# Patient Record
Sex: FEMALE | Race: WHITE | NOT HISPANIC OR LATINO | Employment: OTHER | URBAN - METROPOLITAN AREA
[De-identification: names, ages, dates, MRNs, and addresses within clinical notes are randomized per-mention and may not be internally consistent; named-entity substitution may affect disease eponyms.]

---

## 2017-01-23 ENCOUNTER — TELEPHONE (OUTPATIENT)
Dept: CARDIOLOGY | Facility: CLINIC | Age: 77
End: 2017-01-23

## 2017-01-23 NOTE — TELEPHONE ENCOUNTER
Pt's Benicar has become to expensive and would like an alternative.  Pt has discovered that the generic may be cheaper and will be calling the insurance company to see how much it would cost before requesting the alternative.

## 2017-06-19 RX ORDER — OLMESARTAN MEDOXOMIL 20 MG/1
TABLET ORAL
Qty: 45 TABLET | Refills: 0 | Status: SHIPPED | OUTPATIENT
Start: 2017-06-19 | End: 2017-08-02

## 2017-06-19 RX ORDER — AMILORIDE HYDROCHLORIDE AND HYDROCHLOROTHIAZIDE 5; 50 MG/1; MG/1
TABLET ORAL
Qty: 90 TABLET | Refills: 0 | Status: SHIPPED | OUTPATIENT
Start: 2017-06-19 | End: 2017-08-10 | Stop reason: SDUPTHER

## 2017-08-02 ENCOUNTER — OFFICE VISIT (OUTPATIENT)
Dept: CARDIOLOGY | Facility: CLINIC | Age: 77
End: 2017-08-02

## 2017-08-02 VITALS
HEART RATE: 58 BPM | DIASTOLIC BLOOD PRESSURE: 82 MMHG | WEIGHT: 183 LBS | HEIGHT: 64 IN | BODY MASS INDEX: 31.24 KG/M2 | SYSTOLIC BLOOD PRESSURE: 136 MMHG

## 2017-08-02 DIAGNOSIS — I10 ESSENTIAL HYPERTENSION: ICD-10-CM

## 2017-08-02 DIAGNOSIS — R94.31 ABNORMAL EKG: ICD-10-CM

## 2017-08-02 DIAGNOSIS — I25.10 CORONARY ARTERY DISEASE INVOLVING NATIVE CORONARY ARTERY OF NATIVE HEART WITHOUT ANGINA PECTORIS: Primary | ICD-10-CM

## 2017-08-02 PROCEDURE — 93000 ELECTROCARDIOGRAM COMPLETE: CPT | Performed by: INTERNAL MEDICINE

## 2017-08-02 PROCEDURE — 99213 OFFICE O/P EST LOW 20 MIN: CPT | Performed by: INTERNAL MEDICINE

## 2017-08-02 RX ORDER — LOSARTAN POTASSIUM 50 MG/1
50 TABLET ORAL DAILY
Qty: 90 TABLET | Refills: 1 | Status: SHIPPED | OUTPATIENT
Start: 2017-08-02 | End: 2017-10-11 | Stop reason: SDUPTHER

## 2017-08-02 NOTE — PROGRESS NOTES
Date of Office Visit: 2017  Encounter Provider: Vincenzo Hudson MD  Place of Service: [facility] CARDIOLOGY  Patient Name: Diana Bianchi  :1940    Chief Complaint   Patient presents with   • Coronary Artery Disease     ANNUAL FOLLOW UP    :     HPI: Diana Bianchi is a 76 y.o. female who presents today to followup.      She suffered a myocardial infarction in  while living in [place] and was treated with TPA. She moved to [place] in  and had a cardiac catheterization in . She did not have any type of intervention at that time, and has been treated with medical therapy.  She also has a longstanding hypertension. She doesn't check her blood pressure at home.     She is doing very well.  Her blood pressure is well controlled.  She denies chest pain, dyspnea, orthopnea, edema, palpitations, or syncope.  Her insurance is not covering olmesartan any more.      Past Medical History:   Diagnosis Date   • ASCVD (arteriosclerotic cardiovascular disease)     MI , treated in [place] with tPA.  Cath in  in [place], report unavailable, but no stenting was done.   • Claustrophobia    • Concussion    • GERD (gastroesophageal reflux disease)    • Hypertension    • MICHAELLE (obstructive sleep apnea)        Past Surgical History:   Procedure Laterality Date   • FOOT SURGERY     • HYSTERECTOMY     • KNEE SURGERY     • SPINE SURGERY         Social History     Social History   • Marital status: Single     Spouse name: N/A   • Number of children: N/A   • Years of education: N/A     Occupational History   • Not on file.     Social History Main Topics   • Smoking status: Never Smoker   • Smokeless tobacco: Never Used   • Alcohol use No      Comment: caffeine use/ 3 CUPS DAILY    • Drug use: No   • Sexual activity: Not on file     Other Topics Concern   • Not on file     Social History Narrative       Family History   Problem Relation Age of Onset   • Heart disease Mother    •  "Heart disease Father    • Bone cancer Sister    • Heart disease Brother    • Stroke Paternal Grandfather    • Hypertension Other        Review of Systems   Unable to perform ROS  Cardiovascular: Positive for dyspnea on exertion.   All other systems reviewed and are negative.      Allergies   Allergen Reactions   • Peanut Oil Shortness Of Breath   • Codeine    • Morphine And Related    • Statins          Current Outpatient Prescriptions:   •  aMILoride-hydrochlorothiazide (MODURETIC) 5-50 MG per tablet, Take 1 tablet by mouth  daily, Disp: 90 tablet, Rfl: 0  •  aspirin 81 MG tablet, Take  by mouth daily., Disp: , Rfl:   •  Cholecalciferol (VITAMIN D PO), Take  by mouth daily., Disp: , Rfl:   •  Coenzyme Q10 (CO Q 10 PO), Take  by mouth daily., Disp: , Rfl:   •  fluticasone (FLONASE) 50 MCG/ACT nasal spray, 2 sprays into each nostril daily. Administer 2 sprays in each nostril for each dose., Disp: , Rfl:   •  folic acid (FOLVITE) 400 MCG tablet, Take  by mouth daily., Disp: , Rfl:   •  lansoprazole (PREVACID) 30 MG capsule, Take  by mouth daily., Disp: , Rfl:   •  Omega 3 1000 MG capsule, Take  by mouth daily., Disp: , Rfl:   •  losartan (COZAAR) 50 MG tablet, Take 1 tablet by mouth Daily., Disp: 90 tablet, Rfl: 1     Objective:     Vitals:    08/02/17 1038   BP: 136/82   Pulse: 58   Weight: 183 lb (83 kg)   Height: 64\" (162.6 cm)     Body mass index is 31.41 kg/(m^2).    Physical Exam   Constitutional: She is oriented to person, place, and time. She appears well-developed and well-nourished.   HENT:   Head: Normocephalic.   Nose: Nose normal.   Mouth/Throat: Oropharynx is clear and moist.   Eyes: Conjunctivae and EOM are normal. Pupils are equal, round, and reactive to light.   Neck: Normal range of motion. No JVD present.   Cardiovascular: Normal rate, regular rhythm, normal heart sounds and intact distal pulses.    No murmur heard.  Pulmonary/Chest: Effort normal and breath sounds normal.   Abdominal: Soft. She " exhibits no mass. There is no tenderness.   Musculoskeletal: Normal range of motion. She exhibits no edema.   Lymphadenopathy:     She has no cervical adenopathy.   Neurological: She is alert and oriented to person, place, and time. No cranial nerve deficit.   Skin: Skin is warm and dry. No rash noted.   Psychiatric: She has a normal mood and affect. Her behavior is normal. Judgment and thought content normal.   Vitals reviewed.        ECG 12 Lead  Date/Time: 8/2/2017 3:28 PM  Performed by: VINCENZO HUDSON  Authorized by: VINCENZO HUDSON   Comparison: compared with previous ECG   Similar to previous ECG  Rhythm: sinus rhythm  Conduction: conduction normal  ST Flattening: V2 and V3  T depression: I and aVL  QRS axis: normal  Other findings: LAE  Clinical impression: abnormal ECG              Assessment:       Diagnosis Plan   1. Coronary artery disease involving native coronary artery of native heart without angina pectoris  Stress Test With Myocardial Perfusion One Day   2. Abnormal EKG  Stress Test With Myocardial Perfusion One Day   3. Essential hypertension            Plan:       1/2.  She has known CAD and no angina, but her EKG has lateral TWI that are new.  I am checking a nuclear stress.  She is on aspirin but is intolerant of statins.    3.  Her BP is within goal.  I have changed her from olmesartan to losartan due to cost.  She states she has mild hyponatremia and wonders if the diuretic needs to be stopped.  As long as her serum sodium is > 130, I feel it's reasonable to stay on it.  She developed terrible hypertension in the past when it was stopped.    Sincerely,       Vincenzo Hudson MD

## 2017-08-09 ENCOUNTER — HOSPITAL ENCOUNTER (OUTPATIENT)
Dept: CARDIOLOGY | Facility: HOSPITAL | Age: 77
Discharge: HOME | End: 2017-08-09
Attending: INTERNAL MEDICINE | Admitting: INTERNAL MEDICINE

## 2017-08-09 DIAGNOSIS — R94.31 ABNORMAL EKG: ICD-10-CM

## 2017-08-09 DIAGNOSIS — I25.10 CORONARY ARTERY DISEASE INVOLVING NATIVE CORONARY ARTERY OF NATIVE HEART WITHOUT ANGINA PECTORIS: ICD-10-CM

## 2017-08-09 LAB
BH CV NUCLEAR PRIOR STUDY: 3
BH CV STRESS BP STAGE 1: NORMAL
BH CV STRESS BP STAGE 2: NORMAL
BH CV STRESS DURATION MIN STAGE 1: 3
BH CV STRESS DURATION MIN STAGE 2: 3
BH CV STRESS DURATION SEC STAGE 1: 0
BH CV STRESS DURATION SEC STAGE 2: 30
BH CV STRESS GRADE STAGE 1: 10
BH CV STRESS GRADE STAGE 2: 12
BH CV STRESS HR STAGE 1: 103
BH CV STRESS HR STAGE 2: 126
BH CV STRESS METS STAGE 1: 5
BH CV STRESS METS STAGE 2: 7.5
BH CV STRESS PROTOCOL 1: NORMAL
BH CV STRESS RECOVERY BP: NORMAL MMHG
BH CV STRESS RECOVERY HR: 67 BPM
BH CV STRESS SPEED STAGE 1: 1.7
BH CV STRESS SPEED STAGE 2: 2.5
BH CV STRESS STAGE 1: 1
BH CV STRESS STAGE 2: 2
LV EF NUC BP: 52 %
MAXIMAL PREDICTED HEART RATE: 146 BPM
PERCENT MAX PREDICTED HR: 86.3 %
STRESS BASELINE BP: NORMAL MMHG
STRESS BASELINE HR: 60 BPM
STRESS PERCENT HR: 102 %
STRESS POST ESTIMATED WORKLOAD: 7.5 METS
STRESS POST EXERCISE DUR MIN: 6 MIN
STRESS POST EXERCISE DUR SEC: 30 SEC
STRESS POST PEAK BP: NORMAL MMHG
STRESS POST PEAK HR: 126 BPM
STRESS TARGET HR: 124 BPM

## 2017-08-09 PROCEDURE — 0 TECHNETIUM TETROFOSMIN KIT: Performed by: INTERNAL MEDICINE

## 2017-08-09 PROCEDURE — 78452 HT MUSCLE IMAGE SPECT MULT: CPT | Performed by: INTERNAL MEDICINE

## 2017-08-09 PROCEDURE — 93016 CV STRESS TEST SUPVJ ONLY: CPT | Performed by: INTERNAL MEDICINE

## 2017-08-09 PROCEDURE — 93017 CV STRESS TEST TRACING ONLY: CPT

## 2017-08-09 PROCEDURE — 78452 HT MUSCLE IMAGE SPECT MULT: CPT

## 2017-08-09 PROCEDURE — 93018 CV STRESS TEST I&R ONLY: CPT | Performed by: INTERNAL MEDICINE

## 2017-08-09 PROCEDURE — A9502 TC99M TETROFOSMIN: HCPCS | Performed by: INTERNAL MEDICINE

## 2017-08-09 RX ADMIN — TETROFOSMIN 1 DOSE: 1.38 INJECTION, POWDER, LYOPHILIZED, FOR SOLUTION INTRAVENOUS at 10:00

## 2017-08-09 RX ADMIN — TETROFOSMIN 1 DOSE: 1.38 INJECTION, POWDER, LYOPHILIZED, FOR SOLUTION INTRAVENOUS at 09:00

## 2017-08-10 ENCOUNTER — TELEPHONE (OUTPATIENT)
Dept: CARDIOLOGY | Facility: CLINIC | Age: 77
End: 2017-08-10

## 2017-08-10 RX ORDER — AMILORIDE HYDROCHLORIDE AND HYDROCHLOROTHIAZIDE 5; 50 MG/1; MG/1
1 TABLET ORAL DAILY
Qty: 90 TABLET | Refills: 3 | Status: SHIPPED | OUTPATIENT
Start: 2017-08-10 | End: 2018-03-14 | Stop reason: SDUPTHER

## 2017-08-10 NOTE — TELEPHONE ENCOUNTER
Pt called and requested a refill on her Amiloride/ hctz., submitted rx.  Pt also states that she received you v/m, but still has some questions about the test.  I attempted to contact pt back, but had to leave a v/m.  I informed her that she should receive a call back on Fri regarding results/ questions.

## 2017-08-29 ENCOUNTER — TELEPHONE (OUTPATIENT)
Dept: CARDIOLOGY | Facility: CLINIC | Age: 77
End: 2017-08-29

## 2017-08-29 NOTE — TELEPHONE ENCOUNTER
Pt left a v/m reporting that since starting the Losartan, she experiences dizziness and upset stomach.  Denies soa or any other symptoms.  I called pt back, but had to leave a v/m; instructed pt to call the office back to discuss.

## 2017-10-11 RX ORDER — LOSARTAN POTASSIUM 50 MG/1
50 TABLET ORAL DAILY
Qty: 90 TABLET | Refills: 2 | Status: SHIPPED | OUTPATIENT
Start: 2017-10-11 | End: 2018-07-05 | Stop reason: SDUPTHER

## 2017-10-30 ENCOUNTER — OFFICE VISIT (OUTPATIENT)
Dept: RETAIL CLINIC | Facility: CLINIC | Age: 77
End: 2017-10-30

## 2017-10-30 DIAGNOSIS — Z23 NEEDS FLU SHOT: Primary | ICD-10-CM

## 2018-03-15 RX ORDER — AMILORIDE HYDROCHLORIDE AND HYDROCHLOROTHIAZIDE 5; 50 MG/1; MG/1
1 TABLET ORAL DAILY
Qty: 90 TABLET | Refills: 1 | Status: SHIPPED | OUTPATIENT
Start: 2018-03-15 | End: 2018-07-05 | Stop reason: SDUPTHER

## 2018-04-12 ENCOUNTER — TELEPHONE (OUTPATIENT)
Dept: CARDIOLOGY | Facility: CLINIC | Age: 78
End: 2018-04-12

## 2018-04-12 NOTE — TELEPHONE ENCOUNTER
[phone]    Pt states she purchased Vitamin K supplement to take.  She is wondering if it is ok to take while on her asa.  Please advise.      [phone]    Thanks, C GABBY

## 2018-07-03 NOTE — PROGRESS NOTES
Date of Office Visit: 2018  Encounter Provider: Vincenzo Hudson MD  Place of Service: [facility] CARDIOLOGY  Patient Name: Diana Bianchi  :1940    Chief Complaint   Patient presents with   • Coronary Artery Disease   :     HPI: Diana Bianchi is a 77 y.o. female who presents today to followup.      She suffered a myocardial infarction in  while living in [place] and was treated with TPA. She moved to [place] in  and had a cardiac catheterization in . She did not have any type of intervention at that time, and has been treated with medical therapy.  She also has a longstanding hypertension.     In 2017, I noted some lateral T wave inversions; I ordered a Myoview stress which was normal.     She is doing very well.  She denies chest pain, dyspnea, orthopnea, edema, palpitations, or syncope.      Past Medical History:   Diagnosis Date   • ASCVD (arteriosclerotic cardiovascular disease)     MI , treated in [place] with tPA.  Cath in  in [place], report unavailable, but no stenting was done.   • Claustrophobia    • Concussion    • GERD (gastroesophageal reflux disease)    • Hypertension    • MICHAELLE (obstructive sleep apnea)        Past Surgical History:   Procedure Laterality Date   • FOOT SURGERY     • HYSTERECTOMY     • KNEE SURGERY     • SPINE SURGERY         Social History     Social History   • Marital status: Single     Spouse name: N/A   • Number of children: N/A   • Years of education: N/A     Occupational History   • Not on file.     Social History Main Topics   • Smoking status: Never Smoker   • Smokeless tobacco: Never Used      Comment: caffeine use/ 3 CUPS DAILY    • Alcohol use No   • Drug use: No   • Sexual activity: Not on file     Other Topics Concern   • Not on file     Social History Narrative   • No narrative on file       Family History   Problem Relation Age of Onset   • Heart disease Mother    • Heart disease Father    • Bone cancer  "Sister    • Heart disease Brother    • Stroke Paternal Grandfather    • Hypertension Other        Review of Systems   Unable to perform ROS  Constitution: Positive for malaise/fatigue.   Cardiovascular: Negative for chest pain and palpitations.   Respiratory: Negative for shortness of breath.    Musculoskeletal: Positive for myalgias.   Neurological: Negative for light-headedness.   All other systems reviewed and are negative.      Allergies   Allergen Reactions   • Peanut Oil Shortness Of Breath   • Codeine    • Morphine And Related    • Statins          Current Outpatient Prescriptions:   •  aMILoride-hydrochlorothiazide (MODURETIC) 5-50 MG per tablet, Take 1 tablet by mouth Daily., Disp: 90 tablet, Rfl: 3  •  aspirin 81 MG tablet, Take  by mouth daily., Disp: , Rfl:   •  Cholecalciferol (VITAMIN D PO), Take  by mouth daily., Disp: , Rfl:   •  Coenzyme Q10 (CO Q 10 PO), Take  by mouth daily., Disp: , Rfl:   •  fluticasone (FLONASE) 50 MCG/ACT nasal spray, 2 sprays into each nostril daily. Administer 2 sprays in each nostril for each dose., Disp: , Rfl:   •  folic acid (FOLVITE) 400 MCG tablet, Take  by mouth daily., Disp: , Rfl:   •  HAVRIX 1440 EL U/ML vaccine, ADM 1ML IM UTD, Disp: , Rfl: 0  •  losartan (COZAAR) 50 MG tablet, Take 1 tablet by mouth Daily., Disp: 90 tablet, Rfl: 3  •  Omega 3 1000 MG capsule, Take  by mouth daily., Disp: , Rfl:   •  raNITIdine (ZANTAC) 300 MG tablet, Take 300 mg by mouth Every Night., Disp: , Rfl:      Objective:     Vitals:    07/05/18 1339 07/05/18 1406   BP: 138/82 132/80   BP Location: Right arm    Pulse: 53    Weight: 83.2 kg (183 lb 6.4 oz)    Height: 162.6 cm (64\")      Body mass index is 31.48 kg/m².    Physical Exam   Constitutional: She is oriented to person, place, and time. She appears well-developed and well-nourished.   HENT:   Head: Normocephalic.   Nose: Nose normal.   Mouth/Throat: Oropharynx is clear and moist.   Eyes: Conjunctivae and EOM are normal. Pupils are " equal, round, and reactive to light.   Neck: Normal range of motion. No JVD present.   Cardiovascular: Normal rate, regular rhythm, normal heart sounds and intact distal pulses.    No murmur heard.  Pulmonary/Chest: Effort normal and breath sounds normal.   Abdominal: Soft. She exhibits no mass. There is no tenderness.   Musculoskeletal: Normal range of motion. She exhibits no edema.   Lymphadenopathy:     She has no cervical adenopathy.   Neurological: She is alert and oriented to person, place, and time. No cranial nerve deficit.   Skin: Skin is warm and dry. No rash noted.   Psychiatric: She has a normal mood and affect. Her behavior is normal. Judgment and thought content normal.   Vitals reviewed.        ECG 12 Lead  Date/Time: 7/5/2018 2:01 PM  Performed by: VINCENZO HUDSON  Authorized by: VINCENZO HUDSON   Comparison: compared with previous ECG   Comparison to previous ECG: C/w prior, TW abnormality has improved  Rhythm: sinus rhythm  Conduction: conduction normal  ST Segments: ST segments normal  T Waves: T waves normal  QRS axis: normal  Other findings: PRWP  Clinical impression: non-specific ECG              Assessment:       Diagnosis Plan   1. Coronary artery disease involving native coronary artery of native heart without angina pectoris     2. Essential hypertension            Plan:     1.  Coronary Artery Disease  Assessment  • The patient has no angina  • She is intolerant of statins.  A Myoview was normal in 2017.    Subjective - Objective  • There is a history of past MI 1991  • Current antiplatelet therapy includes aspirin 81 mg      2.  Her BP is generally within goal.      Sincerely,       Vincenzo Hudson MD

## 2018-07-05 ENCOUNTER — OFFICE VISIT (OUTPATIENT)
Dept: CARDIOLOGY | Facility: CLINIC | Age: 78
End: 2018-07-05

## 2018-07-05 ENCOUNTER — TRANSCRIBE ORDERS (OUTPATIENT)
Dept: CARDIOLOGY | Facility: CLINIC | Age: 78
End: 2018-07-05

## 2018-07-05 VITALS
HEIGHT: 64 IN | SYSTOLIC BLOOD PRESSURE: 132 MMHG | WEIGHT: 183.4 LBS | HEART RATE: 53 BPM | DIASTOLIC BLOOD PRESSURE: 80 MMHG | BODY MASS INDEX: 31.31 KG/M2

## 2018-07-05 DIAGNOSIS — Z00.00 ROUTINE CHECK-UP: Primary | ICD-10-CM

## 2018-07-05 DIAGNOSIS — I25.10 CORONARY ARTERY DISEASE INVOLVING NATIVE CORONARY ARTERY OF NATIVE HEART WITHOUT ANGINA PECTORIS: Primary | ICD-10-CM

## 2018-07-05 DIAGNOSIS — I10 ESSENTIAL HYPERTENSION: ICD-10-CM

## 2018-07-05 PROCEDURE — 93000 ELECTROCARDIOGRAM COMPLETE: CPT | Performed by: INTERNAL MEDICINE

## 2018-07-05 PROCEDURE — 99213 OFFICE O/P EST LOW 20 MIN: CPT | Performed by: INTERNAL MEDICINE

## 2018-07-05 RX ORDER — AMILORIDE HYDROCHLORIDE AND HYDROCHLOROTHIAZIDE 5; 50 MG/1; MG/1
1 TABLET ORAL DAILY
Qty: 90 TABLET | Refills: 3 | Status: SHIPPED | OUTPATIENT
Start: 2018-07-05 | End: 2019-05-23 | Stop reason: SDUPTHER

## 2018-07-05 RX ORDER — HEPATITIS A VACCINE 1440 [IU]/ML
INJECTION, SUSPENSION INTRAMUSCULAR
Refills: 0 | COMMUNITY
Start: 2018-05-15 | End: 2019-08-20

## 2018-07-05 RX ORDER — LOSARTAN POTASSIUM 50 MG/1
50 TABLET ORAL DAILY
Qty: 90 TABLET | Refills: 3 | Status: SHIPPED | OUTPATIENT
Start: 2018-07-05 | End: 2019-07-31 | Stop reason: SDUPTHER

## 2018-07-23 RX ORDER — LOSARTAN POTASSIUM 50 MG/1
50 TABLET ORAL DAILY
Qty: 90 TABLET | Refills: 3 | Status: SHIPPED | OUTPATIENT
Start: 2018-07-23 | End: 2019-07-31 | Stop reason: SDUPTHER

## 2018-07-25 ENCOUNTER — TELEPHONE (OUTPATIENT)
Dept: CARDIOLOGY | Facility: CLINIC | Age: 78
End: 2018-07-25

## 2018-08-06 ENCOUNTER — TELEPHONE (OUTPATIENT)
Dept: CARDIOLOGY | Facility: CLINIC | Age: 78
End: 2018-08-06

## 2018-08-06 NOTE — TELEPHONE ENCOUNTER
I have low suspicion.  I would ask her to restart the medication and call with any more episodes of sweating.  Thank you

## 2018-08-06 NOTE — TELEPHONE ENCOUNTER
Pt called stating that she missed two days worth of her Losartan due to just rushing around and forgot.   She did resume taking today.   She noticed today while at the grocery that she broke out in a profuse sweat that started running down her neck. No other symptoms.   She would like to know if this could be due to missing 2 doses of her Losartan.

## 2018-08-25 ENCOUNTER — OFFICE VISIT (OUTPATIENT)
Dept: RETAIL CLINIC | Facility: CLINIC | Age: 78
End: 2018-08-25

## 2018-08-25 VITALS
HEIGHT: 64 IN | DIASTOLIC BLOOD PRESSURE: 86 MMHG | OXYGEN SATURATION: 97 % | SYSTOLIC BLOOD PRESSURE: 136 MMHG | BODY MASS INDEX: 31.58 KG/M2 | WEIGHT: 185 LBS | RESPIRATION RATE: 18 BRPM | HEART RATE: 58 BPM | TEMPERATURE: 97.4 F

## 2018-08-25 DIAGNOSIS — J01.10 ACUTE NON-RECURRENT FRONTAL SINUSITIS: Primary | ICD-10-CM

## 2018-08-25 PROCEDURE — 99213 OFFICE O/P EST LOW 20 MIN: CPT | Performed by: NURSE PRACTITIONER

## 2018-08-25 RX ORDER — AMOXICILLIN 875 MG/1
875 TABLET, COATED ORAL 2 TIMES DAILY
Qty: 20 TABLET | Refills: 0 | Status: SHIPPED | OUTPATIENT
Start: 2018-08-25 | End: 2018-09-04

## 2018-08-25 NOTE — PROGRESS NOTES
"Subjective   Diana Bianchi is a 77 y.o. female.     /86 (BP Location: Left arm, Patient Position: Sitting, Cuff Size: Adult)   Pulse 58   Temp 97.4 °F (36.3 °C) (Oral)   Resp 18   Ht 162.6 cm (64\")   Wt 83.9 kg (185 lb)   SpO2 97%   BMI 31.76 kg/m²     Sore Throat    This is a new problem. The current episode started in the past 7 days. The problem has been gradually worsening. The pain is worse on the right side. There has been no fever. The pain is at a severity of 4/10. The pain is mild. Associated symptoms include ear pain and headaches. Pertinent negatives include no abdominal pain, congestion, coughing, ear discharge, neck pain, shortness of breath, swollen glands or vomiting. She has had no exposure to strep or mono. Treatments tried: allergy meds  The treatment provided no relief.        The following portions of the patient's history were reviewed and updated as appropriate: current medications, past family history, past medical history, past social history, past surgical history and problem list.    Review of Systems   Constitutional: Positive for fatigue. Negative for chills and fever.   HENT: Positive for ear pain and sore throat. Negative for congestion and ear discharge.    Eyes: Negative.    Respiratory: Negative.  Negative for cough and shortness of breath.    Cardiovascular: Negative.    Gastrointestinal: Negative.  Negative for abdominal pain and vomiting.   Endocrine: Negative.    Genitourinary: Negative.    Musculoskeletal: Negative.  Negative for neck pain.   Skin: Negative.    Allergic/Immunologic: Positive for environmental allergies.   Neurological: Positive for headache.   Hematological: Negative.    Psychiatric/Behavioral: Negative.        Objective   Physical Exam   Constitutional: She is oriented to person, place, and time. She appears well-developed and well-nourished.   HENT:   Head: Normocephalic and atraumatic.   Right Ear: Hearing, tympanic membrane, external ear and ear " canal normal.   Left Ear: Hearing, tympanic membrane, external ear and ear canal normal.   Nose: Sinus tenderness and congestion present. Right sinus exhibits frontal sinus tenderness. Left sinus exhibits frontal sinus tenderness.   Mouth/Throat: Uvula is midline, oropharynx is clear and moist and mucous membranes are normal.   Eyes: Pupils are equal, round, and reactive to light. Conjunctivae and EOM are normal.   Wears glasses    Neck: Normal range of motion.   Cardiovascular: Normal rate, regular rhythm and normal heart sounds.    Pulmonary/Chest: Effort normal and breath sounds normal.   Abdominal: Soft. Bowel sounds are normal.   Musculoskeletal: Normal range of motion.   Neurological: She is alert and oriented to person, place, and time.   Skin: Skin is warm and dry.   Psychiatric: She has a normal mood and affect.         Assessment/Plan   Diana was seen today for sore throat.    Diagnoses and all orders for this visit:    Acute non-recurrent frontal sinusitis  -     amoxicillin (AMOXIL) 875 MG tablet; Take 1 tablet by mouth 2 (Two) Times a Day for 10 days.        Sinus rinse

## 2018-08-25 NOTE — PATIENT INSTRUCTIONS
Sinusitis, Adult  Sinusitis is soreness and inflammation of your sinuses. Sinuses are hollow spaces in the bones around your face. They are located:  · Around your eyes.  · In the middle of your forehead.  · Behind your nose.  · In your cheekbones.    Your sinuses and nasal passages are lined with a stringy fluid (mucus). Mucus normally drains out of your sinuses. When your nasal tissues get inflamed or swollen, the mucus can get trapped or blocked so air cannot flow through your sinuses. This lets bacteria, viruses, and funguses grow, and that leads to infection.  Follow these instructions at home:  Medicines  · Take, use, or apply over-the-counter and prescription medicines only as told by your doctor. These may include nasal sprays.  · If you were prescribed an antibiotic medicine, take it as told by your doctor. Do not stop taking the antibiotic even if you start to feel better.  Hydrate and Humidify  · Drink enough water to keep your pee (urine) clear or pale yellow.  · Use a cool mist humidifier to keep the humidity level in your home above 50%.  · Breathe in steam for 10-15 minutes, 3-4 times a day or as told by your doctor. You can do this in the bathroom while a hot shower is running.  · Try not to spend time in cool or dry air.  Rest  · Rest as much as possible.  · Sleep with your head raised (elevated).  · Make sure to get enough sleep each night.  General instructions  · Put a warm, moist washcloth on your face 3-4 times a day or as told by your doctor. This will help with discomfort.  · Wash your hands often with soap and water. If there is no soap and water, use hand .  · Do not smoke. Avoid being around people who are smoking (secondhand smoke).  · Keep all follow-up visits as told by your doctor. This is important.  Contact a doctor if:  · You have a fever.  · Your symptoms get worse.  · Your symptoms do not get better within 10 days.  Get help right away if:  · You have a very bad  headache.  · You cannot stop throwing up (vomiting).  · You have pain or swelling around your face or eyes.  · You have trouble seeing.  · You feel confused.  · Your neck is stiff.  · You have trouble breathing.  This information is not intended to replace advice given to you by your health care provider. Make sure you discuss any questions you have with your health care provider.  Document Released: 06/05/2009 Document Revised: 08/13/2017 Document Reviewed: 10/12/2016  Elsevier Interactive Patient Education © 2018 [employer]

## 2018-10-14 ENCOUNTER — HOSPITAL ENCOUNTER (OUTPATIENT)
Dept: CARDIOLOGY | Facility: HOSPITAL | Age: 78
Discharge: HOME | End: 2018-10-14
Attending: INTERNAL MEDICINE

## 2018-10-14 DIAGNOSIS — Z00.00 ROUTINE CHECK-UP: ICD-10-CM

## 2018-10-17 LAB
BH CV XLRA MEAS - PAD LEFT ABI PT: 0.94
BH CV XLRA MEAS - PAD LEFT ARM: 145 MMHG
BH CV XLRA MEAS - PAD LEFT LEG PT: 145 MMHG
BH CV XLRA MEAS - PAD RIGHT ABI PT: 1
BH CV XLRA MEAS - PAD RIGHT ARM: 154 MMHG
BH CV XLRA MEAS - PAD RIGHT LEG PT: 157 MMHG
BH CV XLRA MEAS - PROX AO DIAM: 1.7 CM
BH CV XLRA MEAS LEFT ICA/CCA RATIO: 1.6
BH CV XLRA MEAS LEFT MID CCA PSV: NORMAL CM/SEC
BH CV XLRA MEAS LEFT MID ICA PSV: NORMAL CM/SEC
BH CV XLRA MEAS LEFT PROX ECA PSV: 63 CM/SEC
BH CV XLRA MEAS RIGHT ICA/CCA RATIO: 1.7
BH CV XLRA MEAS RIGHT MID CCA PSV: NORMAL CM/SEC
BH CV XLRA MEAS RIGHT MID ICA PSV: NORMAL CM/SEC
BH CV XLRA MEAS RIGHT PROX ECA PSV: 50 CM/SEC

## 2018-10-18 DIAGNOSIS — I73.9 CLAUDICATION (HCC): Primary | ICD-10-CM

## 2018-10-29 ENCOUNTER — HOSPITAL ENCOUNTER (OUTPATIENT)
Dept: CARDIOLOGY | Facility: HOSPITAL | Age: 78
Discharge: HOME | End: 2018-10-29
Attending: INTERNAL MEDICINE | Admitting: INTERNAL MEDICINE

## 2018-10-29 ENCOUNTER — TELEPHONE (OUTPATIENT)
Dept: CARDIOLOGY | Facility: CLINIC | Age: 78
End: 2018-10-29

## 2018-10-29 DIAGNOSIS — I73.9 CLAUDICATION (HCC): ICD-10-CM

## 2018-10-29 LAB
BH CV LOWER ARTERIAL LEFT ABI RATIO: 1.13
BH CV LOWER ARTERIAL LEFT DORSALIS PEDIS SYS MAX: 195 MMHG
BH CV LOWER ARTERIAL LEFT GREAT TOE SYS MAX: 107 MMHG
BH CV LOWER ARTERIAL LEFT POST TIBIAL SYS MAX: 179 MMHG
BH CV LOWER ARTERIAL LEFT TBI RATIO: 0.62
BH CV LOWER ARTERIAL RIGHT DORSALIS PEDIS SYS MAX: 188 MMHG
BH CV LOWER ARTERIAL RIGHT GREAT TOE SYS MAX: 133 MMHG
BH CV LOWER ARTERIAL RIGHT TBI RATIO: 0.77
UPPER ARTERIAL LEFT ARM BRACHIAL SYS MAX: 171 MMHG
UPPER ARTERIAL RIGHT ARM BRACHIAL SYS MAX: 173 MMHG

## 2018-10-29 PROCEDURE — 93922 UPR/L XTREMITY ART 2 LEVELS: CPT

## 2018-10-29 NOTE — TELEPHONE ENCOUNTER
Pt of Dr. Hudson:  Please review FLORENCE results.  I can contact pt if needed.     Doppler Ankle Brachial Index Single Level CAR   Order: [id number]   Status:  Final result   Visible to patient:  No (Not Released) Dx:  Claudication (CMS/[facility])   Details     Reading Physician Reading Date Result Priority   Dayana Reardon Jr., MD 10/29/2018 Routine   Result Text   ·   Right Conclusion: The right FLORENCE is normal. Normal digital pressures.  · Left Conclusion: The left FLORENCE is normal. Normal digital pressures.            All Measurements                                                                   [facility] NON-INV VASC  [address] [facility] [address] [phone]      Study Findings     Right Sided Findings:  • Right Posterior Tibial: The pulse is detected w/ Doppler. The right posterior tibial artery is calcified and not compressible.  • Right Dorsalis Pedis: The pulse is 2+.   Right lower assessment findings include thickened toenails.     Left Sided Findings:  • Left Posterior Tibial: The pulse is detected w/ Doppler.   • Left Dorsalis Pedis: The pulse is 2+.   Left lower assessment findings include thickened toenails.   Study Impression     Right Conclusion: The right FLORENCE is normal. Normal digital pressures.     Left Conclusion: The left FLORENCE is normal. Normal digital pressures.

## 2019-02-18 ENCOUNTER — TELEPHONE (OUTPATIENT)
Dept: CARDIOLOGY | Facility: CLINIC | Age: 79
End: 2019-02-18

## 2019-05-24 RX ORDER — AMILORIDE HYDROCHLORIDE AND HYDROCHLOROTHIAZIDE 5; 50 MG/1; MG/1
1 TABLET ORAL DAILY
Qty: 90 TABLET | Refills: 0 | Status: SHIPPED | OUTPATIENT
Start: 2019-05-24 | End: 2019-07-31 | Stop reason: SDUPTHER

## 2019-05-24 RX ORDER — LOSARTAN POTASSIUM 50 MG/1
TABLET ORAL
Qty: 90 TABLET | Refills: 0 | Status: SHIPPED | OUTPATIENT
Start: 2019-05-24 | End: 2019-07-31 | Stop reason: SDUPTHER

## 2019-07-31 ENCOUNTER — OFFICE VISIT (OUTPATIENT)
Dept: CARDIOLOGY | Facility: CLINIC | Age: 79
End: 2019-07-31

## 2019-07-31 VITALS
DIASTOLIC BLOOD PRESSURE: 80 MMHG | WEIGHT: 178 LBS | SYSTOLIC BLOOD PRESSURE: 146 MMHG | BODY MASS INDEX: 30.39 KG/M2 | HEART RATE: 49 BPM | HEIGHT: 64 IN

## 2019-07-31 DIAGNOSIS — I25.10 CORONARY ARTERY DISEASE INVOLVING NATIVE CORONARY ARTERY OF NATIVE HEART WITHOUT ANGINA PECTORIS: Primary | ICD-10-CM

## 2019-07-31 DIAGNOSIS — R00.1 SINUS BRADYCARDIA: ICD-10-CM

## 2019-07-31 DIAGNOSIS — I10 ESSENTIAL HYPERTENSION: ICD-10-CM

## 2019-07-31 PROCEDURE — 93000 ELECTROCARDIOGRAM COMPLETE: CPT | Performed by: INTERNAL MEDICINE

## 2019-07-31 PROCEDURE — 99214 OFFICE O/P EST MOD 30 MIN: CPT | Performed by: INTERNAL MEDICINE

## 2019-07-31 RX ORDER — AMILORIDE HYDROCHLORIDE AND HYDROCHLOROTHIAZIDE 5; 50 MG/1; MG/1
1 TABLET ORAL DAILY
Qty: 90 TABLET | Refills: 3 | Status: SHIPPED | OUTPATIENT
Start: 2019-07-31 | End: 2020-07-21

## 2019-07-31 RX ORDER — LOSARTAN POTASSIUM 50 MG/1
50 TABLET ORAL DAILY
Qty: 90 TABLET | Refills: 3 | Status: SHIPPED | OUTPATIENT
Start: 2019-07-31 | End: 2019-08-31 | Stop reason: SDUPTHER

## 2019-07-31 NOTE — PROGRESS NOTES
Date of Office Visit: 2019  Encounter Provider: Vincenzo Hudson MD  Place of Service: [facility]  Patient Name: Diana Bianchi  :1940    Chief Complaint   Patient presents with   • Coronary Artery Disease   :     HPI: Diana Bianchi is a 78 y.o. female who presents today to followup.      She suffered a myocardial infarction in  while living in [place] and was treated with TPA. She moved to [place] in  and had a cardiac catheterization in . She did not have any type of intervention at that time, and has been treated with medical therapy.  She also has a longstanding hypertension.     In 2017, I noted some lateral T wave inversions; I ordered a Myoview stress which was normal.     She has generally done very well She denies chest pain, dyspnea, orthopnea, edema, palpitations, or syncope.  She has been very tired the last three days though.    Past Medical History:   Diagnosis Date   • ASCVD (arteriosclerotic cardiovascular disease)     MI , treated in [place] with tPA.  Cath in  in [place], report unavailable, but no stenting was done.   • Claustrophobia    • Concussion    • GERD (gastroesophageal reflux disease)    • Hypertension    • MICHAELLE (obstructive sleep apnea)        Past Surgical History:   Procedure Laterality Date   • FOOT SURGERY     • HYSTERECTOMY     • KNEE SURGERY     • SPINE SURGERY         Social History     Socioeconomic History   • Marital status: Single     Spouse name: Not on file   • Number of children: Not on file   • Years of education: Not on file   • Highest education level: Not on file   Tobacco Use   • Smoking status: Never Smoker   • Smokeless tobacco: Never Used   • Tobacco comment: caffeine use/ 3 CUPS DAILY    Substance and Sexual Activity   • Alcohol use: No   • Drug use: No       Family History   Problem Relation Age of Onset   • Heart disease Mother    • Heart disease Father    • Bone cancer Sister    • Heart  "disease Brother    • Stroke Paternal Grandfather    • Hypertension Other        Review of Systems   Unable to perform ROS  Constitution: Positive for malaise/fatigue.   Cardiovascular: Negative for chest pain and palpitations.   Respiratory: Negative for shortness of breath.    Musculoskeletal: Positive for myalgias.   Neurological: Negative for light-headedness.   All other systems reviewed and are negative.      Allergies   Allergen Reactions   • Hydrocodone-Acetaminophen Shortness Of Breath   • Oxycodone-Acetaminophen Shortness Of Breath   • Peanut Oil Shortness Of Breath   • Codeine    • Morphine And Related    • Statins          Current Outpatient Medications:   •  aMILoride-hydrochlorothiazide (MODURETIC) 5-50 MG per tablet, TAKE 1 TABLET BY MOUTH  DAILY, Disp: 90 tablet, Rfl: 0  •  aspirin 81 MG tablet, Take  by mouth daily., Disp: , Rfl:   •  CEFDINIR PO, Take  by mouth 2 (Two) Times a Day., Disp: , Rfl:   •  Cholecalciferol (VITAMIN D PO), Take  by mouth daily., Disp: , Rfl:   •  Coenzyme Q10 (CO Q 10 PO), Take  by mouth daily., Disp: , Rfl:   •  fluticasone (FLONASE) 50 MCG/ACT nasal spray, 2 sprays into each nostril daily. Administer 2 sprays in each nostril for each dose., Disp: , Rfl:   •  folic acid (FOLVITE) 400 MCG tablet, Take  by mouth daily., Disp: , Rfl:   •  HAVRIX 1440 EL U/ML vaccine, ADM 1ML IM UTD, Disp: , Rfl: 0  •  losartan (COZAAR) 50 MG tablet, Take 1 tablet by mouth Daily., Disp: 90 tablet, Rfl: 3  •  Omega 3 1000 MG capsule, Take  by mouth daily., Disp: , Rfl:   •  raNITIdine (ZANTAC) 300 MG tablet, Take 300 mg by mouth Every Night., Disp: , Rfl:      Objective:     Vitals:    07/31/19 1211   BP: 146/80   BP Location: Left arm   Pulse: (!) 49   Weight: 80.7 kg (178 lb)   Height: 162.6 cm (64\")     Body mass index is 30.55 kg/m².    Physical Exam   Constitutional: She is oriented to person, place, and time. She appears well-developed and well-nourished.   HENT:   Head: Normocephalic. "   Nose: Nose normal.   Mouth/Throat: Oropharynx is clear and moist.   Eyes: Conjunctivae and EOM are normal. Pupils are equal, round, and reactive to light.   Neck: Normal range of motion. No JVD present.   Cardiovascular: Regular rhythm, normal heart sounds and intact distal pulses. Bradycardia present.   No murmur heard.  Pulmonary/Chest: Effort normal and breath sounds normal.   Abdominal: Soft. There is no tenderness.   Musculoskeletal: Normal range of motion. She exhibits edema (doughy legs but no swelling).   Neurological: She is alert and oriented to person, place, and time. No cranial nerve deficit.   Skin: Skin is warm and dry. No rash noted.   Psychiatric: She has a normal mood and affect. Her behavior is normal. Judgment and thought content normal.   Vitals reviewed.        ECG 12 Lead  Date/Time: 7/31/2019 12:35 PM  Performed by: Vincenzo Hudson MD  Authorized by: Vincenzo Hudson MD   Comparison: compared with previous ECG   Similar to previous ECG  Rhythm: sinus bradycardia  Conduction: conduction normal  ST Segments: ST segments normal  T Waves: T waves normal  QRS axis: normal  Other findings: left atrial abnormality    Clinical impression: abnormal EKG              Assessment:       Diagnosis Plan   1. Coronary artery disease involving native coronary artery of native heart without angina pectoris     2. Essential hypertension     3. Sinus bradycardia            Plan:     1.  Coronary Artery Disease  Assessment  • The patient has no angina  • She is intolerant of statins.  A Myoview was normal in 2017.    Subjective - Objective  • There is a history of past MI 1991  • Current antiplatelet therapy includes aspirin 81 mg      2.  Her BP is generally within goal.    3.  She typically runs in the low 50s.  Today she is 49.  She has been more tired.  She is not on anything that will lower her rate.  I will get a Holter and a low level treadmill stress to assess her chronotropic response.    Sincerely,        Vincenzo Hudson MD

## 2019-08-08 ENCOUNTER — HOSPITAL ENCOUNTER (OUTPATIENT)
Dept: CARDIOLOGY | Facility: HOSPITAL | Age: 79
Discharge: HOME | End: 2019-08-08
Admitting: INTERNAL MEDICINE

## 2019-08-08 DIAGNOSIS — R00.1 SINUS BRADYCARDIA: ICD-10-CM

## 2019-08-08 DIAGNOSIS — I25.10 CORONARY ARTERY DISEASE INVOLVING NATIVE CORONARY ARTERY OF NATIVE HEART WITHOUT ANGINA PECTORIS: ICD-10-CM

## 2019-08-08 LAB
BH CV STRESS BP STAGE 1: NORMAL
BH CV STRESS BP STAGE 2: NORMAL
BH CV STRESS BP STAGE 3: NORMAL
BH CV STRESS DURATION MIN STAGE 1: 3
BH CV STRESS DURATION MIN STAGE 2: 3
BH CV STRESS DURATION MIN STAGE 3: 3
BH CV STRESS DURATION SEC STAGE 1: 0
BH CV STRESS DURATION SEC STAGE 2: 0
BH CV STRESS DURATION SEC STAGE 3: 0
BH CV STRESS GRADE STAGE 1: 0
BH CV STRESS GRADE STAGE 2: 5
BH CV STRESS GRADE STAGE 3: 10
BH CV STRESS HR STAGE 1: 89
BH CV STRESS HR STAGE 2: 94
BH CV STRESS HR STAGE 3: 103
BH CV STRESS METS STAGE 1: 2.3
BH CV STRESS METS STAGE 2: 3.5
BH CV STRESS METS STAGE 3: 4.6
BH CV STRESS PROTOCOL 1: NORMAL
BH CV STRESS RECOVERY BP: NORMAL MMHG
BH CV STRESS RECOVERY HR: 60 BPM
BH CV STRESS SPEED STAGE 1: 1.7
BH CV STRESS SPEED STAGE 2: 1.7
BH CV STRESS SPEED STAGE 3: 1.7
BH CV STRESS STAGE 1: 1
BH CV STRESS STAGE 2: 2
BH CV STRESS STAGE 3: 3
MAXIMAL PREDICTED HEART RATE: 144 BPM
PERCENT MAX PREDICTED HR: 71.53 %
STRESS BASELINE BP: NORMAL MMHG
STRESS BASELINE HR: 63 BPM
STRESS PERCENT HR: 84 %
STRESS POST EXERCISE DUR MIN: 9 MIN
STRESS POST PEAK BP: NORMAL MMHG
STRESS POST PEAK HR: 103 BPM
STRESS TARGET HR: 122 BPM

## 2019-08-08 PROCEDURE — 93016 CV STRESS TEST SUPVJ ONLY: CPT | Performed by: INTERNAL MEDICINE

## 2019-08-08 PROCEDURE — 93017 CV STRESS TEST TRACING ONLY: CPT

## 2019-08-08 PROCEDURE — 93018 CV STRESS TEST I&R ONLY: CPT | Performed by: INTERNAL MEDICINE

## 2019-08-20 ENCOUNTER — OFFICE VISIT (OUTPATIENT)
Dept: FAMILY MEDICINE CLINIC | Facility: CLINIC | Age: 79
End: 2019-08-20

## 2019-08-20 VITALS
WEIGHT: 179.8 LBS | TEMPERATURE: 97.7 F | SYSTOLIC BLOOD PRESSURE: 138 MMHG | DIASTOLIC BLOOD PRESSURE: 76 MMHG | BODY MASS INDEX: 30.7 KG/M2 | HEIGHT: 64 IN | OXYGEN SATURATION: 98 % | HEART RATE: 60 BPM

## 2019-08-20 DIAGNOSIS — Z91.89 AT RISK FOR BONE DENSITY LOSS: ICD-10-CM

## 2019-08-20 DIAGNOSIS — Z12.31 VISIT FOR SCREENING MAMMOGRAM: ICD-10-CM

## 2019-08-20 DIAGNOSIS — I25.10 CORONARY ARTERY DISEASE INVOLVING NATIVE CORONARY ARTERY OF NATIVE HEART WITHOUT ANGINA PECTORIS: ICD-10-CM

## 2019-08-20 DIAGNOSIS — I10 ESSENTIAL HYPERTENSION: ICD-10-CM

## 2019-08-20 DIAGNOSIS — Z00.00 MEDICARE ANNUAL WELLNESS VISIT, SUBSEQUENT: Primary | ICD-10-CM

## 2019-08-20 DIAGNOSIS — J30.2 SEASONAL ALLERGIES: ICD-10-CM

## 2019-08-20 DIAGNOSIS — Z78.0 POSTMENOPAUSAL: ICD-10-CM

## 2019-08-20 DIAGNOSIS — K21.9 GASTROESOPHAGEAL REFLUX DISEASE WITHOUT ESOPHAGITIS: ICD-10-CM

## 2019-08-20 PROCEDURE — G0439 PPPS, SUBSEQ VISIT: HCPCS | Performed by: INTERNAL MEDICINE

## 2019-08-20 RX ORDER — CEFDINIR 300 MG/1
300 CAPSULE ORAL 2 TIMES DAILY
Qty: 20 CAPSULE | Refills: 1 | Status: SHIPPED | OUTPATIENT
Start: 2019-08-20 | End: 2020-07-15 | Stop reason: SDUPTHER

## 2019-08-20 RX ORDER — RANITIDINE 300 MG/1
300 TABLET ORAL NIGHTLY
Qty: 90 TABLET | Refills: 3 | Status: SHIPPED | OUTPATIENT
Start: 2019-08-20 | End: 2019-10-21 | Stop reason: RX

## 2019-08-20 RX ORDER — FLUTICASONE PROPIONATE 50 MCG
2 SPRAY, SUSPENSION (ML) NASAL DAILY
Qty: 1 BOTTLE | Refills: 11 | Status: SHIPPED | OUTPATIENT
Start: 2019-08-20 | End: 2020-07-15 | Stop reason: SDUPTHER

## 2019-08-20 NOTE — PROGRESS NOTES
Subsequent Medicare Wellness Visit   The ABC's of the Annual Wellness Visit    Chief Complaint   Patient presents with   • Annual Exam     wellness check   • Labs Only       HPI:  Diana Bianchi YOB: 1940, is a 78 y.o. female who presents for a Subsequent Medicare Wellness Visit.    Recent Hospitalizations:  No hospitalization(s) within the last year..    Current Medical Providers:  Patient Care Team:  Manoj Nunes MD as PCP - General (Internal Medicine)   Dr Hudson - cardiology  Dr Aviles - orthopedic  Dr Vázquez- podiatry  Dr Reyes- ophthalmology  Dr Stanford - [facility] ophthalmology  Dr Mayo - neurology    Health Habits and Functional and Cognitive Screening and Depression Screening:  Functional & Cognitive Status 2019   Do you have difficulty preparing food and eating? No   Do you have difficulty bathing yourself, getting dressed or grooming yourself? No   Do you have difficulty using the toilet? No   Do you have difficulty moving around from place to place? No   Do you have trouble with steps or getting out of a bed or a chair? No   Current Diet Well Balanced Diet   Dental Exam Up to date   Eye Exam Up to date   Exercise (times per week) 2 times per week   Current Exercise Activities Include Swimming   Do you need help using the phone?  No   Are you deaf or do you have serious difficulty hearing?  No   Do you need help with transportation? No   Do you need help shopping? No   Do you need help preparing meals?  No   Do you need help with housework?  No   Do you need help with laundry? No   Do you need help taking your medications? No   Do you need help managing money? No   Do you ever drive or ride in a car without wearing a seat belt? No   Have you felt unusual stress, anger or loneliness in the last month? No   Who do you live with? Alone   If you need help, do you have trouble finding someone available to you? No   Have you been bothered in the last four weeks by sexual problems? No   Do you have  difficulty concentrating, remembering or making decisions? No       Compared to one year ago, the patient feels her physical health is the same and her mental health is the same.    Depression Screen:  PHQ-2/PHQ-9 Depression Screening 8/20/2019   Little interest or pleasure in doing things 0   Feeling down, depressed, or hopeless 0   Trouble falling or staying asleep, or sleeping too much 0   Feeling tired or having little energy 0   Poor appetite or overeating 0   Feeling bad about yourself - or that you are a failure or have let yourself or your family down 0   Trouble concentrating on things, such as reading the newspaper or watching television 0   Moving or speaking so slowly that other people could have noticed. Or the opposite - being so fidgety or restless that you have been moving around a lot more than usual 0   Thoughts that you would be better off dead, or of hurting yourself in some way 0   Total Score 0         Past Medical/Family/Social History:  The following portions of the patient's history were reviewed and updated as appropriate: allergies, current medications, past family history, past medical history, past social history, past surgical history and problem list.    Allergies   Allergen Reactions   • Hydrocodone-Acetaminophen Shortness Of Breath   • Oxycodone-Acetaminophen Shortness Of Breath   • Peanut Oil Shortness Of Breath   • Codeine    • Morphine And Related    • Statins          Current Outpatient Medications:   •  aMILoride-hydrochlorothiazide (MODURETIC) 5-50 MG per tablet, Take 1 tablet by mouth Daily., Disp: 90 tablet, Rfl: 3  •  aspirin 81 MG tablet, Take  by mouth daily., Disp: , Rfl:   •  Cholecalciferol (VITAMIN D PO), Take  by mouth daily., Disp: , Rfl:   •  Coenzyme Q10 (CO Q 10 PO), Take  by mouth daily., Disp: , Rfl:   •  fluticasone (FLONASE) 50 MCG/ACT nasal spray, 2 sprays into the nostril(s) as directed by provider Daily. Administer 2 sprays in each nostril for each dose.,  Disp: 1 bottle, Rfl: 11  •  folic acid (FOLVITE) 400 MCG tablet, Take  by mouth daily., Disp: , Rfl:   •  losartan (COZAAR) 50 MG tablet, Take 1 tablet by mouth Daily., Disp: 90 tablet, Rfl: 3  •  Omega 3 1000 MG capsule, Take  by mouth daily., Disp: , Rfl:   •  raNITIdine (ZANTAC) 300 MG tablet, Take 1 tablet by mouth Every Night., Disp: 90 tablet, Rfl: 3  •  cefdinir (OMNICEF) 300 MG capsule, Take 1 capsule by mouth 2 (Two) Times a Day., Disp: 20 capsule, Rfl: 1    Aspirin use counseling: Taking ASA appropriately as indicated    Current medication list contains no high risk medications.  No harmful drug interactions have been identified.     Family History   Problem Relation Age of Onset   • Heart disease Mother    • Heart disease Father    • Bone cancer Sister    • Heart disease Brother    • Stroke Paternal Grandfather    • Hypertension Other        Social History     Tobacco Use   • Smoking status: Never Smoker   • Smokeless tobacco: Never Used   • Tobacco comment: caffeine use/ 3 CUPS DAILY    Substance Use Topics   • Alcohol use: No       Past Surgical History:   Procedure Laterality Date   • FOOT SURGERY     • HYSTERECTOMY     • KNEE SURGERY     • SPINE SURGERY         Patient Active Problem List   Diagnosis   • Hypertension   • Coronary artery disease involving native coronary artery of native heart without angina pectoris   • Medicare annual wellness visit, subsequent   • Seasonal allergies   • Visit for screening mammogram   • GERD (gastroesophageal reflux disease)   • At risk for bone density loss   • Postmenopausal       Review of Systems   Constitutional: Negative for activity change, fatigue, fever and unexpected weight change.   HENT: Negative for tinnitus, trouble swallowing and voice change.    Eyes: Negative for visual disturbance.   Respiratory: Negative for cough, choking, chest tightness, shortness of breath and wheezing.    Cardiovascular: Negative for chest pain, palpitations and leg swelling.  "  Gastrointestinal: Negative for abdominal pain, blood in stool, constipation, diarrhea, nausea and vomiting.   Genitourinary: Negative for dysuria, enuresis, frequency, hematuria and urgency.   Musculoskeletal: Negative for arthralgias, back pain, gait problem and myalgias.   Skin: Negative for rash and wound.   Neurological: Negative for tremors, syncope, weakness and light-headedness.   Hematological: Negative for adenopathy. Does not bruise/bleed easily.   Psychiatric/Behavioral: Negative for behavioral problems, decreased concentration and sleep disturbance. The patient is not nervous/anxious.        Objective     Vitals:    08/20/19 1038 08/20/19 1137   BP: 160/86 138/76   BP Location:  Right arm   Patient Position:  Sitting   Cuff Size: Large Adult Large Adult   Pulse: 60    Temp: 97.7 °F (36.5 °C)    TempSrc: Oral    SpO2: 98%    Weight: 81.6 kg (179 lb 12.8 oz)    Height: 162.6 cm (64\")        Patient's Body mass index is 30.86 kg/m². BMI is above normal parameters. Recommendations include: exercise counseling and nutrition counseling.      No exam data present    The patient has no evidence of cognitve impairment.     Physical Exam   Constitutional: She is oriented to person, place, and time. She appears well-developed and well-nourished. No distress.   HENT:   Head: Normocephalic and atraumatic.   Right Ear: External ear normal.   Left Ear: External ear normal.   Nose: Nose normal.   Mouth/Throat: Oropharynx is clear and moist.   Eyes: Conjunctivae and EOM are normal. Pupils are equal, round, and reactive to light.   Neck: Normal range of motion. Neck supple. No tracheal deviation present. No thyromegaly present.   Cardiovascular: Normal rate, regular rhythm and intact distal pulses. Exam reveals no gallop and no friction rub.   Murmur heard.   Systolic murmur is present with a grade of 1/6.  Pulmonary/Chest: Effort normal and breath sounds normal. No respiratory distress.   Abdominal: Soft. Bowel sounds " are normal. She exhibits no mass. There is no tenderness. There is no guarding.   Musculoskeletal: Normal range of motion. She exhibits no edema.   Lymphadenopathy:     She has no cervical adenopathy.   Neurological: She is alert and oriented to person, place, and time. She displays normal reflexes. She exhibits normal muscle tone.   Skin: Skin is warm and dry. Capillary refill takes less than 2 seconds. No rash noted. She is not diaphoretic.   Psychiatric: She has a normal mood and affect. Her behavior is normal. Judgment and thought content normal.   Nursing note and vitals reviewed.      Recent Lab Results:          Assessment/Plan   Age-appropriate Screening Schedule:  Refer to the list below for future screening recommendations based on patient's age, sex and/or medical conditions.      Health Maintenance   Topic Date Due   • TDAP/TD VACCINES (1 - Tdap) 10/09/1961   • PNEUMOCOCCAL VACCINES (65+ LOW/MEDIUM RISK) (1 of 2 - PCV13) 10/09/2007   • ZOSTER VACCINE (2 of 2) 02/26/2010   • MAMMOGRAM  07/31/2017   • INFLUENZA VACCINE  08/01/2019   • COLONOSCOPY  11/20/2024       Medicare Risks and Personalized Health Plan:  Advance Directive Discussion  Breast Cancer/Mammogram Screening  Fall Risk  Immunizations Discussed/Encouraged (specific immunizations; Hepatitis A Vaccine/Series, Influenza and Shingrix )  Obesity/Overweight       CMS-Preventive Services Quick Reference  Medicare Preventive Services Addressed:  Annual Wellness Visit (AWV)  Cardiovascular Disease Screening Tests (may do this order every 5 years in beneficiaries without signs or symptoms of cardiovascular disease)  Diabetes Screening-Lab Order for either glucose quantitative blood (except reagent strip), glucose;post glucose dose(includes glucose), or glucose tolerance test-3 specimens(includes glucose)  Screening Mammography     Advance Care Planning:  Patient has an advance directive - a copy has not been provided. Have asked the patient to send this  to us to add to record    Diagnoses and all orders for this visit:    1. Medicare annual wellness visit, subsequent (Primary)  -     Comprehensive Metabolic Panel  -     Lipid Panel    2. Essential hypertension  -     Comprehensive Metabolic Panel  -     Lipid Panel    3. Visit for screening mammogram  -     Mammo Screening Bilateral With CAD; Future    4. Seasonal allergies  -     fluticasone (FLONASE) 50 MCG/ACT nasal spray; 2 sprays into the nostril(s) as directed by provider Daily. Administer 2 sprays in each nostril for each dose.  Dispense: 1 bottle; Refill: 11    5. Coronary artery disease involving native coronary artery of native heart without angina pectoris    6. Gastroesophageal reflux disease without esophagitis  -     raNITIdine (ZANTAC) 300 MG tablet; Take 1 tablet by mouth Every Night.  Dispense: 90 tablet; Refill: 3    7. At risk for bone density loss  -     DEXA Bone Density Axial; Future    8. Postmenopausal  -     DEXA Bone Density Axial; Future    Other orders  -     cefdinir (OMNICEF) 300 MG capsule; Take 1 capsule by mouth 2 (Two) Times a Day.  Dispense: 20 capsule; Refill: 1        An After Visit Summary and PPPS with all of these plans were given to the patient.      Follow Up:  No Follow-up on file.        Discussed immunizations and recommend flu shot in the fall annually and to have the shingrix series and second hepatitis A shot at the pharmacy.  Will check the labs as ordered for vascular screening and diabetes screening.  Will need mammogram in November.  Encouraged diet and exercise to help with the weight.  Discussed fall risk and avoid throw rugs and to have grab bars in the bathroom.

## 2019-08-29 LAB
ALBUMIN SERPL-MCNC: 4.6 G/DL (ref 3.5–5.2)
ALBUMIN/GLOB SERPL: 1.8 G/DL
ALP SERPL-CCNC: 80 U/L (ref 39–117)
ALT SERPL-CCNC: 14 U/L (ref 1–33)
AST SERPL-CCNC: 21 U/L (ref 1–32)
BILIRUB SERPL-MCNC: 0.5 MG/DL (ref 0.2–1.2)
BUN SERPL-MCNC: 13 MG/DL (ref 8–23)
BUN/CREAT SERPL: 14.9 (ref 7–25)
CALCIUM SERPL-MCNC: 10.1 MG/DL (ref 8.6–10.5)
CHLORIDE SERPL-SCNC: 87 MMOL/L (ref 98–107)
CHOLEST SERPL-MCNC: 212 MG/DL (ref 0–200)
CO2 SERPL-SCNC: 30.9 MMOL/L (ref 22–29)
CREAT SERPL-MCNC: 0.87 MG/DL (ref 0.57–1)
GLOBULIN SER CALC-MCNC: 2.5 GM/DL
GLUCOSE SERPL-MCNC: 95 MG/DL (ref 65–99)
HDLC SERPL-MCNC: 86 MG/DL (ref 40–60)
LDLC SERPL CALC-MCNC: 113 MG/DL (ref 0–100)
POTASSIUM SERPL-SCNC: 4.3 MMOL/L (ref 3.5–5.2)
PROT SERPL-MCNC: 7.1 G/DL (ref 6–8.5)
SODIUM SERPL-SCNC: 128 MMOL/L (ref 136–145)
TRIGL SERPL-MCNC: 66 MG/DL (ref 0–150)
VLDLC SERPL CALC-MCNC: 13.2 MG/DL

## 2019-09-02 RX ORDER — LOSARTAN POTASSIUM 50 MG/1
50 TABLET ORAL DAILY
Qty: 90 TABLET | Refills: 3 | Status: SHIPPED | OUTPATIENT
Start: 2019-09-02 | End: 2019-10-04 | Stop reason: SDUPTHER

## 2019-10-04 RX ORDER — LOSARTAN POTASSIUM 50 MG/1
50 TABLET ORAL DAILY
Qty: 90 TABLET | Refills: 3 | Status: SHIPPED | OUTPATIENT
Start: 2019-10-04 | End: 2020-07-16 | Stop reason: SDUPTHER

## 2019-10-04 NOTE — TELEPHONE ENCOUNTER
Pt called regarding the recall on Losartan with her current mail order pharmacy.  Pt contact her local pharmacy and they carry the lots that have not been recalled.  She requested a rx be sent to her local pharmacy.

## 2019-10-20 ENCOUNTER — IMMUNIZATION (OUTPATIENT)
Dept: RETAIL CLINIC | Facility: CLINIC | Age: 79
End: 2019-10-20

## 2019-10-20 DIAGNOSIS — Z23 NEEDS FLU SHOT: Primary | ICD-10-CM

## 2019-10-20 DIAGNOSIS — K21.9 GASTROESOPHAGEAL REFLUX DISEASE WITHOUT ESOPHAGITIS: ICD-10-CM

## 2019-10-20 PROCEDURE — G0008 ADMIN INFLUENZA VIRUS VAC: HCPCS | Performed by: NURSE PRACTITIONER

## 2019-10-20 PROCEDURE — 90653 IIV ADJUVANT VACCINE IM: CPT | Performed by: NURSE PRACTITIONER

## 2019-10-21 DIAGNOSIS — K21.9 GASTROESOPHAGEAL REFLUX DISEASE WITHOUT ESOPHAGITIS: Primary | ICD-10-CM

## 2019-10-21 RX ORDER — FAMOTIDINE 20 MG/1
20 TABLET, FILM COATED ORAL 2 TIMES DAILY
Qty: 60 TABLET | Refills: 11 | Status: SHIPPED | OUTPATIENT
Start: 2019-10-21 | End: 2020-07-15 | Stop reason: SDUPTHER

## 2019-10-23 RX ORDER — RANITIDINE 300 MG/1
300 TABLET ORAL NIGHTLY
Qty: 90 TABLET | Refills: 3 | OUTPATIENT
Start: 2019-10-23

## 2019-11-05 ENCOUNTER — HOSPITAL ENCOUNTER (OUTPATIENT)
Dept: MAMMOGRAPHY | Facility: HOSPITAL | Age: 79
Discharge: HOME | End: 2019-11-05
Admitting: INTERNAL MEDICINE

## 2019-11-05 ENCOUNTER — HOSPITAL ENCOUNTER (OUTPATIENT)
Dept: BONE DENSITY | Facility: HOSPITAL | Age: 79
Discharge: HOME | End: 2019-11-05

## 2019-11-05 DIAGNOSIS — Z78.0 POSTMENOPAUSAL: ICD-10-CM

## 2019-11-05 DIAGNOSIS — Z12.31 VISIT FOR SCREENING MAMMOGRAM: ICD-10-CM

## 2019-11-05 DIAGNOSIS — Z91.89 AT RISK FOR BONE DENSITY LOSS: ICD-10-CM

## 2019-11-05 PROCEDURE — 77067 SCR MAMMO BI INCL CAD: CPT

## 2019-11-05 PROCEDURE — 77080 DXA BONE DENSITY AXIAL: CPT

## 2019-11-11 ENCOUNTER — TELEPHONE (OUTPATIENT)
Dept: FAMILY MEDICINE CLINIC | Facility: CLINIC | Age: 79
End: 2019-11-11

## 2019-11-14 ENCOUNTER — TELEPHONE (OUTPATIENT)
Dept: CARDIOLOGY | Facility: CLINIC | Age: 79
End: 2019-11-14

## 2019-11-14 NOTE — TELEPHONE ENCOUNTER
Pt called wanting to know if  is okay with her starting on c-pap and if so would that help her with the intermittent shallow breathing she has been having.    Pt was advised that with a dx of sleep apnea we are in favor of using the cpap. She voiced understanding and stated she was in agreement

## 2020-07-15 ENCOUNTER — OFFICE VISIT (OUTPATIENT)
Dept: FAMILY MEDICINE CLINIC | Facility: CLINIC | Age: 80
End: 2020-07-15

## 2020-07-15 VITALS
HEART RATE: 51 BPM | DIASTOLIC BLOOD PRESSURE: 76 MMHG | TEMPERATURE: 97.8 F | OXYGEN SATURATION: 95 % | WEIGHT: 184 LBS | HEIGHT: 64 IN | BODY MASS INDEX: 31.41 KG/M2 | SYSTOLIC BLOOD PRESSURE: 122 MMHG

## 2020-07-15 DIAGNOSIS — K21.9 GASTROESOPHAGEAL REFLUX DISEASE WITHOUT ESOPHAGITIS: ICD-10-CM

## 2020-07-15 DIAGNOSIS — Z12.31 VISIT FOR SCREENING MAMMOGRAM: Primary | ICD-10-CM

## 2020-07-15 DIAGNOSIS — J30.2 SEASONAL ALLERGIES: ICD-10-CM

## 2020-07-15 DIAGNOSIS — I25.10 CORONARY ARTERY DISEASE INVOLVING NATIVE CORONARY ARTERY OF NATIVE HEART WITHOUT ANGINA PECTORIS: ICD-10-CM

## 2020-07-15 DIAGNOSIS — Z00.00 MEDICARE ANNUAL WELLNESS VISIT, SUBSEQUENT: Primary | ICD-10-CM

## 2020-07-15 DIAGNOSIS — I10 ESSENTIAL HYPERTENSION: ICD-10-CM

## 2020-07-15 PROCEDURE — 99214 OFFICE O/P EST MOD 30 MIN: CPT | Performed by: INTERNAL MEDICINE

## 2020-07-15 RX ORDER — CEFDINIR 300 MG/1
300 CAPSULE ORAL 2 TIMES DAILY
Qty: 20 CAPSULE | Refills: 1 | Status: SHIPPED | OUTPATIENT
Start: 2020-07-15 | End: 2021-02-18

## 2020-07-15 RX ORDER — FAMOTIDINE 20 MG/1
20 TABLET, FILM COATED ORAL 2 TIMES DAILY
Qty: 60 TABLET | Refills: 11 | Status: SHIPPED | OUTPATIENT
Start: 2020-07-15 | End: 2020-07-15 | Stop reason: SDUPTHER

## 2020-07-15 RX ORDER — FLUTICASONE PROPIONATE 50 MCG
2 SPRAY, SUSPENSION (ML) NASAL DAILY
Qty: 1 BOTTLE | Refills: 11 | Status: SHIPPED | OUTPATIENT
Start: 2020-07-15 | End: 2021-07-20

## 2020-07-15 RX ORDER — FAMOTIDINE 20 MG/1
20 TABLET, FILM COATED ORAL 2 TIMES DAILY
Qty: 60 TABLET | Refills: 11 | Status: SHIPPED | OUTPATIENT
Start: 2020-07-15 | End: 2020-07-29 | Stop reason: SDUPTHER

## 2020-07-15 RX ORDER — CEFDINIR 300 MG/1
300 CAPSULE ORAL 2 TIMES DAILY
Qty: 20 CAPSULE | Refills: 1 | Status: SHIPPED | OUTPATIENT
Start: 2020-07-15 | End: 2020-07-15 | Stop reason: SDUPTHER

## 2020-07-15 RX ORDER — FLUTICASONE PROPIONATE 50 MCG
2 SPRAY, SUSPENSION (ML) NASAL DAILY
Qty: 1 BOTTLE | Refills: 11 | Status: SHIPPED | OUTPATIENT
Start: 2020-07-15 | End: 2020-07-15 | Stop reason: SDUPTHER

## 2020-07-15 NOTE — PROGRESS NOTES
Subsequent Medicare Wellness Visit   The ABC's of the Annual Wellness Visit    Chief Complaint   Patient presents with   • Annual Exam       HPI:  Diana Bianchi, -1940, is a 79 y.o. female who presents for a Subsequent Medicare Wellness Visit.  Answers for HPI/ROS submitted by the patient on 2020   What is the primary reason for your visit?: Physical  Patient is here asking for a Medicare wellness visit.  She understands that her last Medicare wellness was done 2019 but she still wants to go ahead and do this today.  I have discussed with her that the insurance may not cover this and she is understanding.    Follow-up for hypertension.  Currently has been feeling well and asymptomatic without any headaches,vision changes, cough, chest pain, shortness of breath, swelling, focal neurologic deficit, memory loss or syncope.  Has been taking the medications regularly and adherent with the regimen, Denies medication side effects and no significant interval events.     Follow-up for history of coronary disease.  Is currently stable without any difficulties.  Needs to have her cholesterol checked again.  Has scheduled appointment with cardiology in the next month.    Recent Hospitalizations:  No hospitalization(s) within the last year..    Current Medical Providers:  Patient Care Team:  Manoj Nunes MD as PCP - General (Internal Medicine)    Health Habits and Functional and Cognitive Screening and Depression Screening:  Functional & Cognitive Status 7/15/2020   Do you have difficulty preparing food and eating? No   Do you have difficulty bathing yourself, getting dressed or grooming yourself? No   Do you have difficulty using the toilet? No   Do you have difficulty moving around from place to place? No   Do you have trouble with steps or getting out of a bed or a chair? No   Current Diet Well Balanced Diet   Dental Exam Up to date   Eye Exam Up to date   Exercise (times per week) 2 times per week    Current Exercise Activities Include Walking   Do you need help using the phone?  No   Are you deaf or do you have serious difficulty hearing?  No   Do you need help with transportation? No   Do you need help shopping? No   Do you need help preparing meals?  No   Do you need help with housework?  No   Do you need help with laundry? No   Do you need help taking your medications? No   Do you need help managing money? No   Do you ever drive or ride in a car without wearing a seat belt? No   Have you felt unusual stress, anger or loneliness in the last month? No   Who do you live with? Alone   If you need help, do you have trouble finding someone available to you? No   Have you been bothered in the last four weeks by sexual problems? No   Do you have difficulty concentrating, remembering or making decisions? No       Compared to one year ago, the patient feels her physical health is the same and her mental health is the same.    Depression Screen:  PHQ-2/PHQ-9 Depression Screening 7/15/2020   Little interest or pleasure in doing things 0   Feeling down, depressed, or hopeless 0   Trouble falling or staying asleep, or sleeping too much 0   Feeling tired or having little energy 0   Poor appetite or overeating 0   Feeling bad about yourself - or that you are a failure or have let yourself or your family down 0   Trouble concentrating on things, such as reading the newspaper or watching television 0   Moving or speaking so slowly that other people could have noticed. Or the opposite - being so fidgety or restless that you have been moving around a lot more than usual 0   Thoughts that you would be better off dead, or of hurting yourself in some way 0   Total Score 0     Falls Risk Assessment:  ANYA Fall Risk Clinician Key Questions   Have you fallen in the past year?: No  Do you feel unsteady with walking?: No  Are you worried about falling?: No    Past Medical/Family/Social History:  The following portions of the  patient's history were reviewed and updated as appropriate: allergies, current medications, past family history, past medical history, past social history, past surgical history and problem list.    Allergies   Allergen Reactions   • Codeine Anaphylaxis   • Hydrocodone-Acetaminophen Shortness Of Breath   • Morphine And Related Anaphylaxis   • Oxycodone-Acetaminophen Shortness Of Breath   • Peanut Oil Shortness Of Breath   • Statins Other (See Comments)             Current Outpatient Medications:   •  aMILoride-hydrochlorothiazide (MODURETIC) 5-50 MG per tablet, Take 1 tablet by mouth Daily., Disp: 90 tablet, Rfl: 3  •  aspirin 81 MG tablet, Take  by mouth daily., Disp: , Rfl:   •  cefdinir (OMNICEF) 300 MG capsule, Take 1 capsule by mouth 2 (Two) Times a Day., Disp: 20 capsule, Rfl: 1  •  Cholecalciferol (VITAMIN D PO), Take  by mouth daily., Disp: , Rfl:   •  Coenzyme Q10 (CO Q 10 PO), Take  by mouth daily., Disp: , Rfl:   •  famotidine (Pepcid) 20 MG tablet, Take 1 tablet by mouth 2 (Two) Times a Day., Disp: 60 tablet, Rfl: 11  •  fluticasone (FLONASE) 50 MCG/ACT nasal spray, 2 sprays into the nostril(s) as directed by provider Daily. Administer 2 sprays in each nostril for each dose., Disp: 1 bottle, Rfl: 11  •  folic acid (FOLVITE) 400 MCG tablet, Take  by mouth daily., Disp: , Rfl:   •  losartan (COZAAR) 50 MG tablet, Take 1 tablet by mouth Daily., Disp: 90 tablet, Rfl: 3  •  Omega 3 1000 MG capsule, Take  by mouth daily., Disp: , Rfl:     Aspirin use counseling: Taking ASA appropriately as indicated    Current medication list contains no high risk medications.  No harmful drug interactions have been identified.     Family History   Problem Relation Age of Onset   • Heart disease Mother    • Heart disease Father    • Bone cancer Sister    • Breast cancer Sister    • Heart disease Brother    • Stroke Paternal Grandfather    • Hypertension Other        Social History     Tobacco Use   • Smoking status: Never  Smoker   • Smokeless tobacco: Never Used   • Tobacco comment: caffeine use/ 3 CUPS DAILY    Substance Use Topics   • Alcohol use: No       Past Surgical History:   Procedure Laterality Date   • FOOT SURGERY     • HYSTERECTOMY     • KNEE SURGERY     • OOPHORECTOMY     • SPINE SURGERY         Patient Active Problem List   Diagnosis   • Hypertension   • Coronary artery disease involving native coronary artery of native heart without angina pectoris   • Medicare annual wellness visit, subsequent   • Seasonal allergies   • Visit for screening mammogram   • GERD (gastroesophageal reflux disease)   • At risk for bone density loss   • Postmenopausal       Review of Systems   Constitutional: Negative for activity change, appetite change, fatigue, fever, unexpected weight gain and unexpected weight loss.   HENT: Negative for nosebleeds, rhinorrhea, trouble swallowing and voice change.    Eyes: Negative for visual disturbance.   Respiratory: Negative for cough, chest tightness, shortness of breath and wheezing.    Cardiovascular: Negative for chest pain, palpitations and leg swelling.   Gastrointestinal: Negative for abdominal pain, blood in stool, constipation, diarrhea, nausea, vomiting, GERD and indigestion.   Genitourinary: Negative for dysuria, frequency and hematuria.   Musculoskeletal: Negative for arthralgias, back pain and myalgias.   Skin: Negative for rash and bruise.   Neurological: Negative for dizziness, tremors, weakness, light-headedness, numbness, headache and memory problem.   Hematological: Negative for adenopathy. Does not bruise/bleed easily.   Psychiatric/Behavioral: Negative for sleep disturbance and depressed mood. The patient is not nervous/anxious.        Objective     Vitals:    07/15/20 0811   BP: 122/76   BP Location: Left arm   Patient Position: Sitting   Cuff Size: Large Adult   Pulse: 51   Temp: 97.8 °F (36.6 °C)   TempSrc: Temporal   SpO2: 95%   Weight: 83.5 kg (184 lb)   Height: 162.6 cm  "(64.02\")       Patient's Body mass index is 31.57 kg/m². BMI is above normal parameters. Recommendations include: exercise counseling and nutrition counseling.      No exam data present    The patient has no evidence of cognitve impairment.     Physical Exam   Constitutional: She is oriented to person, place, and time. She appears well-developed and well-nourished.   HENT:   Head: Normocephalic and atraumatic.   Eyes: Pupils are equal, round, and reactive to light. Conjunctivae and EOM are normal.   Neck: Normal range of motion. Neck supple. No tracheal deviation present. No thyromegaly present.   Cardiovascular: Normal rate, regular rhythm and normal heart sounds. Exam reveals no gallop and no friction rub.   No murmur heard.  Pulmonary/Chest: Effort normal and breath sounds normal. No respiratory distress. She exhibits no tenderness.   Abdominal: Soft. Bowel sounds are normal. She exhibits no distension. There is no tenderness.   Musculoskeletal: Normal range of motion. She exhibits no edema or tenderness.   Lymphadenopathy:     She has no cervical adenopathy.   Neurological: She is alert and oriented to person, place, and time.   Skin: Skin is warm and dry.   Psychiatric: She has a normal mood and affect. Her behavior is normal. Judgment and thought content normal.   Nursing note and vitals reviewed.      Recent Lab Results:  Lab Results   Component Value Date    GLU 95 08/28/2019     Lab Results   Component Value Date    TRIG 66 08/28/2019    HDL 86 (H) 08/28/2019    VLDL 13.2 08/28/2019       Assessment/Plan   Age-appropriate Screening Schedule:  Refer to the list below for future screening recommendations based on patient's age, sex and/or medical conditions.      Health Maintenance   Topic Date Due   • ZOSTER VACCINE (2 of 3) 08/31/2021 (Originally 2/26/2010)   • INFLUENZA VACCINE  08/01/2020   • MAMMOGRAM  11/05/2021   • COLONOSCOPY  11/20/2024   • TDAP/TD VACCINES (2 - Td) 01/02/2028       Medicare Risks " and Personalized Health Plan:  Advance Directive Discussion  Fall Risk  Glaucoma Risk  Obesity/Overweight       CMS-Preventive Services Quick Reference  Medicare Preventive Services Addressed:  Annual Wellness Visit (AWV)  Glaucoma screening (for individuals with diabetes mellitus, family history of glaucoma, -Americans (> or =) age 50, -Americans (> or =) age 65)    Advance Care Planning:  ACP discussion was held with the patient during this visit. Patient has an advance directive (not in EMR), copy requested.    Diagnoses and all orders for this visit:    1. Medicare annual wellness visit, subsequent (Primary)    2. Essential hypertension  -     Comprehensive Metabolic Panel  -     Lipid Panel    3. Coronary artery disease involving native coronary artery of native heart without angina pectoris  -     Comprehensive Metabolic Panel  -     Lipid Panel    4. Seasonal allergies  -     fluticasone (FLONASE) 50 MCG/ACT nasal spray; 2 sprays into the nostril(s) as directed by provider Daily. Administer 2 sprays in each nostril for each dose.  Dispense: 1 bottle; Refill: 11    5. Gastroesophageal reflux disease without esophagitis  -     famotidine (Pepcid) 20 MG tablet; Take 1 tablet by mouth 2 (Two) Times a Day.  Dispense: 60 tablet; Refill: 11    Other orders  -     cefdinir (OMNICEF) 300 MG capsule; Take 1 capsule by mouth 2 (Two) Times a Day.  Dispense: 20 capsule; Refill: 1      Reviewed history and annual wellness visit with patient during office time.  Medications reviewed as appropriate.  Discussed advanced directives and living will.  Patient has living will: Living will: yes and patient will bring copy to office.  Discussed fall risk and precautions encourage removing throw rugs and using grab bars within the home and bathroom.  Will check the labs as ordered above to evaluate the blood sugars, kidney, liver, cholesterol for screening.  Discussed flu shot recommended to get the high-dose  influenza vaccine annually in the fall.  Prevnar-13 and pneumovax-23 up to date and appropriate.  Shingrix vaccination series recommended but patient refuses.  Encourage follow-up with the eye doctor on annual basis for glaucoma evaluation.  Discussed weight and encouraged exercise as tolerated while following a healthy diet. Recommend to get annual mammograms.   Hypertension controlled and no changes are needed at this time.  Will check the labs as ordered.  Follow up with cardiology Dr Hudson.    An After Visit Summary and PPPS with all of these plans were given to the patient.      Follow Up:  No follow-ups on file.

## 2020-07-16 LAB
ALBUMIN SERPL-MCNC: 4.7 G/DL (ref 3.5–5.2)
ALBUMIN/GLOB SERPL: 2.1 G/DL
ALP SERPL-CCNC: 80 U/L (ref 39–117)
ALT SERPL-CCNC: 14 U/L (ref 1–33)
AST SERPL-CCNC: 20 U/L (ref 1–32)
BILIRUB SERPL-MCNC: 0.4 MG/DL (ref 0–1.2)
BUN SERPL-MCNC: 20 MG/DL (ref 8–23)
BUN/CREAT SERPL: 21.5 (ref 7–25)
CALCIUM SERPL-MCNC: 10.5 MG/DL (ref 8.6–10.5)
CHLORIDE SERPL-SCNC: 94 MMOL/L (ref 98–107)
CHOLEST SERPL-MCNC: 216 MG/DL (ref 0–200)
CO2 SERPL-SCNC: 28.8 MMOL/L (ref 22–29)
CREAT SERPL-MCNC: 0.93 MG/DL (ref 0.57–1)
GLOBULIN SER CALC-MCNC: 2.2 GM/DL
GLUCOSE SERPL-MCNC: 92 MG/DL (ref 65–99)
HDLC SERPL-MCNC: 97 MG/DL (ref 40–60)
LDLC SERPL CALC-MCNC: 108 MG/DL (ref 0–100)
POTASSIUM SERPL-SCNC: 4.9 MMOL/L (ref 3.5–5.2)
PROT SERPL-MCNC: 6.9 G/DL (ref 6–8.5)
SODIUM SERPL-SCNC: 133 MMOL/L (ref 136–145)
TRIGL SERPL-MCNC: 53 MG/DL (ref 0–150)
VLDLC SERPL CALC-MCNC: 10.6 MG/DL

## 2020-07-16 RX ORDER — LOSARTAN POTASSIUM 50 MG/1
50 TABLET ORAL DAILY
Qty: 90 TABLET | Refills: 0 | Status: SHIPPED | OUTPATIENT
Start: 2020-07-16 | End: 2020-09-21 | Stop reason: SDUPTHER

## 2020-07-21 ENCOUNTER — TELEPHONE (OUTPATIENT)
Dept: FAMILY MEDICINE CLINIC | Facility: CLINIC | Age: 80
End: 2020-07-21

## 2020-07-21 RX ORDER — AMILORIDE HYDROCHLORIDE AND HYDROCHLOROTHIAZIDE 5; 50 MG/1; MG/1
1 TABLET ORAL DAILY
Qty: 90 TABLET | Refills: 1 | Status: SHIPPED | OUTPATIENT
Start: 2020-07-21 | End: 2020-08-10 | Stop reason: SDUPTHER

## 2020-07-29 DIAGNOSIS — K21.9 GASTROESOPHAGEAL REFLUX DISEASE WITHOUT ESOPHAGITIS: ICD-10-CM

## 2020-07-29 RX ORDER — FAMOTIDINE 20 MG/1
20 TABLET, FILM COATED ORAL 2 TIMES DAILY
Qty: 60 TABLET | Refills: 2 | Status: SHIPPED | OUTPATIENT
Start: 2020-07-29 | End: 2021-06-08 | Stop reason: SDUPTHER

## 2020-08-10 RX ORDER — AMILORIDE HYDROCHLORIDE AND HYDROCHLOROTHIAZIDE 5; 50 MG/1; MG/1
1 TABLET ORAL DAILY
Qty: 90 TABLET | Refills: 1 | Status: SHIPPED | OUTPATIENT
Start: 2020-08-10 | End: 2020-11-12 | Stop reason: SDUPTHER

## 2020-09-14 NOTE — PROGRESS NOTES
RM:________     PCP: Manoj Nunes MD    : 1940  AGE: 79 y.o.  EST PATIENT   REASON FOR VISIT/  CC:  30 MIN APPT  :)       WT: ____________ BP: __________L __________R HR______    CHEST PAIN: _____________    SOA: _____________PALPS: _______________     LIGHTHEADED: ___________FATIGUE: ________________ EDEMA __________    ALLERGIES:Codeine, Hydrocodone-acetaminophen, Morphine and related, Oxycodone-acetaminophen, Peanut oil, and Statins SMOKING HISTORY:  Social History     Tobacco Use   • Smoking status: Never Smoker   • Smokeless tobacco: Never Used   • Tobacco comment: caffeine use/ 3 CUPS DAILY    Substance Use Topics   • Alcohol use: No   • Drug use: No     CAFFEINE USE_________________  ALCOHOL ______________________    Below is the patient's most recent value for Albumin, ALT, AST, BUN, Calcium, Chloride, Cholesterol, CO2, Creatinine, GFR, Glucose, HDL, Hematocrit, Hemoglobin, Hemoglobin A1C, LDL, Magnesium, Phosphorus, Platelets, Potassium, PSA, Sodium, Triglycerides, TSH and WBC.   Lab Results   Component Value Date    ALBUMIN 4.70 07/15/2020    ALT 14 07/15/2020    AST 20 07/15/2020    BUN 20 07/15/2020    CALCIUM 10.5 07/15/2020    CL 94 (L) 07/15/2020    CO2 28.8 07/15/2020    CREATININE 0.93 07/15/2020    GLU 92 07/15/2020    HDL 97 (H) 07/15/2020     (H) 07/15/2020    K 4.9 07/15/2020     (L) 07/15/2020    TRIG 53 07/15/2020          NEW DIAGNOSIS/ SURGERY/ HOSP OR ED VISITS: ______________________    __________________________________________________________________      RECENT LABS OR DIAGNOSTIC TESTING:  _____________________________    __________________________________________________________________      ASSESSMENT/ PLAN: _______________________________________________    __________________________________________________________________

## 2020-09-15 NOTE — PROGRESS NOTES
Date of Office Visit: 2020  Encounter Provider: Vincenzo Hudson MD  Place of Service: [facility] CARDIOLOGY  Patient Name: Diana Bianchi  :1940    Chief Complaint   Patient presents with   • Coronary Artery Disease   :     HPI: Diana Bianchi is a 79 y.o. female who presents today to followup.      She suffered a myocardial infarction in  while living in [place] and was treated with TPA. She moved to [place] in  and had a cardiac catheterization in . She did not have any type of intervention at that time, and has been treated with medical therapy.  She also has a longstanding hypertension.     In 2017, I noted some lateral T wave inversions; I ordered a Myoview stress which was normal.     In 2019, I noted that she had sinus bradycardia.  A Holter showed good heart rate variability with an average rate of 61 bpm. It did show a 20% burden of monomorphic PVCs.  She walked on a modified Omer treadmill for nine minutes with good HR response.    She has generally done very well She denies chest pain, dyspnea, orthopnea, edema, palpitations, or syncope. However, because of COVID, she has not been able to swim or exercise like usual, and she has gained a little bit of weight.     Past Medical History:   Diagnosis Date   • ASCVD (arteriosclerotic cardiovascular disease)     MI , treated in [place] with tPA.  Cath in  in [place], report unavailable, but no stenting was done.   • Claustrophobia    • Concussion    • Endometrial cancer (CMS/HCC)    • GERD (gastroesophageal reflux disease)    • Hypertension    • MICHAELLE (obstructive sleep apnea)    • PVCs (premature ventricular contractions) 2020       Past Surgical History:   Procedure Laterality Date   • FOOT SURGERY     • HYSTERECTOMY     • KNEE SURGERY     • OOPHORECTOMY     • SPINE SURGERY         Social History     Socioeconomic History   • Marital status: Single     Spouse name: Not on file   • Number of  children: Not on file   • Years of education: Not on file   • Highest education level: Not on file   Tobacco Use   • Smoking status: Never Smoker   • Smokeless tobacco: Never Used   • Tobacco comment: caffeine use/ 3 CUPS DAILY    Substance and Sexual Activity   • Alcohol use: No   • Drug use: No       Family History   Problem Relation Age of Onset   • Heart disease Mother    • Heart disease Father    • Bone cancer Sister    • Breast cancer Sister    • Heart disease Brother    • Stroke Paternal Grandfather    • Hypertension Other        Review of Systems   Unable to perform ROS  Constitution: Positive for malaise/fatigue.   Cardiovascular: Negative for chest pain and palpitations.   Respiratory: Negative for shortness of breath.    Musculoskeletal: Positive for myalgias.   Neurological: Negative for light-headedness.   All other systems reviewed and are negative.      Allergies   Allergen Reactions   • Codeine Anaphylaxis   • Hydrocodone-Acetaminophen Shortness Of Breath   • Morphine And Related Anaphylaxis   • Oxycodone-Acetaminophen Shortness Of Breath   • Peanut Oil Shortness Of Breath   • Statins Other (See Comments)               Current Outpatient Medications:   •  aMILoride-hydrochlorothiazide (MODURETIC) 5-50 MG per tablet, Take 1 tablet by mouth Daily., Disp: 90 tablet, Rfl: 1  •  aspirin 81 MG tablet, Take  by mouth daily., Disp: , Rfl:   •  cefdinir (OMNICEF) 300 MG capsule, Take 1 capsule by mouth 2 (Two) Times a Day., Disp: 20 capsule, Rfl: 1  •  Cholecalciferol (VITAMIN D PO), Take  by mouth daily., Disp: , Rfl:   •  Coenzyme Q10 (CO Q 10 PO), Take  by mouth daily., Disp: , Rfl:   •  famotidine (Pepcid) 20 MG tablet, Take 1 tablet by mouth 2 (Two) Times a Day. (Patient taking differently: Take 20 mg by mouth Daily.), Disp: 60 tablet, Rfl: 2  •  fluticasone (FLONASE) 50 MCG/ACT nasal spray, 2 sprays into the nostril(s) as directed by provider Daily. Administer 2 sprays in each nostril for each dose.  "(Patient taking differently: 2 sprays into the nostril(s) as directed by provider every night at bedtime. Administer 2 sprays in each nostril for each dose. ), Disp: 1 bottle, Rfl: 11  •  folic acid (FOLVITE) 400 MCG tablet, Take  by mouth daily., Disp: , Rfl:   •  losartan (COZAAR) 50 MG tablet, Take 1 tablet by mouth Daily., Disp: 90 tablet, Rfl: 0  •  Omega 3 1000 MG capsule, Take  by mouth daily., Disp: , Rfl:      Objective:     Vitals:    09/17/20 1311   BP: 148/86   Pulse: 51   SpO2: 97%   Weight: 82.9 kg (182 lb 12.8 oz)   Height: 162.6 cm (64\")     Body mass index is 31.38 kg/m².    Physical Exam   Constitutional: She is oriented to person, place, and time. She appears well-developed and well-nourished.   HENT:   Head: Normocephalic.   Nose: Nose normal.   Mouth/Throat: Oropharynx is clear and moist.   Eyes: Pupils are equal, round, and reactive to light. Conjunctivae and EOM are normal.   Neck: Normal range of motion. No JVD present.   Cardiovascular: Regular rhythm, normal heart sounds and intact distal pulses. Bradycardia present.   No murmur heard.  Pulmonary/Chest: Effort normal and breath sounds normal.   Abdominal: Soft. There is no abdominal tenderness.   Musculoskeletal: Normal range of motion.         General: Edema (doughy legs but no swelling) present.   Neurological: She is alert and oriented to person, place, and time. No cranial nerve deficit.   Skin: Skin is warm and dry. No rash noted.   Psychiatric: She has a normal mood and affect. Her behavior is normal. Judgment and thought content normal.   Vitals reviewed.        ECG 12 Lead    Date/Time: 9/17/2020 1:26 PM  Performed by: Vincenzo Hudson MD  Authorized by: Vincenzo Hudson MD   Comparison: compared with previous ECG   Similar to previous ECG  Rhythm: sinus bradycardia  Conduction: conduction normal  ST Segments: ST segments normal  T Waves: T waves normal  QRS axis: normal  Other: no other findings    Clinical impression: normal " ECG            Assessment:       Diagnosis Plan   1. Coronary artery disease involving native coronary artery of native heart without angina pectoris     2. Essential hypertension     3. Bradycardia, sinus     4. PVCs (premature ventricular contractions)            Plan:     1.  Coronary Artery Disease  Assessment  • The patient has no angina    Subjective - Objective  • There is a history of past MI 1991  • Current antiplatelet therapy includes aspirin 81 mg    She is intolerant of statins.  A Myoview was normal in 2017.    2.  Her BP is generally within goal at home.  It is mildly elevated today.  I'm not making changes, though, because she starting to exercise more and I suspect it will improve.    3.  She typically runs in the low 50s.  She has asymptomatic, monomorphic PVCs.  Because of glenda rate, I can't add a beta blocker.     Sincerely,       Vincenzo Hudson MD

## 2020-09-17 ENCOUNTER — OFFICE VISIT (OUTPATIENT)
Dept: CARDIOLOGY | Facility: CLINIC | Age: 80
End: 2020-09-17

## 2020-09-17 VITALS
HEIGHT: 64 IN | DIASTOLIC BLOOD PRESSURE: 86 MMHG | WEIGHT: 182.8 LBS | HEART RATE: 51 BPM | BODY MASS INDEX: 31.21 KG/M2 | SYSTOLIC BLOOD PRESSURE: 148 MMHG | OXYGEN SATURATION: 97 %

## 2020-09-17 DIAGNOSIS — I10 ESSENTIAL HYPERTENSION: ICD-10-CM

## 2020-09-17 DIAGNOSIS — R00.1 BRADYCARDIA, SINUS: ICD-10-CM

## 2020-09-17 DIAGNOSIS — I49.3 PVCS (PREMATURE VENTRICULAR CONTRACTIONS): ICD-10-CM

## 2020-09-17 DIAGNOSIS — I25.10 CORONARY ARTERY DISEASE INVOLVING NATIVE CORONARY ARTERY OF NATIVE HEART WITHOUT ANGINA PECTORIS: Primary | ICD-10-CM

## 2020-09-17 PROCEDURE — 99213 OFFICE O/P EST LOW 20 MIN: CPT | Performed by: INTERNAL MEDICINE

## 2020-09-17 PROCEDURE — 93000 ELECTROCARDIOGRAM COMPLETE: CPT | Performed by: INTERNAL MEDICINE

## 2020-09-21 RX ORDER — LOSARTAN POTASSIUM 50 MG/1
50 TABLET ORAL DAILY
Qty: 90 TABLET | Refills: 0 | Status: SHIPPED | OUTPATIENT
Start: 2020-09-21 | End: 2020-11-12 | Stop reason: SDUPTHER

## 2020-11-12 RX ORDER — AMILORIDE HYDROCHLORIDE AND HYDROCHLOROTHIAZIDE 5; 50 MG/1; MG/1
1 TABLET ORAL DAILY
Qty: 90 TABLET | Refills: 3 | Status: SHIPPED | OUTPATIENT
Start: 2020-11-12 | End: 2021-11-29

## 2020-11-12 RX ORDER — LOSARTAN POTASSIUM 50 MG/1
50 TABLET ORAL DAILY
Qty: 90 TABLET | Refills: 3 | Status: SHIPPED | OUTPATIENT
Start: 2020-11-12 | End: 2021-10-13

## 2020-12-10 ENCOUNTER — TELEPHONE (OUTPATIENT)
Dept: CARDIOLOGY | Facility: CLINIC | Age: 80
End: 2020-12-10

## 2020-12-10 DIAGNOSIS — R00.1 BRADYCARDIA: Primary | ICD-10-CM

## 2020-12-10 NOTE — TELEPHONE ENCOUNTER
Pt recently had cataract surgery with [facility], pt reports that the anesthesiologist suggested that pt contact her cardiologist to discuss getting a monitor placed due to cardiac pauses preop and postop.  She states that she didn't have any symptoms during the post op, but as the nurse or tech was sitting with her in recovery she was showing pt the pauses on the monitor.      Note: if we need to get progress notes this may be a little tricky, [facility] is switching over to [facility].

## 2020-12-11 NOTE — TELEPHONE ENCOUNTER
Don't worry about records, getting anesthesia and PACU records is almost impossible.     I want her to get a 24 hour Holter.  Please let her know and forward to scheduling.    efraín woodruff

## 2020-12-16 NOTE — TELEPHONE ENCOUNTER
Pt called back and left vm.  I called her back on the number that she left but got vm.  [phone].      It looks like is scheduled for her holter on 01/04/2021.

## 2020-12-30 ENCOUNTER — TELEPHONE (OUTPATIENT)
Dept: CARDIOLOGY | Facility: CLINIC | Age: 80
End: 2020-12-30

## 2020-12-30 NOTE — TELEPHONE ENCOUNTER
----- Message from Diana Bianchi sent at 12/28/2020 11:05 PM EST -----  Regarding: Non-Urgent Medical Question  Contact: [phone]  This is regard to my appointment on January 4th to  a heart monitor.  I will not put on my phone as this crazy chart says.  I am 80 and do not understand all. Give me a phone number and I will call and let someone know I am here BUT not installing something etc.  I am a person and expect to be treated as such.  So someone call me and give me instruction as to how to get in contact with place when I arrive.  I understand but I am 80 and not so swain and still expect to be treated with respect.  Thank you Diana Bianchi birthday 1940   Home phone  and cell phone [phone]

## 2020-12-30 NOTE — TELEPHONE ENCOUNTER
Spoke with pt and explained the process for coming in for the holter.  Pt verbalized understanding.

## 2021-01-06 ENCOUNTER — APPOINTMENT (OUTPATIENT)
Dept: MAMMOGRAPHY | Facility: HOSPITAL | Age: 81
End: 2021-01-06

## 2021-01-07 ENCOUNTER — APPOINTMENT (OUTPATIENT)
Dept: MAMMOGRAPHY | Facility: HOSPITAL | Age: 81
End: 2021-01-07

## 2021-02-02 ENCOUNTER — TELEPHONE (OUTPATIENT)
Dept: CARDIOLOGY | Facility: CLINIC | Age: 81
End: 2021-02-02

## 2021-02-02 NOTE — TELEPHONE ENCOUNTER
Elevated sodium is caused by dehydration, so the diuretic may be contributing.  She should try to remain hydrated and I will defer any changes to the nephrologist.    Please let her know.    efraín woodruff

## 2021-02-02 NOTE — TELEPHONE ENCOUNTER
Pt calling to inform us that she was asymptomatic but tested positive for Covid on 1/6/21, states she had her first dose of the vaccine done at [facility] recently. Also reports elevated sodium labs and is being referred to a kidney Dr. She is wondering if her moduretic is causing this. Please advise.      DA

## 2021-02-18 ENCOUNTER — OFFICE VISIT (OUTPATIENT)
Dept: FAMILY MEDICINE CLINIC | Facility: CLINIC | Age: 81
End: 2021-02-18

## 2021-02-18 DIAGNOSIS — J01.10 ACUTE NON-RECURRENT FRONTAL SINUSITIS: Primary | ICD-10-CM

## 2021-02-18 PROBLEM — U07.1 COVID-19 DETERMINED BY CLINICAL DIAGNOSTIC CRITERIA: Status: ACTIVE | Noted: 2020-01-06

## 2021-02-18 PROBLEM — M19.90 ARTHRITIS: Status: ACTIVE | Noted: 2020-01-10

## 2021-02-18 PROCEDURE — 99442 PR PHYS/QHP TELEPHONE EVALUATION 11-20 MIN: CPT | Performed by: INTERNAL MEDICINE

## 2021-02-18 RX ORDER — AMOXICILLIN 500 MG/1
500 CAPSULE ORAL 3 TIMES DAILY
Qty: 30 CAPSULE | Refills: 0 | Status: SHIPPED | OUTPATIENT
Start: 2021-02-18 | End: 2021-05-11

## 2021-02-18 NOTE — PROGRESS NOTES
Subjective   Diana Bianchi is a 80 y.o. female.     Chief Complaint   Patient presents with   • Sinusitis       History of Present Illness   You have chosen to receive care through a telephone visit. Do you consent to use a telephone visit for your medical care today? Yes    Complains of sinus congestion with headache and fullness in the right ear.  Has dry eyes and pressure behind the eyes and on right side of face.  Denies fever, chills, N, V, D, cough, short of breath, loss of smell or taste.  Patient was seen in [facility] but no provider present and was not given anything.    The following portions of the patient's history were reviewed and updated as appropriate: allergies, current medications, past family history, past medical history, past social history, past surgical history and problem list.    Depression Screen:  PHQ-2/PHQ-9 Depression Screening 7/15/2020   Little interest or pleasure in doing things 0   Feeling down, depressed, or hopeless 0   Trouble falling or staying asleep, or sleeping too much 0   Feeling tired or having little energy 0   Poor appetite or overeating 0   Feeling bad about yourself - or that you are a failure or have let yourself or your family down 0   Trouble concentrating on things, such as reading the newspaper or watching television 0   Moving or speaking so slowly that other people could have noticed. Or the opposite - being so fidgety or restless that you have been moving around a lot more than usual 0   Thoughts that you would be better off dead, or of hurting yourself in some way 0   Total Score 0       Past Medical History:   Diagnosis Date   • ASCVD (arteriosclerotic cardiovascular disease)     MI 1991, treated in [place] with tPA.  Cath in 2005 in [place], report unavailable, but no stenting was done.   • Claustrophobia    • Concussion    • Endometrial cancer (CMS/HCC)    • GERD (gastroesophageal reflux disease)    • Hypertension    • MICHAELLE (obstructive sleep apnea)    •  PVCs (premature ventricular contractions) 9/17/2020       Past Surgical History:   Procedure Laterality Date   • FOOT SURGERY     • HYSTERECTOMY     • KNEE SURGERY     • OOPHORECTOMY     • SPINE SURGERY         Family History   Problem Relation Age of Onset   • Heart disease Mother    • Heart disease Father    • Bone cancer Sister    • Breast cancer Sister    • Heart disease Brother    • Stroke Paternal Grandfather    • Hypertension Other        Social History     Socioeconomic History   • Marital status: Single     Spouse name: Not on file   • Number of children: Not on file   • Years of education: Not on file   • Highest education level: Not on file   Tobacco Use   • Smoking status: Never Smoker   • Smokeless tobacco: Never Used   • Tobacco comment: caffeine use/ 3 CUPS DAILY    Substance and Sexual Activity   • Alcohol use: No   • Drug use: No       Current Outpatient Medications   Medication Sig Dispense Refill   • aMILoride-hydrochlorothiazide (MODURETIC) 5-50 MG per tablet Take 1 tablet by mouth Daily. 90 tablet 3   • aspirin 81 MG tablet Take  by mouth daily.     • Cholecalciferol (VITAMIN D PO) Take  by mouth daily.     • Coenzyme Q10 (CO Q 10 PO) Take  by mouth daily.     • famotidine (Pepcid) 20 MG tablet Take 1 tablet by mouth 2 (Two) Times a Day. (Patient taking differently: Take 20 mg by mouth Daily.) 60 tablet 2   • fluticasone (FLONASE) 50 MCG/ACT nasal spray 2 sprays into the nostril(s) as directed by provider Daily. Administer 2 sprays in each nostril for each dose. (Patient taking differently: 2 sprays into the nostril(s) as directed by provider every night at bedtime. Administer 2 sprays in each nostril for each dose. ) 1 bottle 11   • folic acid (FOLVITE) 400 MCG tablet Take  by mouth daily.     • losartan (COZAAR) 50 MG tablet Take 1 tablet by mouth Daily. 90 tablet 3   • Omega 3 1000 MG capsule Take  by mouth daily.     • amoxicillin (AMOXIL) 500 MG capsule Take 1 capsule by mouth 3 (Three)  Times a Day. 30 capsule 0     No current facility-administered medications for this visit.        Review of Systems   Constitutional: Negative for activity change, appetite change, fatigue, fever, unexpected weight gain and unexpected weight loss.   HENT: Positive for congestion, ear pain and sinus pressure. Negative for nosebleeds, rhinorrhea, trouble swallowing and voice change.    Eyes: Negative for visual disturbance.   Respiratory: Negative for cough, chest tightness, shortness of breath and wheezing.    Cardiovascular: Negative for chest pain, palpitations and leg swelling.   Gastrointestinal: Negative for abdominal pain, blood in stool, constipation, diarrhea, nausea, vomiting, GERD and indigestion.   Genitourinary: Negative for dysuria, frequency and hematuria.   Musculoskeletal: Negative for arthralgias, back pain and myalgias.   Skin: Negative for rash and bruise.   Neurological: Negative for dizziness, tremors, weakness, light-headedness, numbness, headache and memory problem.   Hematological: Negative for adenopathy. Does not bruise/bleed easily.   Psychiatric/Behavioral: Negative for sleep disturbance and depressed mood. The patient is not nervous/anxious.        Objective   There were no vitals taken for this visit.    Physical Exam   Constitutional: No distress.   Pulmonary/Chest: Effort normal.  No respiratory distress.  Neurological: She is alert.   Psychiatric: She has a normal mood and affect. Her behavior is normal. Thought content is normal. She does not express abnormal judgement.       Recent Results (from the past 2016 hour(s))   COMPREHENSIVE METABOLIC PANEL    Collection Time: 01/27/21  3:37 PM    Specimen: Fresh Frozen Plasma   Result Value Ref Range    Sodium 130 (L) 137 - 145 mmol/L    Potassium 4.8 3.5 - 5.1 mmol/L    Chloride 93 (L) 98 - 107 mmol/L    Total CO2 27 22 - 30 mmol/L    Glucose 100 (H) 74 - 99 mg/dL    BUN 32 (H) 7 - 20 mg/dL    Creatinine 0.8 0.7 - 1.5 mg/dL     BUN/Creatinine Ratio 41.0 (H) 10.0 - 20.0 RATIO    Est GFR by Clearance >60 >60 /1.73 m2    Total Protein 7.4 6.3 - 8.2 g/dL    Albumin 4.3 3.5 - 5.0 g/dL    Globulin 3.1 1.5 - 4.5 g/dL    A/G Ratio 1.4 1.1 - 2.5 RATIO    Calcium 10.1 8.4 - 10.2 mg/dL    Total Bilirubin 0.5 0.2 - 1.3 mg/dL    AST (SGOT) 29 15 - 46 U/L    ALT (SGPT) 15 13 - 69 U/L    Alkaline Phosphatase 80 38 - 126 U/L     Assessment/Plan   Diagnoses and all orders for this visit:    1. Acute non-recurrent frontal sinusitis (Primary)  -     amoxicillin (AMOXIL) 500 MG capsule; Take 1 capsule by mouth 3 (Three) Times a Day.  Dispense: 30 capsule; Refill: 0    Discussed with patient and will treat for likely sinus infection with the amoxicillin.  If persistent issues then follow up.  Watch for signs of COVID infection.      Telephone visit completed.    I spent 16 minutes caring for Diana on this date of service. This time includes time spent by me in the following activities:obtaining and/or reviewing a separately obtained history, ordering medications, tests, or procedures and documenting information in the medical record

## 2021-03-17 ENCOUNTER — TELEPHONE (OUTPATIENT)
Dept: CARDIOLOGY | Facility: CLINIC | Age: 81
End: 2021-03-17

## 2021-03-17 NOTE — TELEPHONE ENCOUNTER
Received a message from answering service that patient called with c/o SOA after receiving fentanyl during eye surgery yesterday.  I attempted to call her back and left a VM requesting a return call.     Sujatha Sharp, APRN

## 2021-03-25 ENCOUNTER — TRANSCRIBE ORDERS (OUTPATIENT)
Dept: ADMINISTRATIVE | Facility: HOSPITAL | Age: 81
End: 2021-03-25

## 2021-03-25 DIAGNOSIS — M79.671 RIGHT FOOT PAIN: Primary | ICD-10-CM

## 2021-04-01 ENCOUNTER — HOSPITAL ENCOUNTER (OUTPATIENT)
Dept: GENERAL RADIOLOGY | Facility: HOSPITAL | Age: 81
Discharge: HOME | End: 2021-04-01
Admitting: ORTHOPAEDIC SURGERY

## 2021-04-01 DIAGNOSIS — M79.671 RIGHT FOOT PAIN: ICD-10-CM

## 2021-04-01 PROCEDURE — 25010000002 IOPAMIDOL 61 % SOLUTION: Performed by: ORTHOPAEDIC SURGERY

## 2021-04-01 PROCEDURE — 25010000003 LIDOCAINE 1 % SOLUTION: Performed by: ORTHOPAEDIC SURGERY

## 2021-04-01 PROCEDURE — 25010000002 METHYLPREDNISOLONE PER 125 MG: Performed by: ORTHOPAEDIC SURGERY

## 2021-04-01 PROCEDURE — 77002 NEEDLE LOCALIZATION BY XRAY: CPT

## 2021-04-01 RX ORDER — METHYLPREDNISOLONE SODIUM SUCCINATE 125 MG/2ML
80 INJECTION, POWDER, LYOPHILIZED, FOR SOLUTION INTRAMUSCULAR; INTRAVENOUS
Status: COMPLETED | OUTPATIENT
Start: 2021-04-01 | End: 2021-04-01

## 2021-04-01 RX ORDER — BUPIVACAINE HYDROCHLORIDE 2.5 MG/ML
10 INJECTION, SOLUTION EPIDURAL; INFILTRATION; INTRACAUDAL ONCE
Status: COMPLETED | OUTPATIENT
Start: 2021-04-01 | End: 2021-04-01

## 2021-04-01 RX ORDER — LIDOCAINE HYDROCHLORIDE 10 MG/ML
10 INJECTION, SOLUTION INFILTRATION; PERINEURAL ONCE
Status: COMPLETED | OUTPATIENT
Start: 2021-04-01 | End: 2021-04-01

## 2021-04-01 RX ADMIN — BUPIVACAINE HYDROCHLORIDE 5 ML: 2.5 INJECTION, SOLUTION EPIDURAL; INFILTRATION; INTRACAUDAL; PERINEURAL at 12:00

## 2021-04-01 RX ADMIN — LIDOCAINE HYDROCHLORIDE 1 ML: 10 INJECTION, SOLUTION INFILTRATION; PERINEURAL at 12:00

## 2021-04-01 RX ADMIN — IOPAMIDOL 1 ML: 612 INJECTION, SOLUTION INTRAVENOUS at 12:00

## 2021-04-01 RX ADMIN — METHYLPREDNISOLONE SODIUM SUCCINATE 80 MG: 125 INJECTION, POWDER, LYOPHILIZED, FOR SOLUTION INTRAMUSCULAR; INTRAVENOUS at 12:00

## 2021-04-20 ENCOUNTER — HOSPITAL ENCOUNTER (OUTPATIENT)
Dept: MAMMOGRAPHY | Facility: HOSPITAL | Age: 81
Discharge: HOME | End: 2021-04-20
Admitting: INTERNAL MEDICINE

## 2021-04-20 DIAGNOSIS — Z12.31 VISIT FOR SCREENING MAMMOGRAM: ICD-10-CM

## 2021-04-20 PROCEDURE — 77067 SCR MAMMO BI INCL CAD: CPT

## 2021-04-20 PROCEDURE — 77063 BREAST TOMOSYNTHESIS BI: CPT

## 2021-05-04 ENCOUNTER — TELEPHONE (OUTPATIENT)
Dept: CARDIOLOGY | Facility: CLINIC | Age: 81
End: 2021-05-04

## 2021-05-04 ENCOUNTER — TRANSCRIBE ORDERS (OUTPATIENT)
Dept: PREADMISSION TESTING | Facility: HOSPITAL | Age: 81
End: 2021-05-04

## 2021-05-05 ENCOUNTER — TELEPHONE (OUTPATIENT)
Dept: CARDIOLOGY | Facility: CLINIC | Age: 81
End: 2021-05-05

## 2021-05-05 NOTE — TELEPHONE ENCOUNTER
----- Message from Karrie Ann MA sent at 5/5/2021  1:46 PM EDT -----  Did you get the cardiac risk assessment for this Pt sent to the main office or do you need me to place another call to them. I have seen in her chart the email she sent this morning and your response leads me to believe you have. I just want to confirm

## 2021-05-05 NOTE — TELEPHONE ENCOUNTER
Documented in telephone encounter from 5/4/21, initially started by Sangeeta GAY I have just sent the response back on that one. I am currently waiting for a call back from eileen Rick from [facility] for further information

## 2021-05-05 NOTE — TELEPHONE ENCOUNTER
I'm still waiting to hear if it's under general anesthesia or a block. Did his surgery scheduler get back to you?    RAFAEL

## 2021-05-11 ENCOUNTER — PRE-ADMISSION TESTING (OUTPATIENT)
Dept: PREADMISSION TESTING | Facility: HOSPITAL | Age: 81
End: 2021-05-11

## 2021-05-11 VITALS
BODY MASS INDEX: 31.47 KG/M2 | OXYGEN SATURATION: 99 % | HEIGHT: 62 IN | WEIGHT: 171 LBS | DIASTOLIC BLOOD PRESSURE: 81 MMHG | TEMPERATURE: 97.3 F | RESPIRATION RATE: 20 BRPM | SYSTOLIC BLOOD PRESSURE: 151 MMHG | HEART RATE: 63 BPM

## 2021-05-11 LAB
ALBUMIN SERPL-MCNC: 4.1 G/DL (ref 3.5–5.2)
ALBUMIN/GLOB SERPL: 1.5 G/DL
ALP SERPL-CCNC: 74 U/L (ref 39–117)
ALT SERPL W P-5'-P-CCNC: 19 U/L (ref 1–33)
ANION GAP SERPL CALCULATED.3IONS-SCNC: 8.2 MMOL/L (ref 5–15)
AST SERPL-CCNC: 25 U/L (ref 1–32)
BASOPHILS # BLD AUTO: 0.05 10*3/MM3 (ref 0–0.2)
BASOPHILS NFR BLD AUTO: 0.9 % (ref 0–1.5)
BILIRUB SERPL-MCNC: 0.4 MG/DL (ref 0–1.2)
BUN SERPL-MCNC: 18 MG/DL (ref 8–23)
BUN/CREAT SERPL: 25 (ref 7–25)
CALCIUM SPEC-SCNC: 9.4 MG/DL (ref 8.6–10.5)
CHLORIDE SERPL-SCNC: 93 MMOL/L (ref 98–107)
CO2 SERPL-SCNC: 29.8 MMOL/L (ref 22–29)
CREAT SERPL-MCNC: 0.72 MG/DL (ref 0.57–1)
DEPRECATED RDW RBC AUTO: 41.6 FL (ref 37–54)
EOSINOPHIL # BLD AUTO: 0.21 10*3/MM3 (ref 0–0.4)
EOSINOPHIL NFR BLD AUTO: 3.7 % (ref 0.3–6.2)
ERYTHROCYTE [DISTWIDTH] IN BLOOD BY AUTOMATED COUNT: 12.8 % (ref 12.3–15.4)
GFR SERPL CREATININE-BSD FRML MDRD: 78 ML/MIN/1.73
GLOBULIN UR ELPH-MCNC: 2.7 GM/DL
GLUCOSE SERPL-MCNC: 82 MG/DL (ref 65–99)
HCT VFR BLD AUTO: 36 % (ref 34–46.6)
HGB BLD-MCNC: 12.2 G/DL (ref 12–15.9)
IMM GRANULOCYTES # BLD AUTO: 0.01 10*3/MM3 (ref 0–0.05)
IMM GRANULOCYTES NFR BLD AUTO: 0.2 % (ref 0–0.5)
LYMPHOCYTES # BLD AUTO: 1.51 10*3/MM3 (ref 0.7–3.1)
LYMPHOCYTES NFR BLD AUTO: 26.9 % (ref 19.6–45.3)
MCH RBC QN AUTO: 30 PG (ref 26.6–33)
MCHC RBC AUTO-ENTMCNC: 33.9 G/DL (ref 31.5–35.7)
MCV RBC AUTO: 88.7 FL (ref 79–97)
MONOCYTES # BLD AUTO: 0.55 10*3/MM3 (ref 0.1–0.9)
MONOCYTES NFR BLD AUTO: 9.8 % (ref 5–12)
NEUTROPHILS NFR BLD AUTO: 3.29 10*3/MM3 (ref 1.7–7)
NEUTROPHILS NFR BLD AUTO: 58.5 % (ref 42.7–76)
NRBC BLD AUTO-RTO: 0 /100 WBC (ref 0–0.2)
PLATELET # BLD AUTO: 294 10*3/MM3 (ref 140–450)
PMV BLD AUTO: 9.3 FL (ref 6–12)
POTASSIUM SERPL-SCNC: 3.6 MMOL/L (ref 3.5–5.2)
PROT SERPL-MCNC: 6.8 G/DL (ref 6–8.5)
QT INTERVAL: 441 MS
RBC # BLD AUTO: 4.06 10*6/MM3 (ref 3.77–5.28)
SODIUM SERPL-SCNC: 131 MMOL/L (ref 136–145)
WBC # BLD AUTO: 5.62 10*3/MM3 (ref 3.4–10.8)

## 2021-05-11 PROCEDURE — 36415 COLL VENOUS BLD VENIPUNCTURE: CPT

## 2021-05-11 PROCEDURE — 85025 COMPLETE CBC W/AUTO DIFF WBC: CPT

## 2021-05-11 PROCEDURE — 93005 ELECTROCARDIOGRAM TRACING: CPT

## 2021-05-11 PROCEDURE — 80053 COMPREHEN METABOLIC PANEL: CPT

## 2021-05-11 PROCEDURE — 93010 ELECTROCARDIOGRAM REPORT: CPT | Performed by: INTERNAL MEDICINE

## 2021-05-11 RX ORDER — MULTIPLE VITAMINS W/ MINERALS TAB 9MG-400MCG
1 TAB ORAL DAILY
COMMUNITY

## 2021-05-11 RX ORDER — FEXOFENADINE HCL 180 MG/1
180 TABLET ORAL DAILY
COMMUNITY

## 2021-05-11 NOTE — DISCHARGE INSTRUCTIONS
Take the following medications the morning of surgery:  FAMOTIDINE    ARRIVE TO OUTPATIENT SURGERY CENTER 5/17/21 5:30AM    If you are on prescription narcotic pain medication to control your pain you may also take that medication the morning of surgery.    General Instructions:  • Do not eat solid food after midnight the night before surgery.  • You may drink clear liquids day of surgery but must stop at least one hour before your hospital arrival time.  • It is beneficial for you to have a clear drink that contains carbohydrates the day of surgery.  We suggest a 12 to 20 ounce bottle of Gatorade or Powerade for non-diabetic patients or a 12 to 20 ounce bottle of G2 or Powerade Zero for diabetic patients. (Pediatric patients, are not advised to drink a 12 to 20 ounce carbohydrate drink)    Clear liquids are liquids you can see through.  Nothing red in color.     Plain water                               Sports drinks  Sodas                                   Gelatin (Jell-O)  Fruit juices without pulp such as white grape juice and apple juice  Popsicles that contain no fruit or yogurt  Tea or coffee (no cream or milk added)  Gatorade / Powerade  G2 / Powerade Zero    • Infants may have breast milk up to four hours before surgery.  • Infants drinking formula may drink formula up to six hours before surgery.   • Patients who avoid smoking, chewing tobacco and alcohol for 4 weeks prior to surgery have a reduced risk of post-operative complications.  Quit smoking as many days before surgery as you can.  • Do not smoke, use chewing tobacco or drink alcohol the day of surgery.   • If applicable bring your C-PAP/ BI-PAP machine.  • Bring any papers given to you in the doctor’s office.  • Wear clean comfortable clothes.  • Do not wear contact lenses, false eyelashes or make-up.  Bring a case for your glasses.   • Bring crutches or walker if applicable.  • Remove all piercings.  Leave jewelry and any other valuables at  home.  • Hair extensions with metal clips must be removed prior to surgery.  • The Pre-Admission Testing nurse will instruct you to bring medications if unable to obtain an accurate list in Pre-Admission Testing.        Preventing a Surgical Site Infection:  • For 2 to 3 days before surgery, avoid shaving with a razor because the razor can irritate skin and make it easier to develop an infection.    • Any areas of open skin can increase the risk of a post-operative wound infection by allowing bacteria to enter and travel throughout the body.  Notify your surgeon if you have any skin wounds / rashes even if it is not near the expected surgical site.  The area will need assessed to determine if surgery should be delayed until it is healed.  • The night prior to surgery shower using a fresh bar of anti-bacterial soap (such as Dial) and clean washcloth.  Sleep in a clean bed with clean clothing.  Do not allow pets to sleep with you.  • Shower on the morning of surgery using a fresh bar of anti-bacterial soap (such as Dial) and clean washcloth.  Dry with a clean towel and dress in clean clothing.  • Ask your surgeon if you will be receiving antibiotics prior to surgery.  • Make sure you, your family, and all healthcare providers clean their hands with soap and water or an alcohol based hand  before caring for you or your wound.    Day of surgery:  Your arrival time is approximately two hours before your scheduled surgery time.  Upon arrival, a Pre-op nurse and Anesthesiologist will review your health history, obtain vital signs, and answer questions you may have.  The only belongings needed at this time will be a list of your home medications and if applicable your C-PAP/BI-PAP machine.  A Pre-op nurse will start an IV and you may receive medication in preparation for surgery, including something to help you relax.     Please be aware that surgery does come with discomfort.  We want to make every effort to  control your discomfort so please discuss any uncontrolled symptoms with your nurse.   Your doctor will most likely have prescribed pain medications.      If you are going home after surgery you will receive individualized written care instructions before being discharged.  A responsible adult must drive you to and from the hospital on the day of your surgery and stay with you for 24 hours.  Discharge prescriptions can be filled by the hospital pharmacy during regular pharmacy hours.  If you are having surgery late in the day/evening your prescription may be e-prescribed to your pharmacy.  Please verify your pharmacy hours or chose a 24 hour pharmacy to avoid not having access to your prescription because your pharmacy has closed for the day.    If you are staying overnight following surgery, you will be transported to your hospital room following the recovery period.  [facility] has all private rooms.    If you have any questions please call Pre-Admission Testing at [phone].  Deductibles and co-payments are collected on the day of service. Please be prepared to pay the required co-pay, deductible or deposit on the day of service as defined by your plan.    Patient Education for Self-Quarantine Process    Following your COVID testing, we strongly recommend that you do not leave your home after you have been tested for COVID except to get medical care. This includes not going to work, school or to public areas.  If this is not possible for you to do please limit your activities to only required outings.  Be sure to wear a mask when you are with other people, practice social distancing and wash your hands frequently.      The following items provide additional details to keep you safe.  • Wash your hands with soap and water frequently for at least 20 seconds.   • Avoid touching your eyes, nose and mouth with unwashed hands.  • Do not share anything - utensils, towels, food from the same bowl.    • Have your own utensils, drinking glass, dishes, towels and bedding.   • Do not have visitors.   • Do use FaceTime to stay in touch with family and friends.  • You should stay in a specific room away from others if possible.   • Stay at least 6 feet away from others in the home if you cannot have a dedicated room to yourself.   • Do not snuggle with your pet. While the CDC says there is no evidence that pets can spread COVID-19 or be infected from humans, it is probably best to avoid “petting, snuggling, being kissed or licked and sharing food (during self-quarantine)”, according to the CDC.   • Sanitize household surfaces daily. Include all high touch areas (door handles, light switches, phones, countertops, etc.)  • Do not share a bathroom with others, if possible.   • Wear a mask around others in your home if you are unable to stay in a separate room or 6 feet apart. If  you are unable to wear a mask, have your family member wear a mask if they must be within 6 feet of you.   Call your surgeon immediately if you experience any of the following symptoms:  • Sore Throat  • Shortness of Breath or difficulty breathing  • Cough  • Chills  • Body soreness or muscle pain  • Headache  • Fever  • New loss of taste or smell  • Do not arrive for your surgery ill.  Your procedure will need to be rescheduled to another time.  You will need to call your physician before the day of surgery to avoid any unnecessary exposure to hospital staff as well as other patients.

## 2021-05-13 ENCOUNTER — OFFICE VISIT (OUTPATIENT)
Dept: CARDIOLOGY | Facility: CLINIC | Age: 81
End: 2021-05-13

## 2021-05-13 ENCOUNTER — HOSPITAL ENCOUNTER (OUTPATIENT)
Dept: GENERAL RADIOLOGY | Facility: HOSPITAL | Age: 81
Discharge: HOME | End: 2021-05-13
Admitting: NURSE PRACTITIONER

## 2021-05-13 VITALS
WEIGHT: 172.4 LBS | HEIGHT: 62 IN | DIASTOLIC BLOOD PRESSURE: 90 MMHG | HEART RATE: 50 BPM | SYSTOLIC BLOOD PRESSURE: 170 MMHG | BODY MASS INDEX: 31.73 KG/M2

## 2021-05-13 DIAGNOSIS — I10 ESSENTIAL HYPERTENSION: ICD-10-CM

## 2021-05-13 DIAGNOSIS — Z01.810 ENCOUNTER FOR PRE-OPERATIVE CARDIOVASCULAR CLEARANCE: Primary | ICD-10-CM

## 2021-05-13 DIAGNOSIS — Z01.810 ENCOUNTER FOR PRE-OPERATIVE CARDIOVASCULAR CLEARANCE: ICD-10-CM

## 2021-05-13 DIAGNOSIS — R06.02 SHORTNESS OF BREATH: ICD-10-CM

## 2021-05-13 DIAGNOSIS — R01.1 HEART MURMUR: ICD-10-CM

## 2021-05-13 DIAGNOSIS — U07.1 COVID-19 DETERMINED BY CLINICAL DIAGNOSTIC CRITERIA: ICD-10-CM

## 2021-05-13 DIAGNOSIS — I25.10 CORONARY ARTERY DISEASE INVOLVING NATIVE CORONARY ARTERY OF NATIVE HEART WITHOUT ANGINA PECTORIS: ICD-10-CM

## 2021-05-13 DIAGNOSIS — I49.3 PVCS (PREMATURE VENTRICULAR CONTRACTIONS): ICD-10-CM

## 2021-05-13 DIAGNOSIS — R00.1 BRADYCARDIA, SINUS: ICD-10-CM

## 2021-05-13 PROCEDURE — 99214 OFFICE O/P EST MOD 30 MIN: CPT | Performed by: NURSE PRACTITIONER

## 2021-05-13 PROCEDURE — 71046 X-RAY EXAM CHEST 2 VIEWS: CPT

## 2021-05-13 PROCEDURE — 93000 ELECTROCARDIOGRAM COMPLETE: CPT | Performed by: NURSE PRACTITIONER

## 2021-05-13 NOTE — PROGRESS NOTES
Date of Office Visit: 2021  Encounter Provider: YOHANNES Green  Place of Service: [facility] CARDIOLOGY  Patient Name: Diana Bianchi  :1940  Primary Cardiologist: Dr. Vincenzo Hudson     Chief Complaint   Patient presents with   • Shortness of Breath   • Pre-op Exam   :     HPI: Diana Bianchi is a pleasant 80 y.o. female who presents today for perioperative cardiac risk assessment. She is a new patient to me and I reviewed her medical records.    In , she suffered a myocardial infarction while leaving in [place] and this was treated with TPA. In  and in  she had a cardiac catheterization which did not require any type of intervention. She has had a longstanding history of hypertension.    When she moved to [place] she establish care with Dr. Vincenzo Hudson. In , Dr. Hudson noted abnormal EKG with lateral T wave inversions and Myoview stress test was normal. In , she was noted to have sinus bradycardia and Holter monitor showed good heart rate variability with an average rate of 61 bpm and a 20% burden of monomorphic PVCs. Treadmill stress test was normal and she walked for 9 minutes on Omer protocol with a good heart rate response.    In 2020, she was seen by Dr. Vincenzo Hudson in the office and was doing well at that time.    Over the past couple of months she has sent numerous Voddlerhart messages to Dr. Vincenzo Hudson. Most recently in May she sent a MyChart message stating that she has been more short of breath since she had the COVID-19 virus in 2021. She was curious why she had not had an echocardiogram. She has noticed that her heart rates are in the 40s at home and she is having more indigestion and depression. She also needed to have perioperative cardiac risk assessment for a foot surgery by Dr. Lyle Rm which is scheduled on 2021 and she will be undergoing general anesthesia. Dr. Hudson recommended that she be evaluated in the  office.    I reviewed her recent blood work from May 2021 which showed normal CBC and CMP normal except for sodium of 131, chloride 93, and CO2 29.8.    Today is her follow-up visit.  She reports that she had COVID-19 in January 2021 and continues to experience shortness of breath.  She denies chest pain, PND, orthopnea, cough, or wheezing.  She reports lower extremity edema at times and dizziness/nausea from her sinuses and ears.  She denies any palpitations.  She recently had cataract surgery and was told that her heart rate dropped in the 40s and this concerned her with her upcoming surgery.  Her blood pressure is elevated today and she thinks is just from walking in.    Past Medical History:   Diagnosis Date   • ASCVD (arteriosclerotic cardiovascular disease)     MI 1991, treated in [place] with tPA.  Cath in 2005 in [place], report unavailable, but no stenting was done.   • Blood clot in vein     RIGHT FOOT   • Claustrophobia    • Concussion    • Depression    • Endometrial cancer (CMS/HCC)    • GERD (gastroesophageal reflux disease)    • Hypertension    • Limited mobility     RIGHT FOOT/RIGHT ANKLE   • MI (myocardial infarction) (CMS/HCC)    • MICHAELLE (obstructive sleep apnea)    • PONV (postoperative nausea and vomiting)    • PVCs (premature ventricular contractions) 9/17/2020   • Right foot pain    • Seasonal allergies        Past Surgical History:   Procedure Laterality Date   • CATARACT EXTRACTION WITH INTRAOCULAR LENS IMPLANT Bilateral    • COLONOSCOPY     • ENDOSCOPY     • FOOT SURGERY Left     METAL AND PINS IN LEFT FOOT   • HYSTERECTOMY     • KNEE ARTHROPLASTY Bilateral    • KNEE SURGERY     • LUMBAR FUSION      3-4,4-5   • OOPHORECTOMY Bilateral    • TONSILLECTOMY         Social History     Socioeconomic History   • Marital status: Single     Spouse name: Not on file   • Number of children: Not on file   • Years of education: Not on file   • Highest education level: Not on file   Tobacco Use   •  Smoking status: Never Smoker   • Smokeless tobacco: Never Used   • Tobacco comment: caffeine use/ 3 CUPS DAILY    Vaping Use   • Vaping Use: Never used   Substance and Sexual Activity   • Alcohol use: No   • Drug use: No   • Sexual activity: Defer       Family History   Problem Relation Age of Onset   • Heart disease Mother    • Heart disease Father    • Bone cancer Sister    • Breast cancer Sister    • Heart disease Brother    • Stroke Paternal Grandfather    • Hypertension Other    • Malig Hyperthermia Neg Hx        The following portion of the patient's history were reviewed and updated as appropriate: past medical history, past surgical history, past social history, past family history, allergies, current medications, and problem list.    Review of Systems   Constitutional: Negative.   Cardiovascular: Positive for dyspnea on exertion.   Respiratory: Positive for shortness of breath.    Hematologic/Lymphatic: Negative.    Gastrointestinal: Positive for nausea.   Neurological: Positive for dizziness.       Allergies   Allergen Reactions   • Codeine Anaphylaxis   • Fentanyl Shortness Of Breath   • Hydrocodone-Acetaminophen Shortness Of Breath   • Morphine And Related Anaphylaxis   • Oxycodone-Acetaminophen Shortness Of Breath   • Peanut Oil Shortness Of Breath   • Zithromax [Azithromycin] Anaphylaxis   • Statins Other (See Comments)         • Prednisone Unknown - Low Severity         Current Outpatient Medications:   •  aMILoride-hydrochlorothiazide (MODURETIC) 5-50 MG per tablet, Take 1 tablet by mouth Daily., Disp: 90 tablet, Rfl: 3  •  famotidine (Pepcid) 20 MG tablet, Take 1 tablet by mouth 2 (Two) Times a Day. (Patient taking differently: Take 20 mg by mouth Daily.), Disp: 60 tablet, Rfl: 2  •  fexofenadine (ALLEGRA) 180 MG tablet, Take 180 mg by mouth Daily., Disp: , Rfl:   •  fluticasone (FLONASE) 50 MCG/ACT nasal spray, 2 sprays into the nostril(s) as directed by provider Daily. Administer 2 sprays in each  "nostril for each dose. (Patient taking differently: 2 sprays into the nostril(s) as directed by provider 2 (two) times a day. A), Disp: 1 bottle, Rfl: 11  •  folic acid (FOLVITE) 400 MCG tablet, Take 400 mcg by mouth Every Night., Disp: , Rfl:   •  losartan (COZAAR) 50 MG tablet, Take 1 tablet by mouth Daily., Disp: 90 tablet, Rfl: 3  •  aspirin 81 MG tablet, Take 81 mg by mouth Daily. HOLDING FOR SURGERY, Disp: , Rfl:   •  Calcium-Magnesium-Vitamin D (CALCIUM MAGNESIUM PO), Take 1 tablet by mouth Every Night., Disp: , Rfl:   •  Coenzyme Q10 (CO Q 10 PO), Take  by mouth Daily. HOLD FOR SURGERY, Disp: , Rfl:   •  multivitamin with minerals (MULTIVITAMIN ADULT PO), Take 1 tablet by mouth Daily. HOLD FOR SURGERY, Disp: , Rfl:   •  Omega 3 1000 MG capsule, Take 1,000 mg by mouth Every Night. HOLD FOR SURGERY, Disp: , Rfl:         Objective:     Vitals:    05/13/21 1133   BP: 170/90   BP Location: Left arm   Pulse: 50   Weight: 78.2 kg (172 lb 6.4 oz)   Height: 157.5 cm (62\")     Body mass index is 31.53 kg/m².    PHYSICAL EXAM:    Vitals Reviewed.   General Appearance: No acute distress, well developed and well nourished.    Eyes: Conjunctiva and lids: No erythema, swelling, or discharge. Sclera non-icteric.   HENT: Atraumatic, normocephalic. External eyes, ears, and nose normal. No hearing loss noted. Mucous membranes normal. Lips not cyanotic. Neck supple with no tenderness.  Respiratory: No signs of respiratory distress. Respiration rhythm and depth normal.   Clear to auscultation. No rales, crackles, rhonchi, or wheezing auscultated.   Cardiovascular:  Jugular Venous Pressure: Normal  Heart Rate and Rhythm: Normal rhythm.  Bradycardic.  Ectopy noted.  Heart Sounds: Normal S1 and S2. No S3 or S4 noted.  Murmurs: RUSB grade 2/6 murmur present. No rubs, thrills, or gallops.   Arterial Pulses: Carotid pulses normal. No carotid bruit noted. Posterior tibialis and dorsalis pedis pulses normal.   Lower Extremities: " Bilateral trace lower extremity edema noted.  Gastrointestinal:  Abdomen soft, non-distended, non-tender. Normal bowel sounds.    Musculoskeletal: Normal movement of extremities  Skin and Nails: General appearance normal. No pallor, cyanosis, diaphoresis. Skin temperature normal. No clubbing of fingernails.   Psychiatric: Patient alert and oriented to person, place, and time. Speech and behavior appropriate. Normal mood and affect.       ECG 12 Lead    Date/Time: 5/13/2021 11:37 AM  Performed by: Esther Valdivia APRN  Authorized by: Esther Valdivia APRN   Comparison: compared with previous ECG from 5/11/2021  Similar to previous ECG  Rhythm: sinus bradycardia  Ectopy: unifocal PVCs  Rate: bradycardic  BPM: 50  Conduction: conduction normal  ST Segments: ST segments normal  T Waves: T waves normal  QRS axis: normal  Other: no other findings    Clinical impression: abnormal EKG              Assessment:       Diagnosis Plan   1. Encounter for pre-operative cardiovascular clearance  Adult Transthoracic Echo Complete W/ Cont if Necessary Per Protocol    XR Chest PA & Lateral   2. Shortness of breath  Adult Transthoracic Echo Complete W/ Cont if Necessary Per Protocol    XR Chest PA & Lateral   3. Heart murmur  Adult Transthoracic Echo Complete W/ Cont if Necessary Per Protocol   4. PVCs (premature ventricular contractions)     5. Bradycardia, sinus     6. Coronary artery disease involving native coronary artery of native heart without angina pectoris     7. Essential hypertension     8. COVID-19 determined by clinical diagnostic criteria            Plan:       1/2.  Perioperative Cardiac Risk Assessment: She is scheduled for right foot surgery with general anesthesia on 5/17 by Dr. Lyle Rm.  She reports new onset shortness of breath since January 2021.  I recommended a PA and lateral chest x-ray and echocardiogram to be completed.  She says if the testing cannot be arranged by Monday, she will postpone her surgery  because she is very concerned about the shortness of breath.    3.  Heart Murmur: I have ordered the echocardiogram to be completed.    4.  PVCs: Noted on EKG and she is asymptomatic.  18% PVC burden on Holter monitor in January 2021.  She is not on beta-blocker or calcium channel blocker therapy due to low resting heart rate.    5.  Sinus Bradycardia: Asymptomatic.    6.  Coronary Artery Disease: In 1991, she suffered a myocardial infarction treated with TPA.  She denies anginal symptoms.    7.  Hypertension: Blood pressure was quite elevated today and says this is unusual for her.  She will continue to monitor.    8.  COVID-19: She had the virus in January 2021.    9.  Further recommendations to follow after review the chest x-ray and echocardiogram.    ADDENDUM 5/26/2021:    Echocardiogram Interpretation Summary     · Calculated left ventricular EF = 63.4% Estimated left ventricular EF was in agreement with the calculated left ventricular EF. Left ventricular systolic function is normal. Normal left ventricular cavity size noted. Left ventricular wall thickness is consistent with mild concentric hypertrophy. All left ventricular wall segments contract normally. Left ventricular diastolic function is consistent with (grade I) impaired relaxation.  · The left atrial cavity is moderately dilated.  · The aortic valve is abnormal in structure. There is moderate calcification of the aortic valve. The aortic valve appears trileaflet. Trace to mild aortic valve regurgitation is present. No hemodynamically significant aortic valve stenosis is present.         · Patient informed & verbalizes understanding. She continues to have dyspnea on exertion.   · Patient is considered at acceptable risk for surgery from a cardiovascular standpoint. According to the Marcus's Revised Cardiac Risk Index, patient is considered low risk (0.9%) of adverse cardiovascular events occurring with moderate risk surgery.       As always, it has been  a pleasure to participate in your patient's care. Thank you.       Sincerely,       YOHANNES Garcia  [facility] Cardiology      · COVID-19 Precautions - Patient was compliant in wearing a mask. When I saw the patient, I used appropriate personal protective equipment (PPE) including mask and eye shield (standard procedure).  Additionally, I used gown and gloves if indicated.  Hand hygiene was completed before and after seeing the patient.  · Dictated utilizing Dragon Dictation

## 2021-05-14 ENCOUNTER — TRANSCRIBE ORDERS (OUTPATIENT)
Dept: ADMINISTRATIVE | Facility: HOSPITAL | Age: 81
End: 2021-05-14

## 2021-05-14 ENCOUNTER — APPOINTMENT (OUTPATIENT)
Dept: LAB | Facility: HOSPITAL | Age: 81
End: 2021-05-14

## 2021-05-14 ENCOUNTER — TELEPHONE (OUTPATIENT)
Dept: CARDIOLOGY | Facility: CLINIC | Age: 81
End: 2021-05-14

## 2021-05-14 PROBLEM — I77.819 AORTECTASIA: Status: ACTIVE | Noted: 2021-05-13

## 2021-05-14 NOTE — TELEPHONE ENCOUNTER
Notified patient of results. She verbalized understanding.    Brigette Luciano, RN  Triage [facility]

## 2021-05-14 NOTE — TELEPHONE ENCOUNTER
----- Message from YOHANNES Diallo sent at 5/14/2021 10:21 AM EDT -----  Please call patient.  Lungs are clear.  She has mild scoliosis in her back.  The aorta has some mild calcium in the blood flow is a little bit tortuous.  We will just monitor.

## 2021-05-25 ENCOUNTER — HOSPITAL ENCOUNTER (OUTPATIENT)
Dept: CARDIOLOGY | Facility: HOSPITAL | Age: 81
Discharge: HOME | End: 2021-05-25
Admitting: NURSE PRACTITIONER

## 2021-05-25 VITALS
WEIGHT: 172.4 LBS | HEART RATE: 49 BPM | BODY MASS INDEX: 31.73 KG/M2 | DIASTOLIC BLOOD PRESSURE: 82 MMHG | SYSTOLIC BLOOD PRESSURE: 150 MMHG | HEIGHT: 62 IN

## 2021-05-25 DIAGNOSIS — R01.1 HEART MURMUR: ICD-10-CM

## 2021-05-25 DIAGNOSIS — R06.02 SHORTNESS OF BREATH: ICD-10-CM

## 2021-05-25 DIAGNOSIS — Z01.810 ENCOUNTER FOR PRE-OPERATIVE CARDIOVASCULAR CLEARANCE: ICD-10-CM

## 2021-05-25 PROCEDURE — 93306 TTE W/DOPPLER COMPLETE: CPT | Performed by: INTERNAL MEDICINE

## 2021-05-25 PROCEDURE — 93306 TTE W/DOPPLER COMPLETE: CPT

## 2021-05-26 ENCOUNTER — TELEPHONE (OUTPATIENT)
Dept: CARDIOLOGY | Facility: CLINIC | Age: 81
End: 2021-05-26

## 2021-05-26 PROBLEM — I35.1 NONRHEUMATIC AORTIC VALVE INSUFFICIENCY: Status: ACTIVE | Noted: 2021-05-26

## 2021-05-26 PROBLEM — I70.0 AORTIC CALCIFICATION (HCC): Status: ACTIVE | Noted: 2021-05-26

## 2021-05-26 LAB
AORTIC ARCH: 2.4 CM
BH CV ECHO MEAS - ACS: 1.8 CM
BH CV ECHO MEAS - AI DEC SLOPE: 98.6 CM/SEC^2
BH CV ECHO MEAS - AI MAX PG: 48.2 MMHG
BH CV ECHO MEAS - AI MAX VEL: 347 CM/SEC
BH CV ECHO MEAS - AI P1/2T: 1031 MSEC
BH CV ECHO MEAS - AO MAX PG (FULL): 3.8 MMHG
BH CV ECHO MEAS - AO MAX PG: 9.2 MMHG
BH CV ECHO MEAS - AO MEAN PG (FULL): 3 MMHG
BH CV ECHO MEAS - AO MEAN PG: 5 MMHG
BH CV ECHO MEAS - AO ROOT AREA (BSA CORRECTED): 1.8
BH CV ECHO MEAS - AO ROOT AREA: 8 CM^2
BH CV ECHO MEAS - AO ROOT DIAM: 3.2 CM
BH CV ECHO MEAS - AO V2 MAX: 152 CM/SEC
BH CV ECHO MEAS - AO V2 MEAN: 103 CM/SEC
BH CV ECHO MEAS - AO V2 VTI: 37 CM
BH CV ECHO MEAS - AVA(I,A): 2.5 CM^2
BH CV ECHO MEAS - AVA(I,D): 2.5 CM^2
BH CV ECHO MEAS - AVA(V,A): 2.7 CM^2
BH CV ECHO MEAS - AVA(V,D): 2.7 CM^2
BH CV ECHO MEAS - BSA(HAYCOCK): 1.9 M^2
BH CV ECHO MEAS - BSA: 1.8 M^2
BH CV ECHO MEAS - BZI_BMI: 31.5 KILOGRAMS/M^2
BH CV ECHO MEAS - BZI_METRIC_HEIGHT: 157.5 CM
BH CV ECHO MEAS - BZI_METRIC_WEIGHT: 78 KG
BH CV ECHO MEAS - EDV(MOD-SP2): 84 ML
BH CV ECHO MEAS - EDV(MOD-SP4): 96 ML
BH CV ECHO MEAS - EDV(TEICH): 135.3 ML
BH CV ECHO MEAS - EF(CUBED): 68.7 %
BH CV ECHO MEAS - EF(MOD-BP): 63.4 %
BH CV ECHO MEAS - EF(MOD-SP2): 61.9 %
BH CV ECHO MEAS - EF(MOD-SP4): 64.6 %
BH CV ECHO MEAS - EF(TEICH): 59.8 %
BH CV ECHO MEAS - ESV(MOD-SP2): 32 ML
BH CV ECHO MEAS - ESV(MOD-SP4): 34 ML
BH CV ECHO MEAS - ESV(TEICH): 54.4 ML
BH CV ECHO MEAS - FS: 32.1 %
BH CV ECHO MEAS - IVS/LVPW: 0.8
BH CV ECHO MEAS - IVSD: 1.2 CM
BH CV ECHO MEAS - LAT PEAK E' VEL: 7.5 CM/SEC
BH CV ECHO MEAS - LV DIASTOLIC VOL/BSA (35-75): 53.5 ML/M^2
BH CV ECHO MEAS - LV MASS(C)D: 302.7 GRAMS
BH CV ECHO MEAS - LV MASS(C)DI: 168.8 GRAMS/M^2
BH CV ECHO MEAS - LV MAX PG: 5.5 MMHG
BH CV ECHO MEAS - LV MEAN PG: 2 MMHG
BH CV ECHO MEAS - LV SYSTOLIC VOL/BSA (12-30): 19 ML/M^2
BH CV ECHO MEAS - LV V1 MAX: 117 CM/SEC
BH CV ECHO MEAS - LV V1 MEAN: 72.7 CM/SEC
BH CV ECHO MEAS - LV V1 VTI: 26.2 CM
BH CV ECHO MEAS - LVIDD: 5.3 CM
BH CV ECHO MEAS - LVIDS: 3.6 CM
BH CV ECHO MEAS - LVLD AP2: 7.9 CM
BH CV ECHO MEAS - LVLD AP4: 7.6 CM
BH CV ECHO MEAS - LVLS AP2: 6.6 CM
BH CV ECHO MEAS - LVLS AP4: 6.6 CM
BH CV ECHO MEAS - LVOT AREA (M): 3.5 CM^2
BH CV ECHO MEAS - LVOT AREA: 3.5 CM^2
BH CV ECHO MEAS - LVOT DIAM: 2.1 CM
BH CV ECHO MEAS - LVPWD: 1.5 CM
BH CV ECHO MEAS - MED PEAK E' VEL: 5 CM/SEC
BH CV ECHO MEAS - MV A DUR: 0.14 SEC
BH CV ECHO MEAS - MV A MAX VEL: 99.4 CM/SEC
BH CV ECHO MEAS - MV DEC SLOPE: 454 CM/SEC^2
BH CV ECHO MEAS - MV DEC TIME: 0.28 SEC
BH CV ECHO MEAS - MV E MAX VEL: 54.8 CM/SEC
BH CV ECHO MEAS - MV E/A: 0.55
BH CV ECHO MEAS - MV MAX PG: 4.2 MMHG
BH CV ECHO MEAS - MV MEAN PG: 1 MMHG
BH CV ECHO MEAS - MV P1/2T MAX VEL: 85.5 CM/SEC
BH CV ECHO MEAS - MV P1/2T: 55.2 MSEC
BH CV ECHO MEAS - MV V2 MAX: 102 CM/SEC
BH CV ECHO MEAS - MV V2 MEAN: 52.8 CM/SEC
BH CV ECHO MEAS - MV V2 VTI: 43.1 CM
BH CV ECHO MEAS - MVA P1/2T LCG: 2.6 CM^2
BH CV ECHO MEAS - MVA(P1/2T): 4 CM^2
BH CV ECHO MEAS - MVA(VTI): 2.1 CM^2
BH CV ECHO MEAS - PA ACC TIME: 0.13 SEC
BH CV ECHO MEAS - PA MAX PG (FULL): 0.89 MMHG
BH CV ECHO MEAS - PA MAX PG: 3.3 MMHG
BH CV ECHO MEAS - PA PR(ACCEL): 21.9 MMHG
BH CV ECHO MEAS - PA V2 MAX: 90.9 CM/SEC
BH CV ECHO MEAS - PULM A REVS DUR: 0.19 SEC
BH CV ECHO MEAS - PULM A REVS VEL: 32.1 CM/SEC
BH CV ECHO MEAS - PULM DIAS VEL: 35.1 CM/SEC
BH CV ECHO MEAS - PULM S/D: 1.4
BH CV ECHO MEAS - PULM SYS VEL: 48 CM/SEC
BH CV ECHO MEAS - RAP SYSTOLE: 3 MMHG
BH CV ECHO MEAS - RV MAX PG: 2.4 MMHG
BH CV ECHO MEAS - RV MEAN PG: 1 MMHG
BH CV ECHO MEAS - RV V1 MAX: 77.7 CM/SEC
BH CV ECHO MEAS - RV V1 MEAN: 49 CM/SEC
BH CV ECHO MEAS - RV V1 VTI: 15.7 CM
BH CV ECHO MEAS - RVSP: 23 MMHG
BH CV ECHO MEAS - SI(AO): 166 ML/M^2
BH CV ECHO MEAS - SI(CUBED): 57 ML/M^2
BH CV ECHO MEAS - SI(LVOT): 50.6 ML/M^2
BH CV ECHO MEAS - SI(MOD-SP2): 29 ML/M^2
BH CV ECHO MEAS - SI(MOD-SP4): 34.6 ML/M^2
BH CV ECHO MEAS - SI(TEICH): 45.1 ML/M^2
BH CV ECHO MEAS - SUP REN AO DIAM: 2.1 CM
BH CV ECHO MEAS - SV(AO): 297.6 ML
BH CV ECHO MEAS - SV(CUBED): 102.2 ML
BH CV ECHO MEAS - SV(LVOT): 90.7 ML
BH CV ECHO MEAS - SV(MOD-SP2): 52 ML
BH CV ECHO MEAS - SV(MOD-SP4): 62 ML
BH CV ECHO MEAS - SV(TEICH): 80.9 ML
BH CV ECHO MEAS - TAPSE (>1.6): 2.2 CM
BH CV ECHO MEAS - TR MAX VEL: 224 CM/SEC
BH CV ECHO MEASUREMENTS AVERAGE E/E' RATIO: 8.77
BH CV XLRA - RV BASE: 2.7 CM
BH CV XLRA - RV LENGTH: 6.7 CM
BH CV XLRA - RV MID: 2.1 CM
BH CV XLRA - TDI S': 11.4 CM/SEC
LEFT ATRIUM VOLUME INDEX: 40 ML/M2
MAXIMAL PREDICTED HEART RATE: 142 BPM
SINUS: 3.1 CM
STJ: 2.9 CM
STRESS TARGET HR: 121 BPM

## 2021-05-26 NOTE — TELEPHONE ENCOUNTER
Interpretation Summary    · Calculated left ventricular EF = 63.4% Estimated left ventricular EF was in agreement with the calculated left ventricular EF. Left ventricular systolic function is normal. Normal left ventricular cavity size noted. Left ventricular wall thickness is consistent with mild concentric hypertrophy. All left ventricular wall segments contract normally. Left ventricular diastolic function is consistent with (grade I) impaired relaxation.  · The left atrial cavity is moderately dilated.  · The aortic valve is abnormal in structure. There is moderate calcification of the aortic valve. The aortic valve appears trileaflet. Trace to mild aortic valve regurgitation is present. No hemodynamically significant aortic valve stenosis is present.       Patient informed & verbalizes understanding. She continues to have dyspnea on exertion.     Patient is considered at acceptable risk for surgery from a cardiovascular standpoint. According to the Marcus's Revised Cardiac Risk Index, patient is considered low risk (0.9%) of adverse cardiovascular events occurring with moderate risk surgery.     Lynette - please fax surgery letter to...  Fax [phone] Attention: Lynette Art

## 2021-06-04 ENCOUNTER — TELEPHONE (OUTPATIENT)
Dept: CARDIOLOGY | Facility: CLINIC | Age: 81
End: 2021-06-04

## 2021-06-04 NOTE — TELEPHONE ENCOUNTER
----- Message from Linh Yap MA sent at 6/3/2021  4:58 PM EDT -----  Regarding: FW: Non-Urgent Medical Question  Contact: [phone]    ----- Message -----  From: Diana Bianchi  Sent: 6/2/2021   4:25 PM EDT  To: Angie [facility]  Subject: Non-Urgent Medical Question                      Have you turned my care over to Esther Valdivia? Everything I receive from [facility] re: test etc has her name as referring. That is not satisfactory to me. I was surprised for her to call and give me results of my echcardogram? I had questions which did not ask because a PA and new person to me. Examine for surgery was ok due to the off schedule but test results on my heart not good.  If new practice I need to be aware . At 81 and heart issues want a doctor as I appear to have more problems since Dec such as shortness of breathe ect. Now told leakage of calcium and gonna watch, how? what caused and why. Lots of new questions.  Right now foot surgery schedule June 28th and down for 6 wks.

## 2021-06-04 NOTE — TELEPHONE ENCOUNTER
I called but I got voicemail.  I left a message that I don't like to use My Chart for issues like this.  I did explain that Esther is my personal nurse practitioner and will sometimes have to see my patients in my stead.  If I am rounding in the hospital, or off, and the patient needs to be seen urgently, as it appeared that she did need to be as she has upcoming surgery, then I am completely comfortable having Esther see her.  I explained that her echo findings are within normal limits for age.  I would expect to see some aortic calcification.  The good news is that the valve works well.  I did explain that if she would like to see different cardiologist I will respect that decision but otherwise we are perfectly happy to provide her cardiac care here.

## 2021-06-08 ENCOUNTER — PATIENT MESSAGE (OUTPATIENT)
Dept: FAMILY MEDICINE CLINIC | Facility: CLINIC | Age: 81
End: 2021-06-08

## 2021-06-08 DIAGNOSIS — K21.9 GASTROESOPHAGEAL REFLUX DISEASE WITHOUT ESOPHAGITIS: ICD-10-CM

## 2021-06-08 RX ORDER — FAMOTIDINE 20 MG/1
20 TABLET, FILM COATED ORAL 2 TIMES DAILY
Qty: 180 TABLET | Refills: 2 | Status: SHIPPED | OUTPATIENT
Start: 2021-06-08

## 2021-06-08 NOTE — TELEPHONE ENCOUNTER
From: Diana Bianchi  To: Manoj Nunes MD  Sent: 6/8/2021 9:23 AM EDT  Subject: Prescription Question    Dr. Nunes, I need a refill for famotidine. I do not have an appointment to see you till later this year so would you please give more than 1 month supply for this medication as I take 2x day and you PC just gives 60 pills. Its just for my acid reflux. You use to prescribe for 1 year or 180 pills x 3. as this is not convenient for me or pharmacy to keep calling for script. thank you. So please go back to your 180 pills x 3. also for insurance purposed. thank you Diana Bianchi 1940

## 2021-06-15 ENCOUNTER — TRANSCRIBE ORDERS (OUTPATIENT)
Dept: PREADMISSION TESTING | Facility: HOSPITAL | Age: 81
End: 2021-06-15

## 2021-06-21 ENCOUNTER — PRE-ADMISSION TESTING (OUTPATIENT)
Dept: PREADMISSION TESTING | Facility: HOSPITAL | Age: 81
End: 2021-06-21

## 2021-06-21 VITALS
DIASTOLIC BLOOD PRESSURE: 88 MMHG | OXYGEN SATURATION: 97 % | WEIGHT: 171.9 LBS | RESPIRATION RATE: 16 BRPM | BODY MASS INDEX: 31.63 KG/M2 | HEART RATE: 52 BPM | HEIGHT: 62 IN | SYSTOLIC BLOOD PRESSURE: 156 MMHG | TEMPERATURE: 98.3 F

## 2021-06-21 LAB
ALBUMIN SERPL-MCNC: 4.3 G/DL (ref 3.5–5.2)
ALBUMIN/GLOB SERPL: 1.7 G/DL
ALP SERPL-CCNC: 85 U/L (ref 39–117)
ALT SERPL W P-5'-P-CCNC: 18 U/L (ref 1–33)
ANION GAP SERPL CALCULATED.3IONS-SCNC: 7.7 MMOL/L (ref 5–15)
AST SERPL-CCNC: 23 U/L (ref 1–32)
BASOPHILS # BLD AUTO: 0.07 10*3/MM3 (ref 0–0.2)
BASOPHILS NFR BLD AUTO: 1.2 % (ref 0–1.5)
BILIRUB SERPL-MCNC: 0.4 MG/DL (ref 0–1.2)
BUN SERPL-MCNC: 22 MG/DL (ref 8–23)
BUN/CREAT SERPL: 27.5 (ref 7–25)
CALCIUM SPEC-SCNC: 10.2 MG/DL (ref 8.6–10.5)
CHLORIDE SERPL-SCNC: 93 MMOL/L (ref 98–107)
CO2 SERPL-SCNC: 28.3 MMOL/L (ref 22–29)
CREAT SERPL-MCNC: 0.8 MG/DL (ref 0.57–1)
DEPRECATED RDW RBC AUTO: 45.2 FL (ref 37–54)
EOSINOPHIL # BLD AUTO: 0.19 10*3/MM3 (ref 0–0.4)
EOSINOPHIL NFR BLD AUTO: 3.3 % (ref 0.3–6.2)
ERYTHROCYTE [DISTWIDTH] IN BLOOD BY AUTOMATED COUNT: 13.6 % (ref 12.3–15.4)
GFR SERPL CREATININE-BSD FRML MDRD: 69 ML/MIN/1.73
GLOBULIN UR ELPH-MCNC: 2.6 GM/DL
GLUCOSE SERPL-MCNC: 94 MG/DL (ref 65–99)
HCT VFR BLD AUTO: 35.2 % (ref 34–46.6)
HGB BLD-MCNC: 11.6 G/DL (ref 12–15.9)
IMM GRANULOCYTES # BLD AUTO: 0.02 10*3/MM3 (ref 0–0.05)
IMM GRANULOCYTES NFR BLD AUTO: 0.4 % (ref 0–0.5)
LYMPHOCYTES # BLD AUTO: 1.03 10*3/MM3 (ref 0.7–3.1)
LYMPHOCYTES NFR BLD AUTO: 18.1 % (ref 19.6–45.3)
MCH RBC QN AUTO: 29.8 PG (ref 26.6–33)
MCHC RBC AUTO-ENTMCNC: 33 G/DL (ref 31.5–35.7)
MCV RBC AUTO: 90.5 FL (ref 79–97)
MONOCYTES # BLD AUTO: 0.5 10*3/MM3 (ref 0.1–0.9)
MONOCYTES NFR BLD AUTO: 8.8 % (ref 5–12)
NEUTROPHILS NFR BLD AUTO: 3.87 10*3/MM3 (ref 1.7–7)
NEUTROPHILS NFR BLD AUTO: 68.2 % (ref 42.7–76)
NRBC BLD AUTO-RTO: 0 /100 WBC (ref 0–0.2)
PLATELET # BLD AUTO: 334 10*3/MM3 (ref 140–450)
PMV BLD AUTO: 9.4 FL (ref 6–12)
POTASSIUM SERPL-SCNC: 4.1 MMOL/L (ref 3.5–5.2)
PROT SERPL-MCNC: 6.9 G/DL (ref 6–8.5)
RBC # BLD AUTO: 3.89 10*6/MM3 (ref 3.77–5.28)
SODIUM SERPL-SCNC: 129 MMOL/L (ref 136–145)
WBC # BLD AUTO: 5.68 10*3/MM3 (ref 3.4–10.8)

## 2021-06-21 PROCEDURE — 36415 COLL VENOUS BLD VENIPUNCTURE: CPT

## 2021-06-21 PROCEDURE — 80053 COMPREHEN METABOLIC PANEL: CPT

## 2021-06-21 PROCEDURE — 85025 COMPLETE CBC W/AUTO DIFF WBC: CPT

## 2021-06-21 RX ORDER — ACETAMINOPHEN 500 MG
500 TABLET ORAL EVERY 6 HOURS PRN
COMMUNITY

## 2021-06-21 NOTE — DISCHARGE INSTRUCTIONS
Take the following medications the morning of surgery:  PEPCID    ARRIVAL TIME : HOSPITAL WILL CALL PRIOR TO SURGERY DAY WITH ARRIVAL TIME      General Instructions:  • Do not eat solid food after midnight the night before surgery.  • You may drink clear liquids day of surgery but must stop at least one hour before your hospital arrival time.  • It is beneficial for you to have a clear drink that contains carbohydrates the day of surgery.  We suggest a 12 to 20 ounce bottle of Gatorade or Powerade for non-diabetic patients or a 12 to 20 ounce bottle of G2 or Powerade Zero for diabetic patients. (Pediatric patients, are not advised to drink a 12 to 20 ounce carbohydrate drink)    Clear liquids are liquids you can see through.  Nothing red in color.     Plain water                               Sports drinks  Sodas                                   Gelatin (Jell-O)  Fruit juices without pulp such as white grape juice and apple juice  Popsicles that contain no fruit or yogurt  Tea or coffee (no cream or milk added)  Gatorade / Powerade  G2 / Powerade Zero    • Bring any papers given to you in the doctor’s office.  • Wear clean comfortable clothes.  • Do not wear contact lenses, false eyelashes or make-up.  Bring a case for your glasses.   • Remove all piercings.  Leave jewelry and any other valuables at home.  • Hair extensions with metal clips must be removed prior to surgery.  • The Pre-Admission Testing nurse will instruct you to bring medications if unable to obtain an accurate list in Pre-Admission Testing.        Preventing a Surgical Site Infection:  • For 2 to 3 days before surgery, avoid shaving with a razor because the razor can irritate skin and make it easier to develop an infection.    • Any areas of open skin can increase the risk of a post-operative wound infection by allowing bacteria to enter and travel throughout the body.  Notify your surgeon if you have any skin wounds / rashes even if it is not  near the expected surgical site.  The area will need assessed to determine if surgery should be delayed until it is healed.  • The night prior to surgery shower using a fresh bar of anti-bacterial soap (such as Dial) and clean washcloth.  Sleep in a clean bed with clean clothing.  Do not allow pets to sleep with you.  • Shower on the morning of surgery using a fresh bar of anti-bacterial soap (such as Dial) and clean washcloth.  Dry with a clean towel and dress in clean clothing.  • Ask your surgeon if you will be receiving antibiotics prior to surgery.  • Make sure you, your family, and all healthcare providers clean their hands with soap and water or an alcohol based hand  before caring for you or your wound.    Day of surgery:  Your arrival time is approximately two hours before your scheduled surgery time.  Upon arrival, a Pre-op nurse and Anesthesiologist will review your health history, obtain vital signs, and answer questions you may have.  The only belongings needed at this time will be a list of your home medications and if applicable your C-PAP/BI-PAP machine.  A Pre-op nurse will start an IV and you may receive medication in preparation for surgery, including something to help you relax.     Please be aware that surgery does come with discomfort.  We want to make every effort to control your discomfort so please discuss any uncontrolled symptoms with your nurse.   Your doctor will most likely have prescribed pain medications.      If you are going home after surgery you will receive individualized written care instructions before being discharged.  A responsible adult must drive you to and from the hospital on the day of your surgery and stay with you for 24 hours.  Discharge prescriptions can be filled by the hospital pharmacy during regular pharmacy hours.  If you are having surgery late in the day/evening your prescription may be e-prescribed to your pharmacy.  Please verify your pharmacy  hours or chose a 24 hour pharmacy to avoid not having access to your prescription because your pharmacy has closed for the day.    If you are staying overnight following surgery, you will be transported to your hospital room following the recovery period.  [facility] has all private rooms.    If you have any questions please call Pre-Admission Testing at [phone].  Deductibles and co-payments are collected on the day of service. Please be prepared to pay the required co-pay, deductible or deposit on the day of service as defined by your plan.    Patient Education for Self-Quarantine Process    Following your COVID testing, we strongly recommend that you do not leave your home after you have been tested for COVID except to get medical care. This includes not going to work, school or to public areas.  If this is not possible for you to do please limit your activities to only required outings.  Be sure to wear a mask when you are with other people, practice social distancing and wash your hands frequently.      The following items provide additional details to keep you safe.  • Wash your hands with soap and water frequently for at least 20 seconds.   • Avoid touching your eyes, nose and mouth with unwashed hands.  • Do not share anything - utensils, towels, food from the same bowl.   • Have your own utensils, drinking glass, dishes, towels and bedding.   • Do not have visitors.   • Do use FaceTime to stay in touch with family and friends.  • You should stay in a specific room away from others if possible.   • Stay at least 6 feet away from others in the home if you cannot have a dedicated room to yourself.   • Do not snuggle with your pet. While the CDC says there is no evidence that pets can spread COVID-19 or be infected from humans, it is probably best to avoid “petting, snuggling, being kissed or licked and sharing food (during self-quarantine)”, according to the CDC.   • Sanitize household  surfaces daily. Include all high touch areas (door handles, light switches, phones, countertops, etc.)  • Do not share a bathroom with others, if possible.   • Wear a mask around others in your home if you are unable to stay in a separate room or 6 feet apart. If  you are unable to wear a mask, have your family member wear a mask if they must be within 6 feet of you.   Call your surgeon immediately if you experience any of the following symptoms:  • Sore Throat  • Shortness of Breath or difficulty breathing  • Cough  • Chills  • Body soreness or muscle pain  • Headache  • Fever  • New loss of taste or smell  • Do not arrive for your surgery ill.  Your procedure will need to be rescheduled to another time.  You will need to call your physician before the day of surgery to avoid any unnecessary exposure to hospital staff as well as other patients.

## 2021-06-25 ENCOUNTER — LAB (OUTPATIENT)
Dept: LAB | Facility: HOSPITAL | Age: 81
End: 2021-06-25

## 2021-06-25 LAB — SARS-COV-2 ORF1AB RESP QL NAA+PROBE: NOT DETECTED

## 2021-06-25 PROCEDURE — U0004 COV-19 TEST NON-CDC HGH THRU: HCPCS

## 2021-06-25 PROCEDURE — C9803 HOPD COVID-19 SPEC COLLECT: HCPCS

## 2021-06-28 ENCOUNTER — APPOINTMENT (OUTPATIENT)
Dept: GENERAL RADIOLOGY | Facility: HOSPITAL | Age: 81
End: 2021-06-28

## 2021-06-28 ENCOUNTER — ANESTHESIA (OUTPATIENT)
Dept: PERIOP | Facility: HOSPITAL | Age: 81
End: 2021-06-28

## 2021-06-28 ENCOUNTER — HOSPITAL ENCOUNTER (OUTPATIENT)
Facility: HOSPITAL | Age: 81
Discharge: HOME | End: 2021-06-29
Attending: ORTHOPAEDIC SURGERY | Admitting: ORTHOPAEDIC SURGERY

## 2021-06-28 ENCOUNTER — ANESTHESIA EVENT (OUTPATIENT)
Dept: PERIOP | Facility: HOSPITAL | Age: 81
End: 2021-06-28

## 2021-06-28 DIAGNOSIS — M19.079 ARTHRITIS OF MIDFOOT: Primary | ICD-10-CM

## 2021-06-28 PROCEDURE — G0378 HOSPITAL OBSERVATION PER HR: HCPCS

## 2021-06-28 PROCEDURE — C1713 ANCHOR/SCREW BN/BN,TIS/BN: HCPCS | Performed by: ORTHOPAEDIC SURGERY

## 2021-06-28 PROCEDURE — 73630 X-RAY EXAM OF FOOT: CPT

## 2021-06-28 PROCEDURE — 25010000002 MIDAZOLAM PER 1 MG: Performed by: ANESTHESIOLOGY

## 2021-06-28 PROCEDURE — 25010000002 ROPIVACAINE PER 1 MG: Performed by: ANESTHESIOLOGY

## 2021-06-28 PROCEDURE — 76942 ECHO GUIDE FOR BIOPSY: CPT | Performed by: ORTHOPAEDIC SURGERY

## 2021-06-28 PROCEDURE — 25010000002 VANCOMYCIN 1 G RECONSTITUTED SOLUTION: Performed by: ORTHOPAEDIC SURGERY

## 2021-06-28 PROCEDURE — C1769 GUIDE WIRE: HCPCS | Performed by: ORTHOPAEDIC SURGERY

## 2021-06-28 PROCEDURE — 25010000002 PROPOFOL 10 MG/ML EMULSION: Performed by: NURSE ANESTHETIST, CERTIFIED REGISTERED

## 2021-06-28 PROCEDURE — 25010000003 CEFAZOLIN IN DEXTROSE 2-4 GM/100ML-% SOLUTION: Performed by: NURSE PRACTITIONER

## 2021-06-28 PROCEDURE — 25010000002 HYDRALAZINE PER 20 MG: Performed by: NURSE ANESTHETIST, CERTIFIED REGISTERED

## 2021-06-28 PROCEDURE — 25010000002 FENTANYL CITRATE (PF) 50 MCG/ML SOLUTION: Performed by: NURSE ANESTHETIST, CERTIFIED REGISTERED

## 2021-06-28 PROCEDURE — 25010000002 ONDANSETRON PER 1 MG: Performed by: NURSE ANESTHETIST, CERTIFIED REGISTERED

## 2021-06-28 PROCEDURE — 25010000003 CEFAZOLIN IN DEXTROSE 2-4 GM/100ML-% SOLUTION: Performed by: ORTHOPAEDIC SURGERY

## 2021-06-28 PROCEDURE — 76000 FLUOROSCOPY <1 HR PHYS/QHP: CPT

## 2021-06-28 DEVICE — IMPLANTABLE DEVICE: Type: IMPLANTABLE DEVICE | Site: ANKLE | Status: FUNCTIONAL

## 2021-06-28 DEVICE — ALLOGRFT FIBR OSTEOAMP SELECT 5CC: Type: IMPLANTABLE DEVICE | Site: ANKLE | Status: FUNCTIONAL

## 2021-06-28 DEVICE — SCREW, HEADLESS, 4.5 X 50MM
Type: IMPLANTABLE DEVICE | Site: ANKLE | Status: FUNCTIONAL
Brand: MEDLINE UNITE

## 2021-06-28 DEVICE — INJECTABLE KIT
Type: IMPLANTABLE DEVICE | Site: ANKLE | Status: FUNCTIONAL
Brand: AUGMENT® INJECTABLE

## 2021-06-28 DEVICE — DYNACLIP BONE FIXATION SYSTEM, 20MM X 18MM X 18MM
Type: IMPLANTABLE DEVICE | Site: ANKLE | Status: FUNCTIONAL
Brand: DYNACLIP

## 2021-06-28 DEVICE — SCREW, HEADED, 7.0 X 85 X 16MM
Type: IMPLANTABLE DEVICE | Site: ANKLE | Status: FUNCTIONAL
Brand: MEDLINE UNITE

## 2021-06-28 DEVICE — DYNACLIP BONE FIXATION SYSTEM, 20MM X 22MM X 22MM
Type: IMPLANTABLE DEVICE | Site: ANKLE | Status: FUNCTIONAL
Brand: DYNACLIP

## 2021-06-28 RX ORDER — VANCOMYCIN HYDROCHLORIDE 1 G/20ML
INJECTION, POWDER, LYOPHILIZED, FOR SOLUTION INTRAVENOUS AS NEEDED
Status: DISCONTINUED | OUTPATIENT
Start: 2021-06-28 | End: 2021-06-28 | Stop reason: HOSPADM

## 2021-06-28 RX ORDER — ASPIRIN 325 MG
325 TABLET, DELAYED RELEASE (ENTERIC COATED) ORAL DAILY
Status: DISCONTINUED | OUTPATIENT
Start: 2021-06-29 | End: 2021-06-29 | Stop reason: HOSPADM

## 2021-06-28 RX ORDER — DEXMEDETOMIDINE HYDROCHLORIDE 100 UG/ML
INJECTION, SOLUTION INTRAVENOUS AS NEEDED
Status: DISCONTINUED | OUTPATIENT
Start: 2021-06-28 | End: 2021-06-28 | Stop reason: SURG

## 2021-06-28 RX ORDER — EPHEDRINE SULFATE 50 MG/ML
INJECTION, SOLUTION INTRAVENOUS AS NEEDED
Status: DISCONTINUED | OUTPATIENT
Start: 2021-06-28 | End: 2021-06-28 | Stop reason: SURG

## 2021-06-28 RX ORDER — ROPIVACAINE HYDROCHLORIDE 5 MG/ML
INJECTION, SOLUTION EPIDURAL; INFILTRATION; PERINEURAL
Status: COMPLETED | OUTPATIENT
Start: 2021-06-28 | End: 2021-06-28

## 2021-06-28 RX ORDER — NALOXONE HCL 0.4 MG/ML
0.4 VIAL (ML) INJECTION
Status: DISCONTINUED | OUTPATIENT
Start: 2021-06-28 | End: 2021-06-28 | Stop reason: ALTCHOICE

## 2021-06-28 RX ORDER — TRAMADOL HYDROCHLORIDE 50 MG/1
100 TABLET ORAL EVERY 4 HOURS PRN
Status: DISCONTINUED | OUTPATIENT
Start: 2021-06-28 | End: 2021-06-29 | Stop reason: HOSPADM

## 2021-06-28 RX ORDER — LABETALOL HYDROCHLORIDE 5 MG/ML
5 INJECTION, SOLUTION INTRAVENOUS
Status: DISCONTINUED | OUTPATIENT
Start: 2021-06-28 | End: 2021-06-28 | Stop reason: HOSPADM

## 2021-06-28 RX ORDER — GLYCOPYRROLATE 0.2 MG/ML
INJECTION INTRAMUSCULAR; INTRAVENOUS AS NEEDED
Status: DISCONTINUED | OUTPATIENT
Start: 2021-06-28 | End: 2021-06-28 | Stop reason: SURG

## 2021-06-28 RX ORDER — DIPHENHYDRAMINE HCL 25 MG
25 CAPSULE ORAL EVERY 6 HOURS PRN
Status: DISCONTINUED | OUTPATIENT
Start: 2021-06-28 | End: 2021-06-29 | Stop reason: HOSPADM

## 2021-06-28 RX ORDER — IBUPROFEN 600 MG/1
600 TABLET ORAL ONCE AS NEEDED
Status: DISCONTINUED | OUTPATIENT
Start: 2021-06-28 | End: 2021-06-28 | Stop reason: HOSPADM

## 2021-06-28 RX ORDER — FAMOTIDINE 20 MG/1
20 TABLET, FILM COATED ORAL 2 TIMES DAILY
Status: DISCONTINUED | OUTPATIENT
Start: 2021-06-28 | End: 2021-06-29 | Stop reason: HOSPADM

## 2021-06-28 RX ORDER — SODIUM CHLORIDE 0.9 % (FLUSH) 0.9 %
3-10 SYRINGE (ML) INJECTION AS NEEDED
Status: DISCONTINUED | OUTPATIENT
Start: 2021-06-28 | End: 2021-06-28 | Stop reason: HOSPADM

## 2021-06-28 RX ORDER — NALOXONE HCL 0.4 MG/ML
0.2 VIAL (ML) INJECTION AS NEEDED
Status: DISCONTINUED | OUTPATIENT
Start: 2021-06-28 | End: 2021-06-28 | Stop reason: HOSPADM

## 2021-06-28 RX ORDER — FENTANYL CITRATE 50 UG/ML
25 INJECTION, SOLUTION INTRAMUSCULAR; INTRAVENOUS
Status: DISCONTINUED | OUTPATIENT
Start: 2021-06-28 | End: 2021-06-28 | Stop reason: HOSPADM

## 2021-06-28 RX ORDER — ACETAMINOPHEN 325 MG/1
325 TABLET ORAL EVERY 4 HOURS PRN
Status: DISCONTINUED | OUTPATIENT
Start: 2021-06-28 | End: 2021-06-29 | Stop reason: HOSPADM

## 2021-06-28 RX ORDER — MIDAZOLAM HYDROCHLORIDE 1 MG/ML
0.5 INJECTION INTRAMUSCULAR; INTRAVENOUS
Status: COMPLETED | OUTPATIENT
Start: 2021-06-28 | End: 2021-06-28

## 2021-06-28 RX ORDER — ONDANSETRON 2 MG/ML
INJECTION INTRAMUSCULAR; INTRAVENOUS AS NEEDED
Status: DISCONTINUED | OUTPATIENT
Start: 2021-06-28 | End: 2021-06-28 | Stop reason: SURG

## 2021-06-28 RX ORDER — DIPHENHYDRAMINE HYDROCHLORIDE 50 MG/ML
12.5 INJECTION INTRAMUSCULAR; INTRAVENOUS
Status: DISCONTINUED | OUTPATIENT
Start: 2021-06-28 | End: 2021-06-28 | Stop reason: HOSPADM

## 2021-06-28 RX ORDER — FAMOTIDINE 10 MG/ML
20 INJECTION, SOLUTION INTRAVENOUS ONCE
Status: COMPLETED | OUTPATIENT
Start: 2021-06-28 | End: 2021-06-28

## 2021-06-28 RX ORDER — DIPHENHYDRAMINE HCL 25 MG
25 CAPSULE ORAL
Status: DISCONTINUED | OUTPATIENT
Start: 2021-06-28 | End: 2021-06-28 | Stop reason: HOSPADM

## 2021-06-28 RX ORDER — SODIUM CHLORIDE 0.9 % (FLUSH) 0.9 %
3 SYRINGE (ML) INJECTION EVERY 12 HOURS SCHEDULED
Status: DISCONTINUED | OUTPATIENT
Start: 2021-06-28 | End: 2021-06-28 | Stop reason: HOSPADM

## 2021-06-28 RX ORDER — BUPIVACAINE HYDROCHLORIDE 5 MG/ML
INJECTION, SOLUTION PERINEURAL AS NEEDED
Status: DISCONTINUED | OUTPATIENT
Start: 2021-06-28 | End: 2021-06-28 | Stop reason: HOSPADM

## 2021-06-28 RX ORDER — ACETAMINOPHEN 325 MG/1
650 TABLET ORAL ONCE AS NEEDED
Status: DISCONTINUED | OUTPATIENT
Start: 2021-06-28 | End: 2021-06-28 | Stop reason: HOSPADM

## 2021-06-28 RX ORDER — ACETAMINOPHEN 650 MG/1
650 SUPPOSITORY RECTAL ONCE AS NEEDED
Status: DISCONTINUED | OUTPATIENT
Start: 2021-06-28 | End: 2021-06-28 | Stop reason: HOSPADM

## 2021-06-28 RX ORDER — CEFAZOLIN SODIUM 2 G/100ML
2 INJECTION, SOLUTION INTRAVENOUS EVERY 8 HOURS
Status: COMPLETED | OUTPATIENT
Start: 2021-06-28 | End: 2021-06-29

## 2021-06-28 RX ORDER — HYDROMORPHONE HYDROCHLORIDE 1 MG/ML
0.5 INJECTION, SOLUTION INTRAMUSCULAR; INTRAVENOUS; SUBCUTANEOUS
Status: DISCONTINUED | OUTPATIENT
Start: 2021-06-28 | End: 2021-06-28 | Stop reason: ALTCHOICE

## 2021-06-28 RX ORDER — EPHEDRINE SULFATE 50 MG/ML
5 INJECTION, SOLUTION INTRAVENOUS ONCE AS NEEDED
Status: DISCONTINUED | OUTPATIENT
Start: 2021-06-28 | End: 2021-06-28 | Stop reason: HOSPADM

## 2021-06-28 RX ORDER — CETIRIZINE HYDROCHLORIDE 10 MG/1
5 TABLET ORAL DAILY
Status: DISCONTINUED | OUTPATIENT
Start: 2021-06-28 | End: 2021-06-29 | Stop reason: HOSPADM

## 2021-06-28 RX ORDER — ONDANSETRON 2 MG/ML
4 INJECTION INTRAMUSCULAR; INTRAVENOUS ONCE AS NEEDED
Status: DISCONTINUED | OUTPATIENT
Start: 2021-06-28 | End: 2021-06-28 | Stop reason: HOSPADM

## 2021-06-28 RX ORDER — ONDANSETRON 4 MG/1
4 TABLET, FILM COATED ORAL EVERY 6 HOURS PRN
Status: DISCONTINUED | OUTPATIENT
Start: 2021-06-28 | End: 2021-06-29 | Stop reason: HOSPADM

## 2021-06-28 RX ORDER — HYDROCHLOROTHIAZIDE 25 MG/1
50 TABLET ORAL DAILY
Status: DISCONTINUED | OUTPATIENT
Start: 2021-06-28 | End: 2021-06-29 | Stop reason: HOSPADM

## 2021-06-28 RX ORDER — PROMETHAZINE HYDROCHLORIDE 25 MG/1
25 TABLET ORAL ONCE AS NEEDED
Status: DISCONTINUED | OUTPATIENT
Start: 2021-06-28 | End: 2021-06-28 | Stop reason: HOSPADM

## 2021-06-28 RX ORDER — LIDOCAINE HYDROCHLORIDE 10 MG/ML
0.5 INJECTION, SOLUTION EPIDURAL; INFILTRATION; INTRACAUDAL; PERINEURAL ONCE AS NEEDED
Status: DISCONTINUED | OUTPATIENT
Start: 2021-06-28 | End: 2021-06-28 | Stop reason: HOSPADM

## 2021-06-28 RX ORDER — TRAMADOL HYDROCHLORIDE 50 MG/1
50 TABLET ORAL EVERY 4 HOURS PRN
Status: DISCONTINUED | OUTPATIENT
Start: 2021-06-28 | End: 2021-06-29 | Stop reason: HOSPADM

## 2021-06-28 RX ORDER — ESMOLOL HYDROCHLORIDE 10 MG/ML
INJECTION INTRAVENOUS AS NEEDED
Status: DISCONTINUED | OUTPATIENT
Start: 2021-06-28 | End: 2021-06-28 | Stop reason: SURG

## 2021-06-28 RX ORDER — LIDOCAINE HYDROCHLORIDE 20 MG/ML
INJECTION, SOLUTION INFILTRATION; PERINEURAL AS NEEDED
Status: DISCONTINUED | OUTPATIENT
Start: 2021-06-28 | End: 2021-06-28 | Stop reason: SURG

## 2021-06-28 RX ORDER — HYDRALAZINE HYDROCHLORIDE 20 MG/ML
INJECTION INTRAMUSCULAR; INTRAVENOUS AS NEEDED
Status: DISCONTINUED | OUTPATIENT
Start: 2021-06-28 | End: 2021-06-28 | Stop reason: SURG

## 2021-06-28 RX ORDER — FLUTICASONE PROPIONATE 50 MCG
2 SPRAY, SUSPENSION (ML) NASAL DAILY
Status: DISCONTINUED | OUTPATIENT
Start: 2021-06-28 | End: 2021-06-29 | Stop reason: HOSPADM

## 2021-06-28 RX ORDER — FLUMAZENIL 0.1 MG/ML
0.2 INJECTION INTRAVENOUS AS NEEDED
Status: DISCONTINUED | OUTPATIENT
Start: 2021-06-28 | End: 2021-06-28 | Stop reason: HOSPADM

## 2021-06-28 RX ORDER — PROPOFOL 10 MG/ML
VIAL (ML) INTRAVENOUS AS NEEDED
Status: DISCONTINUED | OUTPATIENT
Start: 2021-06-28 | End: 2021-06-28 | Stop reason: SURG

## 2021-06-28 RX ORDER — MAGNESIUM HYDROXIDE 1200 MG/15ML
LIQUID ORAL AS NEEDED
Status: DISCONTINUED | OUTPATIENT
Start: 2021-06-28 | End: 2021-06-28 | Stop reason: HOSPADM

## 2021-06-28 RX ORDER — ONDANSETRON 2 MG/ML
4 INJECTION INTRAMUSCULAR; INTRAVENOUS EVERY 6 HOURS PRN
Status: DISCONTINUED | OUTPATIENT
Start: 2021-06-28 | End: 2021-06-29 | Stop reason: HOSPADM

## 2021-06-28 RX ORDER — FENTANYL CITRATE 50 UG/ML
INJECTION, SOLUTION INTRAMUSCULAR; INTRAVENOUS AS NEEDED
Status: DISCONTINUED | OUTPATIENT
Start: 2021-06-28 | End: 2021-06-28 | Stop reason: SURG

## 2021-06-28 RX ORDER — AMILORIDE HYDROCHLORIDE 5 MG/1
5 TABLET ORAL DAILY
Status: DISCONTINUED | OUTPATIENT
Start: 2021-06-28 | End: 2021-06-29 | Stop reason: HOSPADM

## 2021-06-28 RX ORDER — MULTIPLE VITAMINS W/ MINERALS TAB 9MG-400MCG
1 TAB ORAL DAILY
Status: DISCONTINUED | OUTPATIENT
Start: 2021-06-28 | End: 2021-06-29 | Stop reason: HOSPADM

## 2021-06-28 RX ORDER — CEFAZOLIN SODIUM 2 G/100ML
2 INJECTION, SOLUTION INTRAVENOUS ONCE
Status: COMPLETED | OUTPATIENT
Start: 2021-06-28 | End: 2021-06-28

## 2021-06-28 RX ORDER — LOSARTAN POTASSIUM 50 MG/1
50 TABLET ORAL DAILY
Status: DISCONTINUED | OUTPATIENT
Start: 2021-06-28 | End: 2021-06-29 | Stop reason: HOSPADM

## 2021-06-28 RX ORDER — SODIUM CHLORIDE, SODIUM LACTATE, POTASSIUM CHLORIDE, CALCIUM CHLORIDE 600; 310; 30; 20 MG/100ML; MG/100ML; MG/100ML; MG/100ML
9 INJECTION, SOLUTION INTRAVENOUS CONTINUOUS
Status: DISCONTINUED | OUTPATIENT
Start: 2021-06-28 | End: 2021-06-29 | Stop reason: HOSPADM

## 2021-06-28 RX ORDER — PROMETHAZINE HYDROCHLORIDE 25 MG/1
25 SUPPOSITORY RECTAL ONCE AS NEEDED
Status: DISCONTINUED | OUTPATIENT
Start: 2021-06-28 | End: 2021-06-28 | Stop reason: HOSPADM

## 2021-06-28 RX ORDER — HYDROMORPHONE HYDROCHLORIDE 1 MG/ML
0.5 INJECTION, SOLUTION INTRAMUSCULAR; INTRAVENOUS; SUBCUTANEOUS
Status: DISCONTINUED | OUTPATIENT
Start: 2021-06-28 | End: 2021-06-29 | Stop reason: HOSPADM

## 2021-06-28 RX ORDER — FENTANYL CITRATE 50 UG/ML
50 INJECTION, SOLUTION INTRAMUSCULAR; INTRAVENOUS
Status: DISCONTINUED | OUTPATIENT
Start: 2021-06-28 | End: 2021-06-28 | Stop reason: HOSPADM

## 2021-06-28 RX ADMIN — DEXMEDETOMIDINE 8 MCG: 100 INJECTION, SOLUTION, CONCENTRATE INTRAVENOUS at 11:50

## 2021-06-28 RX ADMIN — FENTANYL CITRATE 50 MCG: 50 INJECTION INTRAMUSCULAR; INTRAVENOUS at 10:25

## 2021-06-28 RX ADMIN — PROPOFOL 140 MCG/KG/MIN: 10 INJECTION, EMULSION INTRAVENOUS at 10:05

## 2021-06-28 RX ADMIN — CEFAZOLIN SODIUM 2 G: 2 INJECTION, SOLUTION INTRAVENOUS at 17:29

## 2021-06-28 RX ADMIN — EPHEDRINE SULFATE 10 MG: 50 INJECTION INTRAVENOUS at 10:54

## 2021-06-28 RX ADMIN — TRAMADOL HYDROCHLORIDE 100 MG: 50 TABLET, FILM COATED ORAL at 17:29

## 2021-06-28 RX ADMIN — CEFAZOLIN SODIUM 2 G: 2 INJECTION, SOLUTION INTRAVENOUS at 10:05

## 2021-06-28 RX ADMIN — PROPOFOL 140 MCG/KG/MIN: 10 INJECTION, EMULSION INTRAVENOUS at 11:42

## 2021-06-28 RX ADMIN — MIDAZOLAM 0.5 MG: 1 INJECTION INTRAMUSCULAR; INTRAVENOUS at 09:11

## 2021-06-28 RX ADMIN — LIDOCAINE HYDROCHLORIDE 80 MG: 20 INJECTION, SOLUTION INFILTRATION; PERINEURAL at 10:01

## 2021-06-28 RX ADMIN — MIDAZOLAM 0.5 MG: 1 INJECTION INTRAMUSCULAR; INTRAVENOUS at 09:01

## 2021-06-28 RX ADMIN — ONDANSETRON 4 MG: 2 INJECTION INTRAMUSCULAR; INTRAVENOUS at 12:12

## 2021-06-28 RX ADMIN — ROPIVACAINE HYDROCHLORIDE 20 ML: 5 INJECTION, SOLUTION EPIDURAL; INFILTRATION; PERINEURAL at 09:19

## 2021-06-28 RX ADMIN — GLYCOPYRROLATE 0.2 MG: 0.2 INJECTION INTRAMUSCULAR; INTRAVENOUS at 10:10

## 2021-06-28 RX ADMIN — SODIUM CHLORIDE, POTASSIUM CHLORIDE, SODIUM LACTATE AND CALCIUM CHLORIDE 9 ML/HR: 600; 310; 30; 20 INJECTION, SOLUTION INTRAVENOUS at 08:52

## 2021-06-28 RX ADMIN — PROPOFOL 140 MCG/KG/MIN: 10 INJECTION, EMULSION INTRAVENOUS at 10:55

## 2021-06-28 RX ADMIN — FENTANYL CITRATE 25 MCG: 50 INJECTION INTRAMUSCULAR; INTRAVENOUS at 12:03

## 2021-06-28 RX ADMIN — PROPOFOL 20 MG: 10 INJECTION, EMULSION INTRAVENOUS at 11:50

## 2021-06-28 RX ADMIN — DEXMEDETOMIDINE 8 MCG: 100 INJECTION, SOLUTION, CONCENTRATE INTRAVENOUS at 10:30

## 2021-06-28 RX ADMIN — PROPOFOL 40 MG: 10 INJECTION, EMULSION INTRAVENOUS at 11:40

## 2021-06-28 RX ADMIN — EPHEDRINE SULFATE 10 MG: 50 INJECTION INTRAVENOUS at 10:39

## 2021-06-28 RX ADMIN — PROPOFOL 40 MG: 10 INJECTION, EMULSION INTRAVENOUS at 10:25

## 2021-06-28 RX ADMIN — FAMOTIDINE 20 MG: 10 INJECTION INTRAVENOUS at 08:52

## 2021-06-28 RX ADMIN — FENTANYL CITRATE 25 MCG: 50 INJECTION INTRAMUSCULAR; INTRAVENOUS at 11:42

## 2021-06-28 RX ADMIN — DEXMEDETOMIDINE 8 MCG: 100 INJECTION, SOLUTION, CONCENTRATE INTRAVENOUS at 11:38

## 2021-06-28 RX ADMIN — PROPOFOL 150 MG: 10 INJECTION, EMULSION INTRAVENOUS at 10:01

## 2021-06-28 RX ADMIN — FAMOTIDINE 20 MG: 20 TABLET, FILM COATED ORAL at 22:09

## 2021-06-28 RX ADMIN — HYDRALAZINE HYDROCHLORIDE 5 MG: 20 INJECTION, SOLUTION INTRAMUSCULAR; INTRAVENOUS at 12:07

## 2021-06-28 RX ADMIN — EPHEDRINE SULFATE 10 MG: 50 INJECTION INTRAVENOUS at 10:18

## 2021-06-28 RX ADMIN — EPHEDRINE SULFATE 10 MG: 50 INJECTION INTRAVENOUS at 12:26

## 2021-06-28 RX ADMIN — ACETAMINOPHEN 325 MG: 325 TABLET, FILM COATED ORAL at 20:15

## 2021-06-28 RX ADMIN — ESMOLOL HYDROCHLORIDE 20 MG: 100 INJECTION, SOLUTION INTRAVENOUS at 11:53

## 2021-06-28 RX ADMIN — ROPIVACAINE HYDROCHLORIDE 30 ML: 5 INJECTION, SOLUTION EPIDURAL; INFILTRATION; PERINEURAL at 09:18

## 2021-06-28 RX ADMIN — PROPOFOL 50 MG: 10 INJECTION, EMULSION INTRAVENOUS at 10:05

## 2021-06-28 RX ADMIN — SODIUM CHLORIDE, POTASSIUM CHLORIDE, SODIUM LACTATE AND CALCIUM CHLORIDE 9 ML/HR: 600; 310; 30; 20 INJECTION, SOLUTION INTRAVENOUS at 12:42

## 2021-06-28 NOTE — ANESTHESIA PROCEDURE NOTES
Peripheral Block      Patient reassessed immediately prior to procedure    Patient location during procedure: holding area  Start time: 6/28/2021 9:00 AM  Stop time: 6/28/2021 9:10 AM  Reason for block: at surgeon's request and post-op pain management  Performed by  Anesthesiologist: Jennifer Khalil MD  Preanesthetic Checklist  Completed: patient identified, IV checked, site marked, risks and benefits discussed, surgical consent, monitors and equipment checked, pre-op evaluation and timeout performed  Prep:  Pt Position: left lateral decubitus  Sterile barriers:gloves, cap and mask  Prep: ChloraPrep  Patient monitoring: blood pressure monitoring, continuous pulse oximetry and EKG  Procedure  Sedation:yes    Guidance:ultrasound guided and difficult to visualize due to body habitus   ULTRASOUND INTERPRETATION. Using ultrasound guidance a 21 G gauge needle was placed in close proximity to the nerve, at which point, under ultrasound guidance anesthetic was injected in the area of the nerve and spread of the anesthesia was seen on ultrasound in close proximity thereto.  There were no abnormalities seen on ultrasound; a digital image was taken; and the patient tolerated the procedure with no complications. Images:still images obtained, printed/placed on chart    Laterality:right  Block Type:popliteal  Injection Technique:single-shot  Needle Type:echogenic  Needle Gauge:21 G  Loss of resistance: Normal     Medications Used: ropivacaine (NAROPIN) 0.5 % injection, 30 mL      Post Assessment  Injection Assessment: negative aspiration for heme, no paresthesia on injection and incremental injection  Patient Tolerance:comfortable throughout block  Complications:no

## 2021-06-28 NOTE — ANESTHESIA POSTPROCEDURE EVALUATION
Patient: Diana Bianchi    Procedure Summary     Date: 06/28/21 Room / Location:  [facility]  /  [facility]    Anesthesia Start: 0957 Anesthesia Stop: 1234    Procedure: RIGHT TRIPLE ARTHRODESIS 1ST TARSOMETATARSAL JOINT ARTHRODESIS ACHILLES LENGTHENING (Right Ankle) Diagnosis:     Surgeons: Lyle Rm Jr., MD Provider: Jennifer Khalil MD    Anesthesia Type: regional, MAC ASA Status: 3          Anesthesia Type: regional, MAC    Vitals  Vitals Value Taken Time   /73 06/28/21 1255   Temp 36.3 °C (97.4 °F) 06/28/21 1300   Pulse 60 06/28/21 1309   Resp 16 06/28/21 1300   SpO2 95 % 06/28/21 1309   Vitals shown include unvalidated device data.        Post Anesthesia Care and Evaluation    Patient location during evaluation: bedside  Patient participation: complete - patient participated  Level of consciousness: awake  Pain management: adequate  Airway patency: patent  Anesthetic complications: No anesthetic complications    Cardiovascular status: acceptable  Respiratory status: acceptable  Hydration status: acceptable    Comments: */66   Pulse 64   Temp 36.3 °C (97.4 °F) (Temporal)   Resp 16   SpO2 97%

## 2021-06-28 NOTE — ANESTHESIA PREPROCEDURE EVALUATION
Anesthesia Evaluation     history of anesthetic complications: PONV  NPO Solid Status: > 8 hours  NPO Liquid Status: > 2 hours           Airway   Mallampati: II  TM distance: >3 FB  Neck ROM: full  Dental          Pulmonary - normal exam   (+) sleep apnea,   Cardiovascular - normal exam  Exercise tolerance: good (4-7 METS)    ECG reviewed    (+) hypertension well controlled 2 medications or greater, valvular problems/murmurs AI, past MI (related to a clot in the90's, no issues since) , CAD, dysrhythmias (bigeminy on stress test +frequent PVCs) PVC,     ROS comment: · Calculated left ventricular EF = 63.4% Estimated left ventricular EF was in agreement with the calculated left ventricular EF. Left ventricular systolic function is normal. Normal left ventricular cavity size noted. Left ventricular wall thickness is consistent with mild concentric hypertrophy. All left ventricular wall segments contract normally. Left ventricular diastolic function is consistent with (grade I) impaired relaxation.  · The left atrial cavity is moderately dilated.  · The aortic valve is abnormal in structure. There is moderate calcification of the aortic valve. The aortic valve appears trileaflet. Trace to mild aortic valve regurgitation is present. No hemodynamically significant aortic valve stenosis is present.    Neuro/Psych  (+) psychiatric history,     GI/Hepatic/Renal/Endo    (+)  GERD,      Musculoskeletal     Abdominal    Substance History      OB/GYN          Other   arthritis,    history of cancer remission                    Anesthesia Plan    ASA 3     regional and MAC   (Discussed multiple narcotic allergies, seems to be a variety of complaints from nausea to difficulty breathing. Will avoid narcotics as much as possible with blocks in place, discussed with patient in the event of block failure she may need Fentanyl. Discussed likelihood that fentanyl contributes to her nausea, but the benefit may at some point exceed the  risk. The patient appears to understand and wishes to proceed. TIVA for PONV.)  intravenous induction     Anesthetic plan, all risks, benefits, and alternatives have been provided, discussed and informed consent has been obtained with: patient.

## 2021-06-28 NOTE — ANESTHESIA PROCEDURE NOTES
Peripheral Block      Patient reassessed immediately prior to procedure    Patient location during procedure: holding area  Start time: 6/28/2021 9:10 AM  Stop time: 6/28/2021 9:16 AM  Reason for block: at surgeon's request and post-op pain management  Performed by  Anesthesiologist: Jennifer Khalil MD  Preanesthetic Checklist  Completed: patient identified, IV checked, site marked, risks and benefits discussed, surgical consent, monitors and equipment checked, pre-op evaluation and timeout performed  Prep:  Pt Position: supine  Sterile barriers:gloves, cap and mask  Prep: ChloraPrep  Patient monitoring: blood pressure monitoring, continuous pulse oximetry and EKG  Procedure  Sedation:yes    Guidance:ultrasound guided  ULTRASOUND INTERPRETATION. Using ultrasound guidance a 21 G gauge needle was placed in close proximity to the nerve, at which point, under ultrasound guidance anesthetic was injected in the area of the nerve and spread of the anesthesia was seen on ultrasound in close proximity thereto.  There were no abnormalities seen on ultrasound; a digital image was taken; and the patient tolerated the procedure with no complications. Images:still images obtained, printed/placed on chart    Laterality:right  Block Type:adductor canal block  Injection Technique:single-shot  Needle Type:echogenic  Needle Gauge:21 G  Loss of resistance: Normal     Medications Used: ropivacaine (NAROPIN) 0.5 % injection, 20 mL      Post Assessment  Injection Assessment: negative aspiration for heme, no paresthesia on injection and incremental injection  Patient Tolerance:comfortable throughout block  Complications:no

## 2021-06-28 NOTE — ANESTHESIA PROCEDURE NOTES
Airway  Urgency: elective    Date/Time: 6/28/2021 10:03 AM    General Information and Staff    Patient location during procedure: OR  Anesthesiologist: Jennifer Khalil MD  CRNA: Petty Zaidi CRNA    Indications and Patient Condition  Indications for airway management: airway protection    Preoxygenated: yes  Mask difficulty assessment: 0 - not attempted    Final Airway Details  Final airway type: supraglottic airway      Successful airway: unique  Size 4    Number of attempts at approach: 1  Assessment: lips, teeth, and gum same as pre-op and atraumatic intubation

## 2021-06-29 ENCOUNTER — READMISSION MANAGEMENT (OUTPATIENT)
Dept: CALL CENTER | Facility: HOSPITAL | Age: 81
End: 2021-06-29

## 2021-06-29 VITALS
RESPIRATION RATE: 16 BRPM | SYSTOLIC BLOOD PRESSURE: 156 MMHG | HEART RATE: 64 BPM | WEIGHT: 171.96 LBS | HEIGHT: 62 IN | TEMPERATURE: 97.9 F | DIASTOLIC BLOOD PRESSURE: 71 MMHG | BODY MASS INDEX: 31.64 KG/M2 | OXYGEN SATURATION: 94 %

## 2021-06-29 PROCEDURE — 25010000003 CEFAZOLIN IN DEXTROSE 2-4 GM/100ML-% SOLUTION: Performed by: NURSE PRACTITIONER

## 2021-06-29 PROCEDURE — 97110 THERAPEUTIC EXERCISES: CPT

## 2021-06-29 PROCEDURE — 63710000001 ONDANSETRON PER 8 MG: Performed by: NURSE PRACTITIONER

## 2021-06-29 PROCEDURE — G0378 HOSPITAL OBSERVATION PER HR: HCPCS

## 2021-06-29 PROCEDURE — 25010000002 ONDANSETRON PER 1 MG: Performed by: NURSE PRACTITIONER

## 2021-06-29 PROCEDURE — 97161 PT EVAL LOW COMPLEX 20 MIN: CPT

## 2021-06-29 RX ORDER — ONDANSETRON 4 MG/1
4 TABLET, FILM COATED ORAL EVERY 6 HOURS PRN
Qty: 20 TABLET | Refills: 0 | Status: SHIPPED | OUTPATIENT
Start: 2021-06-29 | End: 2022-06-02

## 2021-06-29 RX ORDER — TRAMADOL HYDROCHLORIDE 50 MG/1
TABLET ORAL
Qty: 30 TABLET | Refills: 0 | Status: SHIPPED | OUTPATIENT
Start: 2021-06-29 | End: 2022-06-02

## 2021-06-29 RX ORDER — ASPIRIN 325 MG
325 TABLET, DELAYED RELEASE (ENTERIC COATED) ORAL DAILY
Qty: 30 TABLET | Refills: 0 | Status: SHIPPED | OUTPATIENT
Start: 2021-06-29 | End: 2022-06-02

## 2021-06-29 RX ADMIN — AMILORIDE HYDROCLORIDE 5 MG: 5 TABLET ORAL at 09:19

## 2021-06-29 RX ADMIN — TRAMADOL HYDROCHLORIDE 50 MG: 50 TABLET, FILM COATED ORAL at 13:50

## 2021-06-29 RX ADMIN — LOSARTAN POTASSIUM 50 MG: 50 TABLET, FILM COATED ORAL at 09:19

## 2021-06-29 RX ADMIN — ONDANSETRON HYDROCHLORIDE 4 MG: 4 TABLET, FILM COATED ORAL at 11:52

## 2021-06-29 RX ADMIN — CEFAZOLIN SODIUM 2 G: 2 INJECTION, SOLUTION INTRAVENOUS at 09:22

## 2021-06-29 RX ADMIN — TRAMADOL HYDROCHLORIDE 100 MG: 50 TABLET, FILM COATED ORAL at 00:58

## 2021-06-29 RX ADMIN — ONDANSETRON 4 MG: 2 INJECTION INTRAMUSCULAR; INTRAVENOUS at 01:58

## 2021-06-29 RX ADMIN — ASPIRIN 325 MG: 325 TABLET, COATED ORAL at 09:19

## 2021-06-29 RX ADMIN — CEFAZOLIN SODIUM 2 G: 2 INJECTION, SOLUTION INTRAVENOUS at 01:01

## 2021-06-29 RX ADMIN — HYDROCHLOROTHIAZIDE 50 MG: 25 TABLET ORAL at 09:19

## 2021-06-29 NOTE — OUTREACH NOTE
Prep Survey      Responses   [facility] patient discharged from?  [place]   Is LACE score < 7 ?  Yes   Emergency Room discharge w/ pulse ox?  No   Eligibility  [facility]   Date of Admission  06/28/21   Date of Discharge  06/29/21   Discharge Disposition  Home or Self Care   Discharge diagnosis  Right foot triple arthrodesis,   Right first tarsometatarsal joint arthrodesis,   Right Achilles lengthening    Does the patient have one of the following disease processes/diagnoses(primary or secondary)?  General Surgery   Does the patient have Home health ordered?  No   Is there a DME ordered?  No   Prep survey completed?  Yes          Julissa Kirkpatrick RN

## 2021-06-30 ENCOUNTER — TRANSITIONAL CARE MANAGEMENT TELEPHONE ENCOUNTER (OUTPATIENT)
Dept: CALL CENTER | Facility: HOSPITAL | Age: 81
End: 2021-06-30

## 2021-06-30 NOTE — OUTREACH NOTE
Call Center TCM Note      Responses   [facility] patient discharged from?  [place]   Does the patient have one of the following disease processes/diagnoses(primary or secondary)?  General Surgery   TCM attempt successful?  No   Unsuccessful attempts  Attempt 1          Alize Francisco MA    6/30/2021, 14:14 EDT

## 2021-06-30 NOTE — OUTREACH NOTE
Call Center TCM Note      Responses   [facility] patient discharged from?  [place]   Does the patient have one of the following disease processes/diagnoses(primary or secondary)?  General Surgery   TCM attempt successful?  No   Unsuccessful attempts  Attempt 2          Alize Francisco MA    6/30/2021, 16:21 EDT

## 2021-07-01 ENCOUNTER — TRANSITIONAL CARE MANAGEMENT TELEPHONE ENCOUNTER (OUTPATIENT)
Dept: CALL CENTER | Facility: HOSPITAL | Age: 81
End: 2021-07-01

## 2021-07-01 NOTE — OUTREACH NOTE
Call Center TCM Note      Responses   [facility] patient discharged from?  [place]   Does the patient have one of the following disease processes/diagnoses(primary or secondary)?  General Surgery   TCM attempt successful?  No   Unsuccessful attempts  Attempt 3   Wrap up additional comments  Unable to reach pt x 3 attempts for TCM call. Pt is not yet sched for TCM [facility] with PCP Dr Nunes. This visit would need to be completed by 07/13/2021.          Alize Francisco MA    7/1/2021, 15:12 EDT

## 2021-07-19 DIAGNOSIS — J30.2 SEASONAL ALLERGIES: ICD-10-CM

## 2021-07-20 RX ORDER — FLUTICASONE PROPIONATE 50 MCG
2 SPRAY, SUSPENSION (ML) NASAL 2 TIMES DAILY
Qty: 16 G | Refills: 10 | Status: SHIPPED | OUTPATIENT
Start: 2021-07-20

## 2021-08-07 ENCOUNTER — HOSPITAL ENCOUNTER (EMERGENCY)
Facility: HOSPITAL | Age: 81
Discharge: HOME | End: 2021-08-07
Attending: EMERGENCY MEDICINE | Admitting: EMERGENCY MEDICINE

## 2021-08-07 VITALS
HEART RATE: 54 BPM | TEMPERATURE: 96.4 F | SYSTOLIC BLOOD PRESSURE: 148 MMHG | OXYGEN SATURATION: 98 % | RESPIRATION RATE: 16 BRPM | DIASTOLIC BLOOD PRESSURE: 89 MMHG

## 2021-08-07 DIAGNOSIS — Z46.89 ORTHOPEDIC CAST REMOVAL: Primary | ICD-10-CM

## 2021-08-07 PROCEDURE — 99283 EMERGENCY DEPT VISIT LOW MDM: CPT

## 2021-08-07 NOTE — ED TRIAGE NOTES
Pt reports she was referred to the ER by MD Rm for a cast removal. Pt had right foot surgery on June 28th. Pt states the cast fills tight.     Pt was wearing a mask during assessment.  This RN wore appropriate PPE

## 2021-08-07 NOTE — ED PROVIDER NOTES
EMERGENCY DEPARTMENT ENCOUNTER    Room Number:  B02/02  Date of encounter:  8/7/2021  PCP: Manoj Nunes MD  Historian: Patient      HPI:  Chief Complaint: Foot pain  A complete HPI/ROS/PMH/PSH/SH/FH are unobtainable due to: None    Context: Diana Bianchi is a 80 y.o. female who presents to the ED via fire vehicle for cast removal of the cast on her right foot.  Is almost 6 weeks postop right foot surgery with Dr. Rm.  States that she talk to Dr. Rm and they told her to come to ER for cast removal due to pain and swelling.       MEDICAL RECORD REVIEW    Chart review in epic shows op note from June 20, 2021 with Dr. Rm, patient underwent right foot triple arthrodesis with right first tarsometatarsal joint arthrodesis with a separate incision, right Achilles lengthening, separate incision    PAST MEDICAL HISTORY  Active Ambulatory Problems     Diagnosis Date Noted   • Hypertension    • Coronary artery disease involving native coronary artery of native heart without angina pectoris 08/02/2017   • Medicare annual wellness visit, subsequent 08/20/2019   • Seasonal allergies 08/20/2019   • Visit for screening mammogram 08/20/2019   • GERD (gastroesophageal reflux disease) 08/20/2019   • At risk for bone density loss 08/20/2019   • Postmenopausal 08/20/2019   • Bradycardia, sinus 09/17/2020   • PVCs (premature ventricular contractions) 09/17/2020   • COVID-19 determined by clinical diagnostic criteria 01/06/2020   • Arthritis 01/10/2020   • Acute non-recurrent frontal sinusitis 02/18/2021   • Heart murmur 05/13/2021   • Aortectasia (CMS/HCC) 05/13/2021   • Nonrheumatic aortic valve insufficiency 05/26/2021   • Aortic calcification (CMS/HCC) 05/26/2021   • Arthritis of midfoot 06/28/2021     Resolved Ambulatory Problems     Diagnosis Date Noted   • No Resolved Ambulatory Problems     Past Medical History:   Diagnosis Date   • ASCVD (arteriosclerotic cardiovascular disease)    • Blood clot in vein    •  Claustrophobia    • Concussion    • Depression    • Endometrial cancer (CMS/HCC)    • Limited mobility    • MI (myocardial infarction) (CMS/HCC)    • OA (osteoarthritis)    • MICHAELLE (obstructive sleep apnea)    • PONV (postoperative nausea and vomiting)    • Right foot pain          PAST SURGICAL HISTORY  Past Surgical History:   Procedure Laterality Date   • ANKLE FUSION Right 6/28/2021    Procedure: RIGHT TRIPLE ARTHRODESIS 1ST TARSOMETATARSAL JOINT ARTHRODESIS ACHILLES LENGTHENING;  Surgeon: Lyle Rm Jr., MD;  [place] [facility] [place] [facility] [place]  Service: [place]opedics;  Laterality: Right;   • CATARACT EXTRACTION WITH INTRAOCULAR LENS IMPLANT Bilateral    • COLONOSCOPY     • ENDOSCOPY     • FOOT SURGERY Left     METAL AND PINS IN LEFT FOOT   • HYSTERECTOMY     • KNEE ARTHROPLASTY Bilateral    • KNEE SURGERY     • LUMBAR FUSION      3-4,4-5   • OOPHORECTOMY Bilateral    • TONSILLECTOMY           FAMILY HISTORY  Family History   Problem Relation Age of Onset   • Heart disease Mother    • Heart disease Father    • Bone cancer Sister    • Breast cancer Sister    • Heart disease Brother    • Stroke Paternal Grandfather    • Hypertension Other    • Malig Hyperthermia Neg Hx          SOCIAL HISTORY  Social History     Socioeconomic History   • Marital status: Single     Spouse name: Not on file   • Number of children: Not on file   • Years of education: Not on file   • Highest education level: Not on file   Tobacco Use   • Smoking status: Never Smoker   • Smokeless tobacco: Never Used   • Tobacco comment: caffeine use/ 3 CUPS DAILY    Vaping Use   • Vaping Use: Never used   Substance and Sexual Activity   • Alcohol use: No   • Drug use: No   • Sexual activity: Defer         ALLERGIES  Codeine, Fentanyl, Hydrocodone-acetaminophen, Morphine and related, Oxycodone-acetaminophen, Peanut oil, Zithromax [azithromycin], Statins, and Prednisone        REVIEW OF SYSTEMS  Review of Systems     All systems reviewed and negative except for  those discussed in HPI.       PHYSICAL EXAM    I have reviewed the triage vital signs and nursing notes.    ED Triage Vitals [08/07/21 0951]   Temp Heart Rate Resp BP SpO2   96.4 °F (35.8 °C) 54 16 148/89 98 %      Temp src Heart Rate Source Patient Position BP Location FiO2 (%)   Tympanic Monitor -- -- --       Physical Exam  General: Awake, alert, no acute distress  HEENT: EOMI  Pulm: Symmetric chest rise, nonlabored breathing  CV: Regular rate and rhythm  GI: Non-distended  MSK: No deformity  Skin: Warm, dry, no significant cellulitis or purulence.  Surgical sites are well appearing no significant edema, pulses intact  Neuro: Alert and oriented x 3, moving all extremities, no focal deficits  Psych: Calm, cooperative    Vital signs and nursing notes reviewed.       Surgical mask, protective eye goggles, and gloves used during this encounter. Patient in surgical mask.      LAB RESULTS  No results found for this or any previous visit (from the past 24 hour(s)).        RADIOLOGY  No Radiology Exams Resulted Within Past 24 Hours          PROCEDURES    Procedures  Right foot/ankle fiberglass cast removed by myself with cast saw. Verbal consent from patient. No complications.    MEDICATIONS GIVEN IN ER    Medications - No data to display      PROGRESS, DATA ANALYSIS, CONSULTS, AND MEDICAL DECISION MAKING    All labs have been independently reviewed by me.  All radiology studies have been reviewed by me and discussed with radiologist dictating the report.   EKG's independently viewed and interpreted by me.  Discussion below represents my analysis of pertinent findings related to patient's condition, differential diagnosis, treatment plan and final disposition.    Complaining of pain and tightness in the cast, will remove it and discussed with orthopedics.    ED Course as of Aug 07 1333   Sat Aug 07, 2021   1127 Cast removed, ortho consult placed. Likely place into short walking boot    [DC]   1138 Discussed with   [facility], he recommends short walking boot, nonweightbearing, will follow-up in the office this week.    [DC]      ED Course User Index  [DC] Satya Malloy MD       AS OF 13:33 EDT VITALS:    BP - 148/89  HR - 54  TEMP - 96.4 °F (35.8 °C) (Tympanic)  02 SATS - 98%        DIAGNOSIS  Final diagnoses:   Orthopedic cast removal         DISPOSITION  DISCHARGE    Patient discharged in stable condition.    Reviewed implications of results, diagnosis, meds, responsibility to follow up, warning signs and symptoms of possible worsening, potential complications and reasons to return to ER.    Patient/Family voiced understanding of above instructions.    Discussed plan for discharge, as there is no emergent indication for admission. Patient referred to primary care provider for BP management due to today's BP. Pt/family is agreeable and understands need for follow up and repeat testing.  Pt is aware that discharge does not mean that nothing is wrong but it indicates no emergency is present that requires admission and they must continue care with follow-up as given below or physician of their choice.     FOLLOW-UP  [facility] Emergency Department  [address]  [facility] [address] [phone]    As needed, If symptoms worsen    Lyle Rm Jr., MD  [address]  [place] [address]  [phone]    Call on 8/9/2021  to confirm follow up appointment this week         Medication List      No changes were made to your prescriptions during this visit.                    Satya Malloy MD  08/07/21 5204

## 2021-08-07 NOTE — DISCHARGE INSTRUCTIONS
Per Dr. Rm, nonweightbearing on this foot even with the walking boot in place.  Follow-up with him this week as scheduled.  ED return for worsening symptoms as needed.

## 2021-09-08 ENCOUNTER — TELEPHONE (OUTPATIENT)
Dept: CARDIOLOGY | Facility: CLINIC | Age: 81
End: 2021-09-08

## 2021-10-13 RX ORDER — LOSARTAN POTASSIUM 50 MG/1
TABLET ORAL
Qty: 90 TABLET | Refills: 3 | Status: SHIPPED | OUTPATIENT
Start: 2021-10-13 | End: 2022-12-08

## 2021-10-25 ENCOUNTER — TELEPHONE (OUTPATIENT)
Dept: CARDIOLOGY | Facility: CLINIC | Age: 81
End: 2021-10-25

## 2021-10-25 NOTE — TELEPHONE ENCOUNTER
Pt called and reported that her pcp instructed her to stop her Amiloride-HZTC 5/50 mg bid for 2 weeks, due to low sodium.  She did not want to do anything until she discussed with you.  She would go back in 2 wks to have labs redrawn.  Please advise.

## 2021-11-23 ENCOUNTER — TELEPHONE (OUTPATIENT)
Dept: CARDIOLOGY | Facility: CLINIC | Age: 81
End: 2021-11-23

## 2021-11-23 NOTE — TELEPHONE ENCOUNTER
Regarding: amlODIPine Besylate 5mg  hydroCHOROROthiazid 25mg  ----- Message from Linh Yap MA sent at 11/23/2021  4:32 PM EST -----       ----- Message from Diana Bianchi to Vincenzo Hudson MD sent at 11/22/2021 11:09 PM -----   took two days and got real dizzy so I stopped.  Dr. Hudson could you advise me as to w hat to take since off my amoloride?  Swelling in legs and ankles continues and BP elevated.  Been off amoloride for 3 weeks now.  Thank you

## 2021-11-29 RX ORDER — AMILORIDE HYDROCHLORIDE 5 MG/1
5 TABLET ORAL DAILY
Qty: 90 TABLET | Refills: 1 | Status: SHIPPED | OUTPATIENT
Start: 2021-11-29 | End: 2022-05-27

## 2021-11-29 NOTE — TELEPHONE ENCOUNTER
I s/w Dr Patten.    She was previously on amiloride-HCTZ and her Na would always be 125-130. Her previous PCP tolerated this, but her new PCP would like it to be more in the normal range. I recommend she take amiloride only, with goal Na 130-135 (it went up to 139 off all thiazides) and to not take amlodipine.

## 2021-12-01 ENCOUNTER — OFFICE VISIT (OUTPATIENT)
Dept: CARDIOLOGY | Facility: CLINIC | Age: 81
End: 2021-12-01

## 2021-12-01 VITALS
WEIGHT: 171.4 LBS | SYSTOLIC BLOOD PRESSURE: 152 MMHG | HEART RATE: 50 BPM | BODY MASS INDEX: 31.54 KG/M2 | HEIGHT: 62 IN | DIASTOLIC BLOOD PRESSURE: 80 MMHG

## 2021-12-01 DIAGNOSIS — I10 PRIMARY HYPERTENSION: ICD-10-CM

## 2021-12-01 DIAGNOSIS — I70.0 AORTIC CALCIFICATION (HCC): ICD-10-CM

## 2021-12-01 DIAGNOSIS — R00.1 BRADYCARDIA, SINUS: ICD-10-CM

## 2021-12-01 DIAGNOSIS — I25.10 CORONARY ARTERY DISEASE INVOLVING NATIVE CORONARY ARTERY OF NATIVE HEART WITHOUT ANGINA PECTORIS: Primary | ICD-10-CM

## 2021-12-01 DIAGNOSIS — I49.3 PVCS (PREMATURE VENTRICULAR CONTRACTIONS): ICD-10-CM

## 2021-12-01 DIAGNOSIS — E87.1 HYPONATREMIA: ICD-10-CM

## 2021-12-01 PROBLEM — U07.1 COVID-19 DETERMINED BY CLINICAL DIAGNOSTIC CRITERIA: Status: RESOLVED | Noted: 2020-01-06 | Resolved: 2021-12-01

## 2021-12-01 PROCEDURE — 99214 OFFICE O/P EST MOD 30 MIN: CPT | Performed by: INTERNAL MEDICINE

## 2021-12-01 RX ORDER — HYDROCHLOROTHIAZIDE 25 MG/1
25 TABLET ORAL DAILY
COMMUNITY
Start: 2021-11-18 | End: 2021-12-01

## 2021-12-01 NOTE — PROGRESS NOTES
Date of Office Visit: 2021  Encounter Provider: Vincenzo Hudson MD  Place of Service: [facility] CARDIOLOGY  Patient Name: Diana Bianchi  :1940    Chief Complaint   Patient presents with   • Coronary Artery Disease   :     HPI: Diana Bianchi is a 81 y.o. female who presents today to follow up. I have reviewed prior notes and there are no changes except for any new updates described below. I have also reviewed any information entered into the medical record by the patient or by ancillary staff.      She suffered a myocardial infarction in  while living in [place] and was treated with TPA. She moved to [place] in  and had a cardiac catheterization in . She did not have any type of intervention at that time, and has been treated with medical therapy.  She also has a longstanding hypertension.     In , I noted some lateral T wave inversions; I ordered a Myoview stress which was normal.     In , I noted that she had sinus bradycardia.  A Holter showed good heart rate variability with an average rate of 61 bpm. It did show a 20% burden of monomorphic PVCs.  She walked on a modified Omer treadmill for nine minutes with good HR response. A follow up Holter in  was unchanged. In May 2021, she was noted to have a systolic murmur; an echo revealed normal LVSF, and aortic valve calcification without stenosis.    She has had chronic hyponatremia, Na 128-132, while on amiloride-HCTZ. Recently, her Na dropped to 125 and the thiazides were stopped. She then reported that her BP went up and she had leg swelling.Her Na ora to 139.  I spoke with Dr Patten, and we agreed to restart amiloride, with a goal Na of 130-135.  She just did this yesterday.     She has generally done very well She denies chest pain, dyspnea, orthopnea, edema, palpitations, or syncope. However, because of COVID, she has not been able to swim or exercise like usual, and she has gained a little  "bit of weight.     Past Medical History:   Diagnosis Date   • Aortic calcification (HCC) 5/26/2021    Moderate per echo   • ASCVD (arteriosclerotic cardiovascular disease)     MI 1991, treated in [place] with tPA.  Cath in 2005 in [place], report unavailable, but no stenting was done.   • Blood clot in vein     RIGHT FOOT   • Claustrophobia    • Concussion    • Depression    • Endometrial cancer (HCC)    • GERD (gastroesophageal reflux disease)    • Hypertension    • Limited mobility     RIGHT FOOT/RIGHT ANKLE   • OA (osteoarthritis)     RIGHT FOOT   • MICHAELLE (obstructive sleep apnea)     STATES \"BORDERLINE\", AWAITING FOR ANOTHER TEST . NO MACHINE USE   • PONV (postoperative nausea and vomiting)     N/V    • PVCs (premature ventricular contractions) 9/17/2020   • Right foot pain    • Seasonal allergies        Past Surgical History:   Procedure Laterality Date   • ANKLE FUSION Right 6/28/2021    Procedure: RIGHT TRIPLE ARTHRODESIS 1ST TARSOMETATARSAL JOINT ARTHRODESIS ACHILLES LENGTHENING;  Surgeon: Lyle Rm Jr., MD;  Location: [facility] OR [facility] [place];  Service: Orthopedics;  Laterality: Right;   • CATARACT EXTRACTION WITH INTRAOCULAR LENS IMPLANT Bilateral    • COLONOSCOPY     • ENDOSCOPY     • FOOT SURGERY Left     METAL AND PINS IN LEFT FOOT   • HYSTERECTOMY     • KNEE ARTHROPLASTY Bilateral    • KNEE SURGERY     • LUMBAR FUSION      3-4,4-5   • OOPHORECTOMY Bilateral    • TONSILLECTOMY         Social History     Socioeconomic History   • Marital status: Single   Tobacco Use   • Smoking status: Never Smoker   • Smokeless tobacco: Never Used   • Tobacco comment: caffeine use/ 3 CUPS DAILY    Vaping Use   • Vaping Use: Never used   Substance and Sexual Activity   • Alcohol use: No   • Drug use: No   • Sexual activity: Defer       Family History   Problem Relation Age of Onset   • Heart disease Mother    • Heart disease Father    • Bone cancer Sister    • Breast cancer Sister    • Heart disease Brother    • Stroke " Paternal Grandfather    • Hypertension Other    • Malig Hyperthermia Neg Hx        Review of Systems   Unable to perform ROS  Constitutional: Positive for malaise/fatigue.   Cardiovascular: Negative for chest pain and palpitations.   Respiratory: Negative for shortness of breath.    Musculoskeletal: Positive for myalgias.   Neurological: Negative for light-headedness.   All other systems reviewed and are negative.      Allergies   Allergen Reactions   • Codeine Anaphylaxis   • Fentanyl Shortness Of Breath   • Hydrocodone-Acetaminophen Shortness Of Breath   • Morphine And Related Anaphylaxis   • Oxycodone-Acetaminophen Shortness Of Breath   • Peanut (Diagnostic) Anaphylaxis   • Peanut Oil Shortness Of Breath   • Zithromax [Azithromycin] Anaphylaxis   • Statins Other (See Comments)         • Prednisone Unknown - Low Severity         Current Outpatient Medications:   •  acetaminophen (TYLENOL) 500 MG tablet, Take 500 mg by mouth Every 6 (Six) Hours As Needed for Mild Pain ., Disp: , Rfl:   •  aMILoride (MIDAMOR) 5 MG tablet, Take 1 tablet by mouth Daily., Disp: 90 tablet, Rfl: 1  •  aspirin  MG tablet, Take 1 tablet by mouth Daily., Disp: 30 tablet, Rfl: 0  •  Calcium-Magnesium-Vitamin D (CALCIUM MAGNESIUM PO), Take 1 tablet by mouth Every Night. HOLD PRIOR TO OR, Disp: , Rfl:   •  famotidine (Pepcid) 20 MG tablet, Take 1 tablet by mouth 2 (Two) Times a Day., Disp: 180 tablet, Rfl: 2  •  fexofenadine (ALLEGRA) 180 MG tablet, Take 180 mg by mouth Daily., Disp: , Rfl:   •  fluticasone (FLONASE) 50 MCG/ACT nasal spray, 2 sprays into the nostril(s) as directed by provider 2 (two) times a day. A, Disp: 16 g, Rfl: 10  •  losartan (COZAAR) 50 MG tablet, TAKE ONE TABLET BY MOUTH DAILY, Disp: 90 tablet, Rfl: 3  •  multivitamin with minerals (MULTIVITAMIN ADULT PO), Take 1 tablet by mouth Daily. HOLD FOR SURGERY, Disp: , Rfl:   •  ondansetron (ZOFRAN) 4 MG tablet, Take 1 tablet by mouth Every 6 (Six) Hours As Needed for  "Nausea., Disp: 20 tablet, Rfl: 0  •  traMADol (ULTRAM) 50 MG tablet, Take 1-2 tablets by mouth every 4 hours PRN pain; 1 tab for 3-6/10 or 2 tabs for 7-10/10, Disp: 30 tablet, Rfl: 0     Objective:     Vitals:    12/01/21 1311   BP: 152/80   BP Location: Left arm   Pulse: 50   Weight: 77.7 kg (171 lb 6.4 oz)   Height: 157.5 cm (62\")     Body mass index is 31.35 kg/m².    Physical Exam  Vitals reviewed.   Constitutional:       Appearance: She is well-developed.   HENT:      Head: Normocephalic.      Nose: Nose normal.      Mouth/Throat:      Comments: Masked  Eyes:      Conjunctiva/sclera: Conjunctivae normal.   Neck:      Vascular: No JVD.   Cardiovascular:      Rate and Rhythm: Regular rhythm. Bradycardia present.      Pulses: Intact distal pulses.      Heart sounds: Normal heart sounds. No murmur heard.      Pulmonary:      Effort: Pulmonary effort is normal.      Breath sounds: Normal breath sounds.   Abdominal:      Palpations: Abdomen is soft.      Tenderness: There is no abdominal tenderness.   Musculoskeletal:         General: Normal range of motion.      Cervical back: Normal range of motion.   Skin:     General: Skin is warm and dry.      Findings: No rash.   Neurological:      General: No focal deficit present.      Mental Status: She is alert and oriented to person, place, and time.      Cranial Nerves: No cranial nerve deficit.   Psychiatric:         Mood and Affect: Mood normal.         Behavior: Behavior normal.         Thought Content: Thought content normal.         Procedures    Assessment:       Diagnosis Plan   1. Coronary artery disease involving native coronary artery of native heart without angina pectoris     2. Primary hypertension     3. Hyponatremia     4. PVCs (premature ventricular contractions)     5. Bradycardia, sinus     6. Aortic calcification (HCC)            Plan:     1.  Coronary Artery Disease  Assessment  • The patient has no angina    Subjective - Objective  • There is a " history of past MI 1991  • Current antiplatelet therapy includes aspirin 81 mg    She is intolerant of statins.  A Myoview was normal in 2017.    2/3.  Her BP is a bit high today but she just restarted amiloride yesterday. It will take ~5 days to reach steady state. While on amiloride and HCTZ, her Na was too low for age. However, I think we'll have to tolerate mild hyponatremia, so she will remain on amiloride alone along with losartan. She does not tolerate amlodipine due to leg swelling, and AVN blockers are contraindicated due to her resting HR.     4/5. Her sinus rate typically runs in the low 50s.  She has asymptomatic, frequnt monomorphic PVCs.  Because of her rate, I can't add a beta blocker. However, as her burden is <20%, I'm not too worried.     6. She had AoV calcification without stenosis in May 2021; she has a harsh systolic murmur. I'll repeat an echo next year.    Sincerely,       Vincenzo Hudson MD

## 2022-02-25 ENCOUNTER — TELEPHONE (OUTPATIENT)
Dept: CARDIOLOGY | Facility: CLINIC | Age: 82
End: 2022-02-25

## 2022-02-25 RX ORDER — AMLODIPINE BESYLATE 2.5 MG/1
2.5 TABLET ORAL DAILY
Qty: 90 TABLET | Refills: 3 | Status: SHIPPED | OUTPATIENT
Start: 2022-02-25 | End: 2022-06-06 | Stop reason: SDUPTHER

## 2022-02-25 NOTE — TELEPHONE ENCOUNTER
Recalled Diana Bianchi to check BP.    Patient has been resting for about 45 minutes.    L arm: 178/103, HR 60 (patient was talking during this reading)    R arm: 187/98, HR 60 (no talking)    Patient stated the slight tightness/indigeston she felt under her left armpit is gone as well.    Please let me know how you would like to proceed.    Thank you,  Gita Andrade RN  [place] Nurse [facility] [place]

## 2022-02-25 NOTE — TELEPHONE ENCOUNTER
I can't increase her losartan or add spirono as her K+ runs on the high side. I can't increase amiloride as she gets hyponatremia. Her HR is on the low end so I want to avoid a BB.    My preference would be to add 2.5mg of amlodipine. In the past, when she was on higher doses of this, it caused edema. However, this low of a dose will likely be fine. Is she amenable to that?     Lars woodruff

## 2022-02-25 NOTE — TELEPHONE ENCOUNTER
"Diana Bianchi called to report high blood pressure. Patient stated that while at her water therapy appointment her /99 and her therapist asked her to inform our office of this.      Patient reports that she is able to care for herself today and drive her car but that she is slightly lightheaded. Patient reported some \"tightness\" under her L armpit, stating it feels like indigestion which she gets sometimes.    Patient reported she was to received a spinal epidural yesterday, however, when the anesthetic injection was performed she had shooting pain in her legs so the procedure was stopped.  BP was 184/121 and patient had to wait for BP to come down before staff would allow her to leave. Patient did not check BP when she returned home.      Most recent vitals  /103 (right arm)    Patient did state she has been doing some light housework since returning home from her appointment and was only resting \"maybe 10 minutes\" before this BP reading. I asked her to rest and recheck her BP.     Cardiac Meds  Amiloride 5mg, daily  Losartan 50mg, daily    Please let me know how you would like to proceed.    Thank you,  Gita Andrade RN  Triage Nurse [facility] [place]  "

## 2022-02-25 NOTE — TELEPHONE ENCOUNTER
Reviewed recommendations with Diana Bianchi and she is agreeable to trying the low dose of amlodipine.  Please send to [facility] pharmacy.    I asked her to keep a log over the next week and provide to the office and she stated she would.    Thank you,  Gita Andrade RN  Triage Nurse [facility] [place]

## 2022-03-17 ENCOUNTER — TREATMENT (OUTPATIENT)
Dept: PHYSICAL THERAPY | Facility: CLINIC | Age: 82
End: 2022-03-17

## 2022-03-17 DIAGNOSIS — M25.551 RIGHT HIP PAIN: ICD-10-CM

## 2022-03-17 DIAGNOSIS — R26.81 UNSTEADY GAIT: ICD-10-CM

## 2022-03-17 DIAGNOSIS — R26.9 ABNORMAL GAIT: Primary | ICD-10-CM

## 2022-03-17 DIAGNOSIS — M54.16 RADICULOPATHY, LUMBAR REGION: ICD-10-CM

## 2022-03-17 PROCEDURE — 97162 PT EVAL MOD COMPLEX 30 MIN: CPT | Performed by: PHYSICAL THERAPIST

## 2022-03-17 PROCEDURE — 97110 THERAPEUTIC EXERCISES: CPT | Performed by: PHYSICAL THERAPIST

## 2022-03-17 NOTE — PROGRESS NOTES
Physical Therapy Initial Evaluation and Plan of Care      Patient: Diana Bianchi   : 1940  Diagnosis/ICD-10 Code:  Acute right hip pain [M25.551]  Referring practitioner: Walter Milian MD  Date of Initial Visit: 3/17/2022  Today's Date: 3/17/2022  Patient seen for 1 sessions           Subjective Evaluation    History of Present Illness  Date of onset: 2022  Mechanism of injury: Special tests of (R) hip (-) include xray/ mri; scheduled for EMG study    PSHx (R) foot/ankle fusion may 2022-resulting in extensive NWB precautions fro 4-5 months.    Lumbar fusion L3-5    (B) TKA      Pt reports poor tolerance/ exacerbating symptoms with standing/ walking activities > 15-20 mins.    Subjective comment: pt p presents to PT reporting progressive hx of radiating (R) hip-buttocks-thing pain.Quality of life: fair    Pain  Current pain ratin  At best pain ratin  At worst pain ratin  Quality: grinding, sharp, radiating, tight, pulling, knife-like and discomfort  Aggravating factors: ambulation, squatting, stairs, standing, movement, lifting, sleeping, repetitive movement and prolonged positioning  Progression: worsening    Patient Goals  Patient goals for therapy: increased strength, independence with ADLs/IADLs, return to sport/leisure activities, return to work, increased motion, improved balance and decreased pain             Objective          Postural Observations  Seated posture: fair  Standing posture: fair        Palpation     Right Tenderness of the lumbar paraspinals and quadratus lumborum.     Additional Palpation Details  (R) buttocks pain    Neurological Testing     Sensation     Lumbar     Right   Diminished: light touch    Comments   Right light touch: L3-5 nt (R)    Reflexes   Left   Patellar (L4): absent (0)  Achilles (S1): absent (0)    Right   Patellar (L4): absent (0)  Achilles (S1): absent (0)    Active Range of Motion   Cervical/Thoracic Spine     Thoracic   Flexion:  WFL  Extension: WFL  Left lateral flexion: WFL  Right lateral flexion: WFL  Left rotation: WFL  Right rotation: WFL    Lumbar   Flexion: 50 degrees with pain  Extension: 50 degrees with pain  Left lateral flexion: 50 degrees with pain  Right lateral flexion: 50 degrees with pain  Left rotation: 50 degrees with pain  Right rotation: 50 degrees with pain    Strength/Myotome Testing     Left Hip   Planes of Motion   Flexion: 4  Extension: 4-  Abduction: 4-  Adduction: 4  External rotation: 4  Internal rotation: 4    Right Hip   Planes of Motion   Flexion: 4-  Extension: 3+  Abduction: 3+  Adduction: 4-  External rotation: 4-  Internal rotation: 4-    Left Knee   Flexion: 4  Extension: 4    Right Knee   Flexion: 4-  Extension: 4-    Left Ankle/Foot   Dorsiflexion: 4+  Plantar flexion: 4+  Inversion: 4+  Eversion: 4+  Great toe flexion: 4+  Great toe extension: 4+    Right Ankle/Foot   Dorsiflexion: 4-  Plantar flexion: 4-  Inversion: 4-  Eversion: 4-  Great toe flexion: 4-  Great toe extension: 4-    Additional Strength Details  Core trunk lumbar stabilization 4-/5    Tests       Thoracic   Negative slump.     Lumbar     Left   Negative passive SLR and valsalva.     Right   Negative passive SLR and valsalva.      General Comments     Lumbar Comments  Pt ambulates with use of spc; unsteady gait in frontal plane. Limited tolerance with prolonged ambulating > 15-20 mins; pain with all transfers; poor tolerance ascending stairs.  Pain with modified lifting task > 5-10 mins; difficulty with dressing/ grooming activities.  Hx of htn; social : lives alone       See Exercise, Manual, and Modality Logs for complete treatment.       Functional Outcome Score: LE fx score 22; timed sit-stand x 5 test 24 s        Assessment & Plan     Assessment  Impairments: abnormal gait, abnormal or restricted ROM, activity intolerance, impaired balance, impaired physical strength and pain with function  Functional Limitations: carrying objects,  lifting, sleeping, walking, uncomfortable because of pain, moving in bed, sitting, standing and stooping  Assessment details: Diana Bianchi is a 81 y.o. year-old female referred to physical therapy for (R) L3-5 radiculopathy-(R) hip /LBP/ unsteady gait. PMHx; (R) ankle fusion My 2022/ lumbar fusion 2010 L3-5/ (B) TKA / HTN. Pt reports hx of falls and no personal factors  that may affect her progress in the plan of care.   Pt reports 6/ 10 (R) LBP/ (R) buttocks pain; severe ttp (R) L3-5 paraspinal ttp / (R) QL; difficulty with all transfers; pain is most pronounced with prolonged standing/ walking activities > 15 mins;  Pt has fear of falling notes apprehension ascending stairs . Pt ambulates with use of spc.  Decreased core trunk lumbar stabilization/ (R) LE MMT 4-/5; Signs and symptoms are consistent with physical therapy diagnosis of (R) L3-5 radiculopathy/ (R) LBp/ unsteady gait. Patient is appropriate for skilled physical therapy in order to reduce pain and increase ease with daily mobility.   Prognosis: good    Goals  Plan Goals: Short Term Goals for completion in 30 days:   -Patient will report a reduction in pain for 12-24 hours or greater following aquatic therapy session  -Patient will demonstrate good core stabilization strength with advanced  aquatic exercises such as bicycle kicks and tuck ups to help improve postural stability  -Patient will report increased standing tolerance from 15 min to 25 min or greater to allow patient to complete ADLs such as cooking without pain/discomfort  -increase lumbar AROM to WFL to allow for increased ease with dressing/ grooming/ bathing activities  -decreased (R) QL ms guarding from moderate to minimal to allow for increased ease with all transfers.    LTGs for completion within 90 days:  -Patient will demonstrate sit to stand without use of hands in order to increase functional strength  -Patient will increase walking tolerance from 15 min to 30 min or greater to  allow for patient to walk for leisure/exercise  -Patient will demonstrate independence with water walks and stretches to promote independent managment of condition  -Patient will improve 5xSTS from 24 seconds to </=20 seconds in order to decrease risk of falls and increase functional strength  -Patient will increase LE strength-core trunk lumbar stabilization to 4/5 or greater to increase LE stability during gait  -pt to tolerate frontal plane movement patterns without evidence of LOB consistently.  -pt to tolerate ascending stairs in pool to all for transitional functional improvement with entering home more confidently.   -improve LE fx scale score from 22 to 42    Plan  Therapy options: will be seen for skilled therapy services  Planned therapy interventions: postural training, stretching, therapeutic activities, strengthening, gait training, functional ROM exercises and abdominal trunk stabilization  Other planned therapy interventions: Aquatic therapy  Frequency: 2x week (36 visits)  Duration in weeks: 12  Plan details: Aquatic therapy for core stabilization, LE strength/stability, gait training, balance, and posture        Timed:  Manual Therapy:        mins  77213;  Therapeutic Exercise:    10    mins  11738;     Neuromuscular [place]:        mins  96091;    Therapeutic Activity:          mins  49349;     Gait Training:           mins  17749;     Ultrasound:          mins  78916;    Iontophoresis         mins 26897  Dry Needling        mins 87961/ 78002 (Self-pay)      Untimed:  Electrical Stimulation:         mins  01208 ( );  Traction:       mins  86066;   Low Eval          Mins  56982  Mod Eval   25       Mins  00266  High Eval                            Mins  32595    Timed Treatment:  35    mins   Total Treatment:   35    mins    PT SIGNATURE: Malachi Parikh PT     License Number: [id number]    Electronically signed by Malachi Parikh PT, 03/17/22, 8:49 AM EDT    DATE TREATMENT INITIATED:  3/17/2022    Initial Certification  Certification Period: 6/15/2022  I certify that the therapy services are furnished while this patient is under my care.  The services outlined above are required by this patient, and will be reviewed every 90 days.     PHYSICIAN: Walter Milian MD   NPI: [id number]                                         DATE:     Please sign and return via fax to [phone] Thank you, [facility].

## 2022-03-21 ENCOUNTER — TELEPHONE (OUTPATIENT)
Dept: PHYSICAL THERAPY | Facility: OTHER | Age: 82
End: 2022-03-21

## 2022-03-22 ENCOUNTER — TREATMENT (OUTPATIENT)
Dept: PHYSICAL THERAPY | Facility: CLINIC | Age: 82
End: 2022-03-22

## 2022-03-22 DIAGNOSIS — R26.9 ABNORMAL GAIT: Primary | ICD-10-CM

## 2022-03-22 DIAGNOSIS — R26.81 UNSTEADY GAIT: ICD-10-CM

## 2022-03-22 DIAGNOSIS — M25.551 RIGHT HIP PAIN: ICD-10-CM

## 2022-03-22 DIAGNOSIS — M54.16 RADICULOPATHY, LUMBAR REGION: ICD-10-CM

## 2022-03-22 PROCEDURE — 97113 AQUATIC THERAPY/EXERCISES: CPT | Performed by: PHYSICAL THERAPIST

## 2022-03-22 NOTE — PROGRESS NOTES
Physical Therapy Daily Progress Note    Patient: Diana Bianchi   : 1940  Diagnosis/ICD-10 Code:  Abnormal gait [R26.9]  Referring practitioner: Walter Milian MD  Date of Initial Visit: Type: THERAPY  Noted: 3/17/2022  Today's Date: 3/22/2022  Patient seen for 2 sessions             Subjective Evaluation    History of Present Illness    Subjective comment: I'm stiff today, pain about 6-7/10 - not too bad.I really want to be able to walk better.  I used to like to walk and now I have to use a cane and I waddle back and forth.       Objective      AQUATIC EX:    Water Walk   Forward x 5 laps, 2 laps with noodle support, 1 lap with yellow foam DB, 2 laps without UE support   Stretch 1   HS 20 sec x 2  Stretch 2   Piriformis 20 sec x 2  Stretch 3   -  Stretch Other 1  Wall 30 sec x 2  Stretch Other 2  -  Vertical Traction  LN/rail x 2-3 min  Hip sweeps   10x, LN under flexed knee  Abdominals   LN x 10, back at wall  Clams    15x, suspended  Hip Abd/Add   2 x 5 ea, rail support  Hip Flex/Ext   2 x 5 ea, rail support  March in Place  15x ea leg, rail support  Mini Squat   12x, gluteal squeeze, rail support  Toe/Heel Raises  -  Uni-Squat   -  Uni-Clock   -  Step Ups   -  Bicycle   2 min, seated  Flutter/Scissor  - / 15  Exercise 1   -  Exercise 2   -  Exercise 3   -  Exercise 4   -  Exercise 5  -  Exercise 6  -      Assessment & Plan     Assessment    Assessment details: Patient seen today for initial aquatic therapy session including education and instruction in basic aquatic ex/activity for mobility, flexibility, and strength/stabilization.  Emphasis placed on trying to normalize gait mechanics with water walking and limiting lateral trunk lean.  Performed 2 sets of 5 with standing hip kicks to minimize compensation (lateral lean) with prolonged single limb WB 2/2 decreased core/hip stabilization.  She required rail support with standing LE ex and was instructed to try and maintain upright posture / limit  lateral trunk lean during ex.  PT provided demonstration and cuing throughout session for optimal posture, core/glut activation, and correct form/technique with ex/activity.    Plan:  Assess response to initial aquatic session and modify/progress as appropriate.                  Timed:  Aquatic Therapy    41     mins 17266;    Neva Zapata, PT  Physical Therapist    [place] License: [id number]

## 2022-03-25 ENCOUNTER — TREATMENT (OUTPATIENT)
Dept: PHYSICAL THERAPY | Facility: CLINIC | Age: 82
End: 2022-03-25

## 2022-03-25 DIAGNOSIS — M25.551 RIGHT HIP PAIN: ICD-10-CM

## 2022-03-25 DIAGNOSIS — M54.16 RADICULOPATHY, LUMBAR REGION: ICD-10-CM

## 2022-03-25 DIAGNOSIS — R26.9 ABNORMAL GAIT: Primary | ICD-10-CM

## 2022-03-25 DIAGNOSIS — R26.81 UNSTEADY GAIT: ICD-10-CM

## 2022-03-25 PROCEDURE — 97113 AQUATIC THERAPY/EXERCISES: CPT | Performed by: PHYSICAL THERAPIST

## 2022-03-25 NOTE — PROGRESS NOTES
Physical Therapy Daily Progress Note    Patient: Diana Bianchi   : 1940  Diagnosis/ICD-10 Code:  Abnormal gait [R26.9]  Referring practitioner: Walter Milian MD  Date of Initial Visit: Type: THERAPY  Noted: 3/17/2022  Today's Date: 3/25/2022  Patient seen for 3 sessions             Subjective Evaluation    History of Present Illness    Subjective comment: I was exhausted after last time and the next day I was sore/stiff all over but think it was a good thing - I could tell I used muscles in ways they hadn't been used for a while.         Objective      AQUATIC EX:     Water Walk                 Forward x 6 laps,without noodle support  Stretch 1                      HS 20 sec x 2  Stretch 2                      Piriformis 20 sec x 2  Stretch 3                      -  Stretch Other 1           Wall 30 sec x 2  Stretch Other 2           -  Vertical Traction          LN/rail x 2-3 min  Hip sweeps                 12x, LN under flexed knee  Abdominals                 LN x 12, back at wall  Clams                          15x, suspended  Hip Abd/Add                2 x 5 ea, rail support  Hip Flex/Ext                 2 x 5 ea, rail support  March in Place            15x ea leg, rail support  Mini Squat                   12x, gluteal squeeze, rail support  Toe/Heel Raises         -  Uni-Squat                    -  Uni-Clock                    -  Step Ups                     -  Bicycle                         2 min, seated  Flutter/Scissor             - / 15  Exercise 1                   -  Exercise 2                   -  Exercise 3                   -  Exercise 4                   -  Exercise 5                   -  Exercise 6                   -    Assessment & Plan     Assessment    Assessment details: Patient reports exhaustion, soreness all over, and stiffness after initial aquatic therapy session.  Continued with most previous aquatic ex/activity for mobility, flexibility, and strength/stabilization.  She  "walked without noodle support focusing on trying to normalize gait mechanics and minimize lateral trunk lean.  Increased reps on a couple of ex this visit.  She requires rail support for performance of stranding LE ex and back support for seated LE ex.  Lateral trunk lean noted with standing hip abd kicks which was less pronounced with reduced ROM.  By end of session, she noted \"feeling it\" in her lower back and R buttock / posterior hip (but \"wouldn't call it pain\").  Cuing and demonstration provided throughout session for optimal posture, core/glut activation, and correct form/technique with ex/activity.    Plan:  Continue to assess patient response to aquatic ex/activity and modify/progress as appropriate.                  Timed:  Aquatic Therapy    41     mins 14400;    Neva Zapata PT  Physical Therapist    [place] License: [id number]  "

## 2022-03-31 ENCOUNTER — TREATMENT (OUTPATIENT)
Dept: PHYSICAL THERAPY | Facility: CLINIC | Age: 82
End: 2022-03-31

## 2022-03-31 DIAGNOSIS — M54.16 RADICULOPATHY, LUMBAR REGION: ICD-10-CM

## 2022-03-31 DIAGNOSIS — R26.9 ABNORMAL GAIT: Primary | ICD-10-CM

## 2022-03-31 DIAGNOSIS — R26.81 UNSTEADY GAIT: ICD-10-CM

## 2022-03-31 DIAGNOSIS — M25.551 RIGHT HIP PAIN: ICD-10-CM

## 2022-03-31 PROCEDURE — 97113 AQUATIC THERAPY/EXERCISES: CPT | Performed by: PHYSICAL THERAPIST

## 2022-03-31 NOTE — PROGRESS NOTES
Physical Therapy Daily Progress Note    Patient: Diana Bianchi   : 1940  Diagnosis/ICD-10 Code:  Abnormal gait [R26.9]  Referring practitioner: No ref. provider found  Date of Initial Visit: Type: THERAPY  Noted: 3/17/2022  Today's Date: 3/31/2022  Patient seen for 4 sessions             Subjective Evaluation    History of Present Illness    Subjective comment: A llittle sore after last time but seems to be getting less and less ea visit.       Objective     AQUATIC EX:     Water Walk                 Forward x 6 laps,without noodle support  Stretch 1                      HS 20 sec x 2  Stretch 2                      Piriformis 20 sec x 2  Stretch 3                      -  Stretch Other 1           Wall 30 sec x 2  Stretch Other 2           -  Vertical Traction          LN/rail x 2-3 min  Hip sweeps                 15x, LN under flexed knee  Abdominals                 LN x 15, back at wall  Clams                          15x, suspended  Hip Abd/Add                2 x 5 ea, rail support  Hip Flex/Ext                 2 x 5 ea, rail support  March in Place            15x ea leg, rail support  Mini Squat                   15x, gluteal squeeze, rail support  Toe/Heel Raises         -  Uni-Squat                    -  Uni-Clock                    -  Step Ups                     -  STS from pool bench 5x, no hands  Bicycle                         2 min, seated  Flutter/Scissor             - / 15  Exercise 1                   -  Exercise 2                   -  Exercise 3                   -  Exercise 4                   -  Exercise 5                   -  Exercise 6                   -  Assessment & Plan     Assessment    Assessment details: Patient reports some soreness but not as bad as it was after the first time.  She states she can tell she's using muscles she hasn't been using which she believes is a good thing.  Continued with most previous aquatic ex/activity for mobility, flexibility, and  strength/stabilization.  She is walking without UE support with emphasis on trying to normalize gait mechanics and limit lateral trunk lean.  Increased reps on a few ex and added STS from pool bench this visit.  She was surprised by how challenging STS was in the water without using her hands.  She depends on rail support for performance of standing LE ex and back support for seated LE ex.  Reduced lateral trunk lean with standing hip abd kicks this date.  Cuing and demonstration provided throughout session for optimal posture, core/glut activation, and correct form/technique with ex/activity.    Plan:  Continue to assess patient response to aquatic ex/activity and modify/progress as appropriate.                  Timed:  Aquatic Therapy    42     mins [id number];    Neva Zapata PT  Physical Therapist    [place] License: [id number]

## 2022-04-19 ENCOUNTER — TREATMENT (OUTPATIENT)
Dept: PHYSICAL THERAPY | Facility: CLINIC | Age: 82
End: 2022-04-19

## 2022-04-19 DIAGNOSIS — M25.551 RIGHT HIP PAIN: ICD-10-CM

## 2022-04-19 DIAGNOSIS — R26.81 UNSTEADY GAIT: ICD-10-CM

## 2022-04-19 DIAGNOSIS — R26.9 ABNORMAL GAIT: Primary | ICD-10-CM

## 2022-04-19 DIAGNOSIS — M54.16 RADICULOPATHY, LUMBAR REGION: ICD-10-CM

## 2022-04-19 PROCEDURE — 97113 AQUATIC THERAPY/EXERCISES: CPT | Performed by: PHYSICAL THERAPIST

## 2022-04-19 NOTE — PROGRESS NOTES
Physical Therapy 30-Day / 10-Visit Progress Note         Patient: Diana Bianchi   : 1940  Diagnosis/ICD-10 Code:  Abnormal gait [R26.9]  Referring practitioner: Walter Milian MD  Date of Initial Visit: Type: THERAPY  Noted: 3/17/2022  Today's Date: 2022  Patient seen for 5 sessions      Subjective:     Clinical Progress: improved  Home Program Compliance: N/A  Treatment has included:  aquatic therapy    Subjective Evaluation    History of Present Illness    Subjective comment: Had injection in hip - had a reaction with HA, nausea, etc., and it hasn't helped my pain.      Objective    Posture:  FH/rounded shoulders, mild FF trunk, widened JOE    Lumbar AROM:  Flexion:  WNL w/ some R buttock pain  Extension:  grossly 40% w/ R buttock pain  Lateral Flexion:  R 35% w/ R LB/buttock pain, L 25% - feels locked, as it just doesn’t want to go  Rotation:  R 50%, L 60% with minimal discomfort    MMT:    Hip Flexion:  L 4/5, R 4-/5    Hip Extension:  L 4-5, R 3+/5 (assessed poolside in standing with light B UE support)  Hip Abduction:  grossly 3/5, B (assessed poolside in standing with light B UE support)  Hip ER:  L 4/5, R 4-/5 (sitting)  Knee Flexion: L 4/5, R 4-/5  Knee Extension: L 4/5, R 4-/5  Ankle/Foot DF:  L 4/5, R 4-/5  Ankle/Foot PF:  L 4/5, R 4-/5   Core:  grossly 4-/5    STS transition:  Able to rise from sitting in poolside chair with hands on thighs - an effort    Gait:  mild FF trunk, widened JOE, lateral trunk sway B, using rollator.  Increased lateral trunk lean when ambulating without AD.    Functional Outcome Score:   LEFS score = 42/80 or 52.5% ability (was 22/80 or 27.5% ability at eval)  5x STS = 20.33 sec, hands on thighs on a couple reps and used chair arms for remaining reps.  (was 24 sec at eval    AQUATIC EX:     Water Walk                 Forward x 6 laps,without noodle support  Stretch 1                      HS 20 sec x 2  Stretch 2                      Piriformis 20 sec x 2  Stretch  3                      -  Stretch Other 1           Wall 30 sec x 2  Stretch Other 2           -  Vertical Traction          LN/rail x 2-3 min  Hip sweeps                 15x, LN under flexed knee  UTR w/ noodle  10x ea side, comfortable range  Abdominals                 LN x 15, back at wall  Clams                          15x, suspended  Hip Abd/Add                2 x 5 ea, rail support  Hip Flex/Ext                 2 x 5 ea, rail support  March in Place            15x ea leg, rail support  Mini Squat                   15x, gluteal squeeze, rail support  Toe/Heel Raises         -  Uni-Squat                    -  Uni-Clock                    -  Step Ups                     -  STS from pool bench  5x, no hands  Bicycle                         2 min, seated  Flutter/Scissor             - / 15  Exercise 1                   -  Exercise 2                   -  Exercise 3                   -  Exercise 4                   -  Exercise 5                   -  Exercise 6                   -      Assessment & Plan     Assessment    Assessment details: Diana Bianchi is a 81 y.o. F who has attended 4 aquatic therapy sessions since her evaluation on 3/17/2022 for treatment of LBP / (R) L3-5 radiculopathy, R) hip pain, and unsteady gait.  She has comorbidities / personal factors including hx of (R) ankle fusion May 2021, L3-L5 fusion 2010, (B) TKA, HTN, hx of falls and self-reported fear of falling that may affect her progress in the plan of care.  She reports being sore and tired after aquatic therapy sessions.  She is able to transition STS with hands on thighs with notable effort.  She demonstrates antalgic/compensated gait using rollator for walking in/out of facility.  She enters/exits pool via stairs with non reciprocal pattern using moderate/heavy rail support.  She reports improvement in standing / walking since starting aquatic therapy but still struggles with STS transition and stairs.  She also voices a fear of falling.   She has partially met/met 2 of 5 STGs and 3 of 8 LTGs with remaining STGs/LTGs ongoing/progressing.  Patient is appropriate for continuation of skilled therapy to help reduce pain, increase ease with daily functional mobility, and facilitate transition to independent self care.     Goals  Plan Goals: Short Term Goals for completion in 30 days:   -Patient will report a reduction in pain for 12-24 hours or greater following aquatic therapy session.  Met, tired afterwards but typically feels better for a couple of days following aquatic therapy sessions  -Patient will demonstrate good core stabilization strength with advanced aquatic exercises such as bicycle kicks and tuck ups to help improve postural stability.  Ongoing, Fair stabilization with seated bicycle, using back support and hands on bench to help stabilize.  -Patient will report increased standing tolerance from 15 min to 25 min or greater to allow patient to complete ADLs such as cooking without pain/discomfort.  Met, reports ability to stand 25-30 min  -increase lumbar AROM to WFL to allow for increased ease with dressing/ grooming/ bathing activities.  Ongoing, able to complete dressing/bathing/grooming activities independently but slow/cautious with mobility, lumbar AROM remains limited and/or painful all planes  -decreased (R) QL ms guarding from moderate to minimal to allow for increased ease with all transfers.  Ongoing, slower and more cautious with transitional movements  LTGs for completion within 90 days:  -Patient will demonstrate sit to stand without use of hands in order to increase functional strength.  Partially Met, able to rise with from poolside chair with hands on thighs but difficult.  -Patient will increase walking tolerance from 15 min to 30 min or greater to allow for patient to walk for leisure/exercise.  Partially Met, reports ability to walk at least ½ hour with use of rollator  -Patient will demonstrate independence with water  walks and stretches to promote independent management of condition.  Ongoing, continues to require cuing for upright posture and trying to limit lateral trunk sway with walking and proper form/technique with stretches  -Patient will improve 5xSTS from 24 seconds to </=20 seconds in order to decrease risk of falls and increase functional strength.  Ongoing/progressing, 5x STS completed in 20.33 sec today with UE assist prn  -Patient will increase LE strength-core trunk lumbar stabilization to 4/5 or greater to increase LE stability during gait.  Ongoing  -pt to tolerate frontal plane movement patterns without evidence of LOB consistently.  Ongoing  -pt to tolerate ascending stairs in pool to all for transitional functional improvement with entering home more confidently.  Ongoing, challenging, doing steps non reciprocally with rail support  -improve LE fx scale score from 22 to 42.  Met, LEFS score today = 42/80, will monitor for consistency             Recommendations: Continue as planned  Timeframe: 1 month  Prognosis to achieve goals: fair/good    PT Signature: Neva Zapata PT  [place] License:  [id number]      Based upon review of the patient's progress and continued therapy plan, it is my medical opinion that Diana Bianchi should continue physical therapy treatment at [facility]  [address] [place] [address] [phone].    Signature: __________________________________  Walter Milian MD  NPI: [id number]                                          Timed:  Aquatic therapy  40785    54   mins

## 2022-04-21 ENCOUNTER — TREATMENT (OUTPATIENT)
Dept: PHYSICAL THERAPY | Facility: CLINIC | Age: 82
End: 2022-04-21

## 2022-04-21 DIAGNOSIS — M25.551 RIGHT HIP PAIN: ICD-10-CM

## 2022-04-21 DIAGNOSIS — M54.16 RADICULOPATHY, LUMBAR REGION: ICD-10-CM

## 2022-04-21 DIAGNOSIS — R26.9 ABNORMAL GAIT: Primary | ICD-10-CM

## 2022-04-21 DIAGNOSIS — R26.81 UNSTEADY GAIT: ICD-10-CM

## 2022-04-21 PROCEDURE — 97113 AQUATIC THERAPY/EXERCISES: CPT | Performed by: PHYSICAL THERAPIST

## 2022-04-21 NOTE — PROGRESS NOTES
Physical Therapy Daily Progress Note    Patient: Diana Bianchi   : 1940  Diagnosis/ICD-10 Code:  Abnormal gait [R26.9]  Referring practitioner: Walter Milian MD  Date of Initial Visit: Type: THERAPY  Noted: 3/17/2022  Today's Date: 2022  Patient seen for 6 sessions             Subjective Evaluation    History of Present Illness    Subjective comment: My L side LB/hip is talking to me this morning (R hip usually the one that bothers me).  Maybe it's from doing more walking with rollator yesterday.  I'd say my pain is about 4-5/10 today, did take Tylenol before I came.         Objective     AQUATIC EX:     Water Walk                 Forward x 4 laps,without noodle support  March walking  1 lap w/ LN support  Stretch 1                      HS 20 sec x 2  Stretch 2                      Piriformis 20 sec x 2  Stretch 3                      -  Stretch Other 1           Wall 30 sec x 2  Stretch Other 2           -  Vertical Traction          LN/rail x 2-3 min  Hip sweeps                 15x, LN under flexed knee  UTR w/ noodle            10x ea side, comfortable range  Abdominals                 LN x 15, back at wall  Clams                          15x, suspended  Hip Abd/Add                10x ea, rail support  Hip Flex             10x ea, back at wall  Hip Extension  -  March in Place            15x ea leg, rail support  Mini Squat                   15x, gluteal squeeze, rail support  Toe/Heel Raises         -  Uni-Squat                    -  Uni-Clock                    -  Step Ups                     -  STS from pool bench  5x, no hands  Bicycle                         2 min, seated  Flutter/Scissor            15 / 15  Exercise 1                   Alt paddle rows x 10 ea (L3 paddles)  Exercise 2                   -  Exercise 3                   -  Exercise 4                   -  Exercise 5                   -  Exercise 6                   -    Assessment & Plan     Assessment    Assessment details:  Patient reports doing okay after last aquatic session but states her L LB/hip is bothering her this morning.  Continued with most previous aquatic ex/activity for mobility, flexibility, and strength/stabilization.  Focusing on posture and trying to normalize gait mechanics with walking in the water.  Added scissor kicks and alt paddle rows this visit.  Increased reps on a few ex and added STS from pool bench this visit.  She continues to be challenged with STS from pool bench in the water w/o using her hands.  She depends on rail support for performance of standing LE ex and back/B UE support for seated LE ex.  Cuing and demonstration provided throughout session for optimal posture, core/glut activation, and correct form/technique with ex/activity.    Plan:  Continue with aquatic ex/activity and progress per patient tolerance.  May consider adding hip extension, march walk, and/or leg press in future sessions.                 Timed:  Aquatic Therapy    42     mins [id number];    Neva Zapata PT  Physical Therapist    [place] License: [id number]

## 2022-05-27 RX ORDER — AMILORIDE HYDROCHLORIDE 5 MG/1
TABLET ORAL
Qty: 90 TABLET | Refills: 0 | Status: SHIPPED | OUTPATIENT
Start: 2022-05-27 | End: 2022-08-19

## 2022-06-02 ENCOUNTER — OFFICE VISIT (OUTPATIENT)
Dept: CARDIOLOGY | Facility: CLINIC | Age: 82
End: 2022-06-02

## 2022-06-02 ENCOUNTER — HOSPITAL ENCOUNTER (OUTPATIENT)
Dept: CARDIOLOGY | Facility: HOSPITAL | Age: 82
Discharge: HOME | End: 2022-06-02
Admitting: INTERNAL MEDICINE

## 2022-06-02 VITALS
HEIGHT: 62 IN | SYSTOLIC BLOOD PRESSURE: 180 MMHG | HEART RATE: 46 BPM | DIASTOLIC BLOOD PRESSURE: 92 MMHG | BODY MASS INDEX: 31.65 KG/M2 | WEIGHT: 172 LBS

## 2022-06-02 VITALS
WEIGHT: 171 LBS | SYSTOLIC BLOOD PRESSURE: 120 MMHG | BODY MASS INDEX: 31.47 KG/M2 | HEIGHT: 62 IN | HEART RATE: 75 BPM | DIASTOLIC BLOOD PRESSURE: 80 MMHG

## 2022-06-02 DIAGNOSIS — I21.4 NON-ST ELEVATION MYOCARDIAL INFARCTION (NSTEMI): ICD-10-CM

## 2022-06-02 DIAGNOSIS — I49.3 PVCS (PREMATURE VENTRICULAR CONTRACTIONS): ICD-10-CM

## 2022-06-02 DIAGNOSIS — R42 LIGHTHEADEDNESS: ICD-10-CM

## 2022-06-02 DIAGNOSIS — I70.0 AORTIC CALCIFICATION: ICD-10-CM

## 2022-06-02 DIAGNOSIS — I10 PRIMARY HYPERTENSION: ICD-10-CM

## 2022-06-02 DIAGNOSIS — E87.1 CHRONIC HYPONATREMIA: ICD-10-CM

## 2022-06-02 DIAGNOSIS — I25.10 CORONARY ARTERY DISEASE INVOLVING NATIVE CORONARY ARTERY OF NATIVE HEART WITHOUT ANGINA PECTORIS: ICD-10-CM

## 2022-06-02 DIAGNOSIS — R00.1 SINUS BRADYCARDIA: Primary | ICD-10-CM

## 2022-06-02 DIAGNOSIS — R53.83 OTHER FATIGUE: ICD-10-CM

## 2022-06-02 PROBLEM — H53.012 DEPRIVATION AMBLYOPIA OF LEFT EYE: Status: ACTIVE | Noted: 2021-03-18

## 2022-06-02 PROBLEM — R79.83 HOMOCYSTINEMIA: Status: ACTIVE | Noted: 2021-03-25

## 2022-06-02 PROBLEM — Z91.89 AT RISK OF DISEASE: Status: ACTIVE | Noted: 2019-08-20

## 2022-06-02 PROBLEM — I82.409 ACUTE DEEP VEIN THROMBOSIS (DVT): Status: ACTIVE | Noted: 2022-06-02

## 2022-06-02 PROBLEM — E83.52 HYPERCALCEMIA: Status: RESOLVED | Noted: 2022-05-30 | Resolved: 2022-06-02

## 2022-06-02 PROBLEM — E55.9 VITAMIN D DEFICIENCY: Status: ACTIVE | Noted: 2022-05-30

## 2022-06-02 PROBLEM — M51.369 LUMBAR DEGENERATIVE DISC DISEASE: Status: ACTIVE | Noted: 2022-03-06

## 2022-06-02 PROBLEM — Z98.41 CATARACT EXTRACTION STATUS OF RIGHT EYE: Status: ACTIVE | Noted: 2021-03-25

## 2022-06-02 PROBLEM — Z98.42 CATARACT EXTRACTION STATUS OF LEFT EYE: Status: RESOLVED | Noted: 2021-04-29 | Resolved: 2022-06-02

## 2022-06-02 PROBLEM — Z86.718 HISTORY OF DVT (DEEP VEIN THROMBOSIS): Status: ACTIVE | Noted: 2022-04-04

## 2022-06-02 PROBLEM — M79.2 NEUROPATHIC PAIN: Status: RESOLVED | Noted: 2022-03-06 | Resolved: 2022-06-02

## 2022-06-02 PROBLEM — I21.9 MYOCARDIAL INFARCTION (HCC): Status: ACTIVE | Noted: 2022-06-02

## 2022-06-02 PROBLEM — I35.1 NONRHEUMATIC AORTIC VALVE INSUFFICIENCY: Status: RESOLVED | Noted: 2021-05-26 | Resolved: 2022-06-02

## 2022-06-02 PROBLEM — I82.90 VENOUS THROMBOSIS: Status: ACTIVE | Noted: 2021-03-25

## 2022-06-02 PROBLEM — M19.079 ARTHRITIS OF FOOT: Status: ACTIVE | Noted: 2021-03-02

## 2022-06-02 PROBLEM — I82.409 ACUTE DEEP VEIN THROMBOSIS (DVT): Status: RESOLVED | Noted: 2022-06-02 | Resolved: 2022-06-02

## 2022-06-02 PROBLEM — Z96.1 PRESENCE OF INTRAOCULAR LENS: Status: ACTIVE | Noted: 2021-03-18

## 2022-06-02 PROBLEM — J30.2 SEASONAL ALLERGIES: Status: RESOLVED | Noted: 2019-08-20 | Resolved: 2022-06-02

## 2022-06-02 PROBLEM — R79.83 HOMOCYSTINEMIA: Status: RESOLVED | Noted: 2021-03-25 | Resolved: 2022-06-02

## 2022-06-02 PROBLEM — F32.A DEPRESSION: Status: ACTIVE | Noted: 2022-03-06

## 2022-06-02 PROBLEM — K20.90 ESOPHAGITIS: Status: RESOLVED | Noted: 2022-01-09 | Resolved: 2022-06-02

## 2022-06-02 PROBLEM — Z91.89 AT RISK OF DISEASE: Status: RESOLVED | Noted: 2019-08-20 | Resolved: 2022-06-02

## 2022-06-02 PROBLEM — Z91.89 AT RISK FOR BONE DENSITY LOSS: Status: RESOLVED | Noted: 2019-08-20 | Resolved: 2022-06-02

## 2022-06-02 PROBLEM — Z98.890 OTHER SPECIFIED POSTPROCEDURAL STATES: Status: ACTIVE | Noted: 2021-03-18

## 2022-06-02 PROBLEM — R91.1 SOLITARY PULMONARY NODULE: Status: ACTIVE | Noted: 2022-06-02

## 2022-06-02 PROBLEM — I51.89 MYOCARDIAL DYSFUNCTION: Status: RESOLVED | Noted: 2021-03-18 | Resolved: 2022-06-02

## 2022-06-02 PROBLEM — M19.079 ARTHRITIS OF FOOT: Status: RESOLVED | Noted: 2021-03-02 | Resolved: 2022-06-02

## 2022-06-02 PROBLEM — K20.90 ESOPHAGITIS: Status: ACTIVE | Noted: 2022-01-09

## 2022-06-02 PROBLEM — I51.89 MYOCARDIAL DYSFUNCTION: Status: ACTIVE | Noted: 2021-03-18

## 2022-06-02 PROBLEM — R10.31 RIGHT LOWER QUADRANT PAIN: Status: RESOLVED | Noted: 2018-10-15 | Resolved: 2022-06-02

## 2022-06-02 PROBLEM — Z78.0 POSTMENOPAUSAL: Status: RESOLVED | Noted: 2019-08-20 | Resolved: 2022-06-02

## 2022-06-02 PROBLEM — R68.89 INFLUENZA-LIKE SYMPTOMS: Status: ACTIVE | Noted: 2018-12-28

## 2022-06-02 PROBLEM — I82.90 VENOUS THROMBOSIS: Status: ACTIVE | Noted: 2022-06-02

## 2022-06-02 PROBLEM — E72.11 HOMOCYSTINEMIA: Status: RESOLVED | Noted: 2021-03-25 | Resolved: 2022-06-02

## 2022-06-02 PROBLEM — E66.9 OBESITY WITH BODY MASS INDEX 30 OR GREATER: Status: ACTIVE | Noted: 2018-07-23

## 2022-06-02 PROBLEM — Z78.0 POSTMENOPAUSAL STATE: Status: ACTIVE | Noted: 2019-08-20

## 2022-06-02 PROBLEM — R11.0 NAUSEA: Status: RESOLVED | Noted: 2018-10-15 | Resolved: 2022-06-02

## 2022-06-02 PROBLEM — R68.89 INFLUENZA-LIKE SYMPTOMS: Status: RESOLVED | Noted: 2018-12-28 | Resolved: 2022-06-02

## 2022-06-02 PROBLEM — R93.7 ABNORMAL BONE DENSITY SCREENING: Status: ACTIVE | Noted: 2018-10-11

## 2022-06-02 PROBLEM — R10.31 RIGHT LOWER QUADRANT PAIN: Status: ACTIVE | Noted: 2018-10-15

## 2022-06-02 PROBLEM — Z98.890 OTHER SPECIFIED POSTPROCEDURAL STATES: Status: RESOLVED | Noted: 2021-03-18 | Resolved: 2022-06-02

## 2022-06-02 PROBLEM — M48.07 FORAMINAL STENOSIS OF LUMBOSACRAL REGION: Status: ACTIVE | Noted: 2022-03-06

## 2022-06-02 PROBLEM — I82.90 VENOUS THROMBOSIS: Status: RESOLVED | Noted: 2022-06-02 | Resolved: 2022-06-02

## 2022-06-02 PROBLEM — Z98.41 CATARACT EXTRACTION STATUS OF RIGHT EYE: Status: RESOLVED | Noted: 2021-03-25 | Resolved: 2022-06-02

## 2022-06-02 PROBLEM — Z23 ENCOUNTER FOR IMMUNIZATION: Status: ACTIVE | Noted: 2018-10-15

## 2022-06-02 PROBLEM — G89.29 CHRONIC BACK PAIN: Status: ACTIVE | Noted: 2022-05-31

## 2022-06-02 PROBLEM — E66.9 OBESITY WITH BODY MASS INDEX 30 OR GREATER: Status: RESOLVED | Noted: 2018-07-23 | Resolved: 2022-06-02

## 2022-06-02 PROBLEM — Z23 ENCOUNTER FOR IMMUNIZATION: Status: RESOLVED | Noted: 2018-10-15 | Resolved: 2022-06-02

## 2022-06-02 PROBLEM — Z78.0 POSTMENOPAUSAL STATE: Status: RESOLVED | Noted: 2019-08-20 | Resolved: 2022-06-02

## 2022-06-02 PROBLEM — U07.1 COVID-19: Status: ACTIVE | Noted: 2020-01-06

## 2022-06-02 PROBLEM — Z96.1 PRESENCE OF INTRAOCULAR LENS: Status: RESOLVED | Noted: 2021-03-18 | Resolved: 2022-06-02

## 2022-06-02 PROBLEM — Z98.42 CATARACT EXTRACTION STATUS OF LEFT EYE: Status: ACTIVE | Noted: 2021-04-29

## 2022-06-02 PROBLEM — Z12.31 VISIT FOR SCREENING MAMMOGRAM: Status: RESOLVED | Noted: 2019-08-20 | Resolved: 2022-06-02

## 2022-06-02 PROBLEM — M51.36 LUMBAR DEGENERATIVE DISC DISEASE: Status: ACTIVE | Noted: 2022-03-06

## 2022-06-02 PROBLEM — E55.9 VITAMIN D DEFICIENCY: Status: RESOLVED | Noted: 2022-05-30 | Resolved: 2022-06-02

## 2022-06-02 PROBLEM — E83.52 HYPERCALCEMIA: Status: ACTIVE | Noted: 2022-05-30

## 2022-06-02 PROBLEM — E72.11 HOMOCYSTINEMIA (HCC): Status: ACTIVE | Noted: 2021-03-25

## 2022-06-02 PROBLEM — J01.90 ACUTE SINUSITIS: Status: RESOLVED | Noted: 2018-12-28 | Resolved: 2022-06-02

## 2022-06-02 PROBLEM — R11.0 NAUSEA: Status: ACTIVE | Noted: 2018-10-15

## 2022-06-02 PROBLEM — J01.90 ACUTE SINUSITIS: Status: ACTIVE | Noted: 2018-12-28

## 2022-06-02 PROBLEM — M79.2 NEUROPATHIC PAIN: Status: ACTIVE | Noted: 2022-03-06

## 2022-06-02 PROBLEM — M54.9 CHRONIC BACK PAIN: Status: ACTIVE | Noted: 2022-05-31

## 2022-06-02 PROBLEM — I82.90 VENOUS THROMBOSIS: Status: RESOLVED | Noted: 2021-03-25 | Resolved: 2022-06-02

## 2022-06-02 PROBLEM — R93.7 ABNORMAL BONE DENSITY SCREENING: Status: RESOLVED | Noted: 2018-10-11 | Resolved: 2022-06-02

## 2022-06-02 LAB
AORTIC ARCH: 2.2 CM
ASCENDING AORTA: 3.7 CM
BH CV ECHO MEAS - ACS: 1.47 CM
BH CV ECHO MEAS - AI P1/2T: 858.3 MSEC
BH CV ECHO MEAS - AO MAX PG: 9 MMHG
BH CV ECHO MEAS - AO MEAN PG: 5.1 MMHG
BH CV ECHO MEAS - AO ROOT DIAM: 3.4 CM
BH CV ECHO MEAS - AO V2 MAX: 149.6 CM/SEC
BH CV ECHO MEAS - AO V2 VTI: 37.7 CM
BH CV ECHO MEAS - AVA(I,D): 1.92 CM2
BH CV ECHO MEAS - EDV(CUBED): 162.9 ML
BH CV ECHO MEAS - EDV(MOD-SP2): 93 ML
BH CV ECHO MEAS - EDV(MOD-SP4): 135 ML
BH CV ECHO MEAS - EF(MOD-BP): 58 %
BH CV ECHO MEAS - EF(MOD-SP2): 52.7 %
BH CV ECHO MEAS - EF(MOD-SP4): 60 %
BH CV ECHO MEAS - ESV(CUBED): 58.2 ML
BH CV ECHO MEAS - ESV(MOD-SP2): 44 ML
BH CV ECHO MEAS - ESV(MOD-SP4): 54 ML
BH CV ECHO MEAS - FS: 29 %
BH CV ECHO MEAS - IVS/LVPW: 0.89 CM
BH CV ECHO MEAS - IVSD: 1.03 CM
BH CV ECHO MEAS - LAT PEAK E' VEL: 5.2 CM/SEC
BH CV ECHO MEAS - LV DIASTOLIC VOL/BSA (35-75): 75.5 CM2
BH CV ECHO MEAS - LV MASS(C)D: 236.5 GRAMS
BH CV ECHO MEAS - LV MAX PG: 4.1 MMHG
BH CV ECHO MEAS - LV MEAN PG: 2.22 MMHG
BH CV ECHO MEAS - LV SYSTOLIC VOL/BSA (12-30): 30.2 CM2
BH CV ECHO MEAS - LV V1 MAX: 101.5 CM/SEC
BH CV ECHO MEAS - LV V1 VTI: 23.4 CM
BH CV ECHO MEAS - LVIDD: 5.5 CM
BH CV ECHO MEAS - LVIDS: 3.9 CM
BH CV ECHO MEAS - LVOT AREA: 3.1 CM2
BH CV ECHO MEAS - LVOT DIAM: 1.99 CM
BH CV ECHO MEAS - LVPWD: 1.15 CM
BH CV ECHO MEAS - MED PEAK E' VEL: 5 CM/SEC
BH CV ECHO MEAS - MR MAX PG: 114.6 MMHG
BH CV ECHO MEAS - MR MAX VEL: 535.3 CM/SEC
BH CV ECHO MEAS - MV A DUR: 0.11 SEC
BH CV ECHO MEAS - MV A MAX VEL: 97.7 CM/SEC
BH CV ECHO MEAS - MV DEC SLOPE: 253.6 CM/SEC2
BH CV ECHO MEAS - MV DEC TIME: 0.36 MSEC
BH CV ECHO MEAS - MV E MAX VEL: 60.4 CM/SEC
BH CV ECHO MEAS - MV E/A: 0.62
BH CV ECHO MEAS - MV MAX PG: 4.9 MMHG
BH CV ECHO MEAS - MV MEAN PG: 0.74 MMHG
BH CV ECHO MEAS - MV P1/2T: 63.5 MSEC
BH CV ECHO MEAS - MV V2 VTI: 27.7 CM
BH CV ECHO MEAS - MVA(P1/2T): 3.5 CM2
BH CV ECHO MEAS - MVA(VTI): 2.6 CM2
BH CV ECHO MEAS - PA ACC TIME: 0.11 SEC
BH CV ECHO MEAS - PA PR(ACCEL): 31.5 MMHG
BH CV ECHO MEAS - PA V2 MAX: 108.6 CM/SEC
BH CV ECHO MEAS - PULM A REVS DUR: 0.13 SEC
BH CV ECHO MEAS - PULM A REVS VEL: 57.8 CM/SEC
BH CV ECHO MEAS - PULM DIAS VEL: 39 CM/SEC
BH CV ECHO MEAS - PULM S/D: 1.43
BH CV ECHO MEAS - PULM SYS VEL: 55.7 CM/SEC
BH CV ECHO MEAS - QP/QS: 0.95
BH CV ECHO MEAS - RAP SYSTOLE: 3 MMHG
BH CV ECHO MEAS - RV MAX PG: 3.6 MMHG
BH CV ECHO MEAS - RV V1 MAX: 95.1 CM/SEC
BH CV ECHO MEAS - RV V1 VTI: 18.5 CM
BH CV ECHO MEAS - RVOT DIAM: 2.17 CM
BH CV ECHO MEAS - RVSP: 25 MMHG
BH CV ECHO MEAS - SI(MOD-SP2): 27.4 ML/M2
BH CV ECHO MEAS - SI(MOD-SP4): 45.3 ML/M2
BH CV ECHO MEAS - SUP REN AO DIAM: 2.7 CM
BH CV ECHO MEAS - SV(LVOT): 72.3 ML
BH CV ECHO MEAS - SV(MOD-SP2): 49 ML
BH CV ECHO MEAS - SV(MOD-SP4): 81 ML
BH CV ECHO MEAS - SV(RVOT): 68.7 ML
BH CV ECHO MEAS - TAPSE (>1.6): 2.28 CM
BH CV ECHO MEAS - TR MAX PG: 22 MMHG
BH CV ECHO MEAS - TR MAX VEL: 234.6 CM/SEC
BH CV ECHO MEASUREMENTS AVERAGE E/E' RATIO: 11.84
BH CV XLRA - RV BASE: 3.1 CM
BH CV XLRA - RV LENGTH: 6.8 CM
BH CV XLRA - RV MID: 2.31 CM
BH CV XLRA - TDI S': 14.4 CM/SEC
LEFT ATRIUM VOLUME INDEX: 32.5 ML/M2
MAXIMAL PREDICTED HEART RATE: 141 BPM
SINUS: 3.8 CM
STJ: 3.3 CM
STRESS TARGET HR: 120 BPM

## 2022-06-02 PROCEDURE — 93306 TTE W/DOPPLER COMPLETE: CPT | Performed by: INTERNAL MEDICINE

## 2022-06-02 PROCEDURE — 93306 TTE W/DOPPLER COMPLETE: CPT

## 2022-06-02 PROCEDURE — 99214 OFFICE O/P EST MOD 30 MIN: CPT | Performed by: NURSE PRACTITIONER

## 2022-06-02 PROCEDURE — 93000 ELECTROCARDIOGRAM COMPLETE: CPT | Performed by: NURSE PRACTITIONER

## 2022-06-02 RX ORDER — LANOLIN ALCOHOL/MO/W.PET/CERES
1 CREAM (GRAM) TOPICAL DAILY
COMMUNITY

## 2022-06-02 RX ORDER — ASPIRIN 81 MG/1
81 TABLET ORAL DAILY
COMMUNITY

## 2022-06-02 NOTE — PROGRESS NOTES
Date of Office Visit: 2022  Encounter Provider: YOHANNES Green  Place of Service: [facility] CARDIOLOGY  Patient Name: Diana Bianchi  :1940  Primary Cardiologist: Dr. Vincenzo Hudson     Chief Complaint   Patient presents with   • Hypertension   • Slow Heart Rate   :     HPI: Diana Bianchi is a pleasant 81 y.o. female who presents today for cardiac follow-up visit.  I reviewed her medical records.    In , she suffered a myocardial infarction and was treated with tPA in [place].  In  and again in , she had a cardiac catheterization which did not require any intervention.  She has a longstanding history of hypertension.    In , she saw Dr. Hudson to establish care.  EKG was abnormal and Myoview stress test was normal.    In , she was noted to be bradycardic.  Holter monitor showed normal sinus rhythm with average heart rate of 61 bpm and a 20% burden of monomorphic PVCs.  Treadmill stress test was normal.    In 2021, she had the COVID-19 virus.  She was experiencing palpitations and wore a Holter monitor which showed normal sinus rhythm, average heart rate 59, minimum 38, and 18% PVC burden.    In May 2021, she was noted to have a heart murmur.  Echo showed normal LVEF, mild LVH, grade 1 diastolic dysfunction, left atrium moderately dilated, moderate aortic valve calcification, and trace to mild aortic valve regurgitation.    In 2021, she followed up with Dr. Hudson.  She was noted to have chronic hyponatremia with a sodium level of 128-132.  Dr. Hudson spoke to her PCP and decided to restart Amoride with a goal sodium level of 130-135.    She presents today for follow-up visit.  She just had an echocardiogram completed and the results are pending.  She has some mild dyspnea with exertion which is unchanged and lower extremity edema.  She reports mild lightheadedness and fatigue this week.  Her heart rate is 46 today and blood pressure  "elevated.  She has had some intermittent left arm pain that improves with moving her arms.  She is chronic right buttock pain from \"nerve damage\".      Past Medical History:   Diagnosis Date   • Acute deep vein thrombosis (DVT) (HCC) 6/2/2022   • Aortic calcification (HCC) 5/26/2021    Moderate per echo   • ASCVD (arteriosclerotic cardiovascular disease)     MI 1991, treated in [place] with tPA.  Cath in 2005 in [place], report unavailable, but no stenting was done.   • Blood clot in vein     RIGHT FOOT   • Cataract extraction status of left eye 4/29/2021   • Cataract extraction status of right eye 3/25/2021   • Chronic hyponatremia    • Claustrophobia    • Concussion    • Depression    • Endometrial cancer (HCC)    • Esophagitis 1/9/2022   • GERD (gastroesophageal reflux disease)    • Hypertension    • Limited mobility     RIGHT FOOT/RIGHT ANKLE   • OA (osteoarthritis)     RIGHT FOOT   • MICHAELLE (obstructive sleep apnea)     STATES \"BORDERLINE\", AWAITING FOR ANOTHER TEST . NO MACHINE USE   • PONV (postoperative nausea and vomiting)     N/V    • Presence of intraocular lens 3/18/2021   • PVCs (premature ventricular contractions) 9/17/2020   • Right foot pain    • Seasonal allergies        Past Surgical History:   Procedure Laterality Date   • ANKLE FUSION Right 6/28/2021    Procedure: RIGHT TRIPLE ARTHRODESIS 1ST TARSOMETATARSAL JOINT ARTHRODESIS ACHILLES LENGTHENING;  Surgeon: Lyle Rm Jr., MD;  [place] [facility] [place] [facility] [place]  Service: Orthopedics;  Laterality: Right;   • CATARACT EXTRACTION WITH INTRAOCULAR LENS IMPLANT Bilateral    • COLONOSCOPY     • ENDOSCOPY     • FOOT SURGERY Left     METAL AND PINS IN LEFT FOOT   • HYSTERECTOMY     • KNEE ARTHROPLASTY Bilateral    • KNEE SURGERY     • LUMBAR FUSION      3-4,4-5   • OOPHORECTOMY Bilateral    • TONSILLECTOMY         Social History     Socioeconomic History   • Marital status: Single   Tobacco Use   • Smoking status: Never Smoker   • Smokeless tobacco: Never Used "   • Tobacco comment: caffeine use/ 3 CUPS DAILY    Vaping Use   • Vaping Use: Never used   Substance and Sexual Activity   • Alcohol use: No   • Drug use: No   • Sexual activity: Not Currently     Partners: Male       Family History   Problem Relation Age of Onset   • Heart disease Mother    • Heart disease Father    • Heart attack Father    • Bone cancer Sister    • Breast cancer Sister    • Heart disease Brother          age 47   • Heart failure Brother    • Stroke Paternal Grandfather    • Hypertension Other    • Heart attack Maternal Grandmother    • Heart attack Maternal Uncle    • Heart attack Maternal Uncle    • Hypertension Brother    • Malig Hyperthermia Neg Hx        The following portion of the patient's history were reviewed and updated as appropriate: past medical history, past surgical history, past social history, past family history, allergies, current medications, and problem list.    Review of Systems   Constitutional: Negative.   Cardiovascular: Positive for dyspnea on exertion and leg swelling.   Respiratory: Negative.    Hematologic/Lymphatic: Negative.    Neurological: Positive for dizziness and light-headedness.       Allergies   Allergen Reactions   • Codeine Anaphylaxis   • Fentanyl Shortness Of Breath   • Hydrocodone-Acetaminophen Shortness Of Breath   • Morphine And Related Anaphylaxis, Nausea And Vomiting, Other (See Comments) and Shortness Of Breath   • Oxycodone-Acetaminophen Shortness Of Breath   • Peanut (Diagnostic) Anaphylaxis   • Peanut Oil Shortness Of Breath   • Peanut-Containing Drug Products Anaphylaxis   • Statins Other (See Comments) and Shortness Of Breath         • Zithromax [Azithromycin] Anaphylaxis   • Acid Blockers Support Other (See Comments)   • Prednisone Unknown - Low Severity and Other (See Comments)         Current Outpatient Medications:   •  acetaminophen (TYLENOL) 500 MG tablet, Take 500 mg by mouth Every 6 (Six) Hours As Needed for Mild Pain ., Disp: ,  "Rfl:   •  aMILoride (MIDAMOR) 5 MG tablet, TAKE ONE TABLET BY MOUTH DAILY, Disp: 90 tablet, Rfl: 0  •  amLODIPine (NORVASC) 2.5 MG tablet, Take 1 tablet by mouth Daily., Disp: 90 tablet, Rfl: 3  •  aspirin 81 MG EC tablet, Take 81 mg by mouth Daily., Disp: , Rfl:   •  Calcium-Magnesium-Vitamin D (CALCIUM MAGNESIUM PO), Take 1 tablet by mouth Every Night. HOLD PRIOR TO OR, Disp: , Rfl:   •  co-enzyme Q-10 30 MG capsule, Take 1 tablet by mouth., Disp: , Rfl:   •  famotidine (Pepcid) 20 MG tablet, Take 1 tablet by mouth 2 (Two) Times a Day., Disp: 180 tablet, Rfl: 2  •  fexofenadine (ALLEGRA) 180 MG tablet, Take 180 mg by mouth Daily., Disp: , Rfl:   •  fluticasone (FLONASE) 50 MCG/ACT nasal spray, 2 sprays into the nostril(s) as directed by provider 2 (two) times a day. A, Disp: 16 g, Rfl: 10  •  folic acid (FOLVITE) 400 MCG tablet, Take 1 tablet by mouth Daily., Disp: , Rfl:   •  losartan (COZAAR) 50 MG tablet, TAKE ONE TABLET BY MOUTH DAILY, Disp: 90 tablet, Rfl: 3  •  multivitamin with minerals tablet tablet, Take 1 tablet by mouth Daily. HOLD FOR SURGERY, Disp: , Rfl:         Objective:     Vitals:    06/02/22 1427 06/02/22 1443 06/02/22 1501   BP: (!) 182/92 180/88 180/92   BP Location: Left arm Right arm Left arm   Patient Position:   Sitting   Pulse: (!) 46     Weight: 78 kg (172 lb)     Height: 157.5 cm (62\")       Body mass index is 31.46 kg/m².    PHYSICAL EXAM:    Vitals Reviewed.   General Appearance: No acute distress, well developed and well nourished. Obese.   Eyes: Conjunctiva and lids: No erythema, swelling, or discharge. Sclera non-icteric. Glasses.   HENT: Atraumatic, normocephalic. External eyes, ears, and nose normal. No hearing loss noted. Mucous membranes normal. Lips not cyanotic. Neck supple with no tenderness. Wearing mask.   Respiratory: No signs of respiratory distress. Respiration rhythm and depth normal.   Clear to auscultation. No rales, crackles, rhonchi, or wheezing auscultated. "   Cardiovascular:  Jugular Venous Pressure: Normal  Heart Rate and Rhythm: Bradycardic.  Heart Sounds: Normal S1 and S2. No S3 or S4 noted.  Murmurs: RUSB grade 2/6 murmur present.  No rubs, thrills, or gallops.   Lower Extremities: Trace edema noted; left greater than right.  Wearing left leg brace.  Gastrointestinal:  Abdomen soft, non-distended, non-tender.    Musculoskeletal: Normal movement of extremities.  Skin and Nails: General appearance normal. No pallor, cyanosis, diaphoresis. Skin temperature normal. No clubbing of fingernails.   Psychiatric: Patient alert and oriented to person, place, and time. Speech and behavior appropriate. Normal mood and affect.       ECG 12 Lead    Date/Time: 6/2/2022 2:40 PM  Performed by: Esther Valdivia APRN  Authorized by: Esther Valdivia APRN   Comparison: compared with previous ECG from 12/1/2021  Similar to previous ECG  Rhythm: sinus bradycardia  Rate: normal  BPM: 46  Conduction: non-specific intraventricular conduction delay  ST Segments: ST segments normal  T Waves: T waves normal  QRS axis: normal    Clinical impression: abnormal EKG              Assessment:       Diagnosis Plan   1. Sinus bradycardia  Holter Monitor - 24 Hour   2. Lightheadedness  Holter Monitor - 24 Hour   3. Other fatigue  Holter Monitor - 24 Hour   4. PVCs (premature ventricular contractions)     5. Primary hypertension     6. Chronic hyponatremia     7. Non-ST elevation myocardial infarction (NSTEMI) ([facility])     8. Aortic calcification ([facility])            Plan:       1/2/3.  Sinus Bradycardia: She has been experiencing lightheadedness and more fatigued this week.  Today her heart rate is 46.  She is not on any rate lowering medications.  I recommended 24-hour Holter monitor for reassessment of her heart rate.    4.  PVC: Reassess PVC burden on Holter monitor.  Asymptomatic.    5.  Hypertension: Blood pressure quite elevated today.  She is going to start monitoring her blood pressure at home and  call me with an update in a week or 2.  We may need to increase her amlodipine and watch her lower extremity edema.    6.  Hyponatremia: She remains on amiloride with a sodium level of 130-135 recommended.    7.  History of non-STEMI.  Denies angina.    8.  Aortic Calcification: Echocardiogram completed today is still pending.  I will call her with results.  Murmur present.    9.  Further recommendations to follow.    ADDENDUM:    Echocardiogram results:  Normal LVEF  Mild LVH  Grade 1 diastolic dysfunction  Moderate aortic valve calcification  Moderate mitral valve calcification  Trace to mild mitral regurgitation    · Dr. Hudson reviewed and said she is very happy with the echocardiogram results.  · Patient informed & verbalizes understanding about echo results. Her BP is running 137-151/74.   · I recommended increasing amlodipine to 5 mg daily. She will update me in a few weeks.   · Patient informed & verbalizes understanding.  · She will update me in a few weeks.       As always, it has been a pleasure to participate in your patient's care. Thank you.       Sincerely,         YOHANNES Garcia  [facility] Cardiology      · Dictated utilizing Dragon Dictation  · COVID-19 Precautions - Patient was compliant in wearing a mask. When I saw the patient, I used appropriate personal protective equipment (PPE) including mask and eye shield (standard procedure).  Additionally, I used gown and gloves if indicated.  Hand hygiene was completed before and after seeing the patient.  · I spent 30 minutes reviewing her medical records/testing/previous office notes/labs, face-to-face interaction with patient, physical examination, formulating the plan of care, and discussion of plan of care with patient.

## 2022-06-03 ENCOUNTER — TELEPHONE (OUTPATIENT)
Dept: CARDIOLOGY | Facility: CLINIC | Age: 82
End: 2022-06-03

## 2022-06-03 NOTE — TELEPHONE ENCOUNTER
Echocardiogram results:    Normal LVEF  Mild LVH  Grade 1 diastolic dysfunction  Moderate aortic valve calcification  Moderate mitral valve calcification  Trace to mild mitral regurgitation    Dr. Hudson reviewed and said she is very happy with the echocardiogram results.  I left a message and told patient we will try calling her back next week.  I will also call her in a couple of weeks for reassessment of her home blood pressure readings.

## 2022-06-06 RX ORDER — AMLODIPINE BESYLATE 2.5 MG/1
5 TABLET ORAL DAILY
Qty: 90 TABLET | Refills: 0
Start: 2022-06-06 | End: 2022-07-06 | Stop reason: SDUPTHER

## 2022-06-06 NOTE — TELEPHONE ENCOUNTER
Patient informed & verbalizes understanding about echo results. Her BP is running 137-151/74. I recommended increasing amlodipine to 5 mg daily.     She will update me in a few weeks.

## 2022-06-06 NOTE — TELEPHONE ENCOUNTER
Pt returning your missed phone call. States she will be at this number until 7pm tonight. [phone].    dd

## 2022-06-08 NOTE — TELEPHONE ENCOUNTER
Patient informed about Holter monitor results.    Please schedule a 6-month follow-up visit with Dr. Vincenzo Hudson.  Thank you.

## 2022-07-06 ENCOUNTER — TELEPHONE (OUTPATIENT)
Dept: CARDIOLOGY | Facility: CLINIC | Age: 82
End: 2022-07-06

## 2022-07-06 RX ORDER — AMLODIPINE BESYLATE 2.5 MG/1
2.5 TABLET ORAL DAILY
Qty: 90 TABLET | Refills: 0
Start: 2022-07-06 | End: 2023-02-02

## 2022-07-06 NOTE — TELEPHONE ENCOUNTER
Patient recently sent me a my chart message that she was  experiencing some dizziness.  I recommended taking amlodipine 5 mg daily to see if lower blood pressure would help her dizziness.    I called to check on her. She increased amlodipine to 5 mg daily and then her PCP decreased to 2.5 mg daily. She was diagnosed with vertigo and treated with medication.     Her BP is now running 120-130/70-80. Her dizziness has resolved.  She will keep her scheduled appointment with Dr. Hudson in December.  I asked her to call with any concerns before then.

## 2022-08-19 RX ORDER — AMILORIDE HYDROCHLORIDE 5 MG/1
TABLET ORAL
Qty: 90 TABLET | Refills: 0 | Status: SHIPPED | OUTPATIENT
Start: 2022-08-19 | End: 2022-12-14

## 2022-10-12 ENCOUNTER — PATIENT MESSAGE (OUTPATIENT)
Dept: CARDIOLOGY | Facility: CLINIC | Age: 82
End: 2022-10-12

## 2022-10-12 PROBLEM — G62.9 PERIPHERAL NEUROPATHY: Status: ACTIVE | Noted: 2022-10-12

## 2022-10-28 NOTE — TELEPHONE ENCOUNTER
I called. She has seen Dr Patten in the interim, and at her advice, she did go ahead and change from amiloride to furosemide one week ago (20mg daily). She has improved edema but she feels lightheaded. I asked her to take it every other day to see how she does with that.

## 2022-10-28 NOTE — TELEPHONE ENCOUNTER
From: Diana Bianchi  To: Vincenzo Hudson MD  Sent: 10/12/2022 2:06 PM EDT  Subject: Medication change    I just read online what both medications are for. Both water pill. He only mentioned changed because last 5 months noticed my left leg is larger than right, one I had surgery on foot. Also my sodium level if you look at blood work will see for a few years in at low end of chart of under and that is Dr. Dejesus's concern BUT I have cardiac problems and high blood pressure so I wait to hear from you and amloride HCT 5mg does help to lower BP what is the amlopide for did not look that up yet

## 2022-12-08 RX ORDER — LOSARTAN POTASSIUM 50 MG/1
TABLET ORAL
Qty: 90 TABLET | Refills: 0 | Status: SHIPPED | OUTPATIENT
Start: 2022-12-08 | End: 2023-03-20 | Stop reason: SDUPTHER

## 2022-12-14 ENCOUNTER — OFFICE VISIT (OUTPATIENT)
Dept: CARDIOLOGY | Facility: CLINIC | Age: 82
End: 2022-12-14

## 2022-12-14 VITALS
HEIGHT: 62 IN | SYSTOLIC BLOOD PRESSURE: 138 MMHG | WEIGHT: 167.2 LBS | HEART RATE: 51 BPM | DIASTOLIC BLOOD PRESSURE: 80 MMHG | BODY MASS INDEX: 30.77 KG/M2

## 2022-12-14 DIAGNOSIS — R00.1 SINUS BRADYCARDIA: ICD-10-CM

## 2022-12-14 DIAGNOSIS — I49.3 PVCS (PREMATURE VENTRICULAR CONTRACTIONS): ICD-10-CM

## 2022-12-14 DIAGNOSIS — R42 EPISODIC LIGHTHEADEDNESS: ICD-10-CM

## 2022-12-14 DIAGNOSIS — I25.10 CORONARY ARTERY DISEASE INVOLVING NATIVE CORONARY ARTERY OF NATIVE HEART WITHOUT ANGINA PECTORIS: Primary | ICD-10-CM

## 2022-12-14 DIAGNOSIS — I35.9 AORTIC VALVE CALCIFICATION: ICD-10-CM

## 2022-12-14 DIAGNOSIS — E87.1 CHRONIC HYPONATREMIA: ICD-10-CM

## 2022-12-14 DIAGNOSIS — I10 PRIMARY HYPERTENSION: ICD-10-CM

## 2022-12-14 PROBLEM — I21.9 MYOCARDIAL INFARCTION (HCC): Status: RESOLVED | Noted: 2022-06-02 | Resolved: 2022-12-14

## 2022-12-14 PROBLEM — U07.1 COVID-19: Status: RESOLVED | Noted: 2020-01-06 | Resolved: 2022-12-14

## 2022-12-14 PROBLEM — J01.10 ACUTE NON-RECURRENT FRONTAL SINUSITIS: Status: RESOLVED | Noted: 2021-02-18 | Resolved: 2022-12-14

## 2022-12-14 PROCEDURE — 99214 OFFICE O/P EST MOD 30 MIN: CPT | Performed by: INTERNAL MEDICINE

## 2022-12-14 PROCEDURE — 93000 ELECTROCARDIOGRAM COMPLETE: CPT | Performed by: INTERNAL MEDICINE

## 2022-12-14 RX ORDER — FUROSEMIDE 20 MG/1
20 TABLET ORAL EVERY OTHER DAY
COMMUNITY
Start: 2022-09-29

## 2022-12-14 NOTE — PROGRESS NOTES
Date of Office Visit: 2022  Encounter Provider: Vincenzo Hudson MD  Place of Service: [facility] CARDIOLOGY  Patient Name: Diana Bianchi  :1940    Chief Complaint   Patient presents with   • Coronary Artery Disease   :     HPI: Diana Bianchi is a 82 y.o. female who presents today to follow up. I have reviewed prior notes and there are no changes except for any new updates described below. I have also reviewed any information entered into the medical record by the patient or by ancillary staff.      She suffered a myocardial infarction in  while living in [place] and was treated with TPA. She moved to [place] in  and had a cardiac catheterization in . She did not have any type of intervention at that time, and has been treated with medical therapy.  She also has a longstanding hypertension.     In , I noted some lateral T wave inversions; I ordered a Myoview stress which was normal.     In , I noted that she had sinus bradycardia.  A Holter showed good heart rate variability with an average rate of 61 bpm. It did show a 20% burden of monomorphic PVCs.  She walked on a modified Omer treadmill for nine minutes with good HR response. A follow up Holter in  was unchanged. In May 2021, she was noted to have a systolic murmur; an echo revealed normal LVSF, and aortic valve calcification without stenosis.    She has had chronic hyponatremia, Na 128-132, while on amiloride-HCTZ. Recently, her Na dropped to 125 and the thiazides were stopped. She then reported that her BP went up and she had leg swelling.Her Na ora to 139.  I spoke with Dr Patten, and we agreed to restart amiloride, with a goal Na of 130-135.  She was then referred to nephrology (Dr Dejesus) and amiloride was discontinued. Amlodipine was added.     She has generally done very well. She denies chest pain, dyspnea, orthopnea, palpitations, or syncope. She reports episodes of dizziness;  "these are episodes of lightheadedness that can occur while up moving or even while seated.     Past Medical History:   Diagnosis Date   • Acute deep vein thrombosis (DVT) (HCC) 6/2/2022   • Aortic calcification (HCC) 5/26/2021    Moderate per echo   • ASCVD (arteriosclerotic cardiovascular disease)     MI 1991, treated in [place] with tPA.  Cath in 2005 in [place], report unavailable, but no stenting was done.   • Blood clot in vein     RIGHT FOOT   • Cataract extraction status of left eye 4/29/2021   • Cataract extraction status of right eye 3/25/2021   • Chronic hyponatremia    • Claustrophobia    • Concussion    • Depression    • Endometrial cancer (HCC)    • Esophagitis 1/9/2022   • GERD (gastroesophageal reflux disease)    • Hypertension    • Limited mobility     RIGHT FOOT/RIGHT ANKLE   • OA (osteoarthritis)     RIGHT FOOT   • MICHAELLE (obstructive sleep apnea)     STATES \"BORDERLINE\", AWAITING FOR ANOTHER TEST . NO MACHINE USE   • Peripheral neuropathy 10/12/2022   • PONV (postoperative nausea and vomiting)     N/V    • Presence of intraocular lens 3/18/2021   • PVCs (premature ventricular contractions) 9/17/2020   • Right foot pain    • Seasonal allergies        Past Surgical History:   Procedure Laterality Date   • ANKLE FUSION Right 6/28/2021    Procedure: RIGHT TRIPLE ARTHRODESIS 1ST TARSOMETATARSAL JOINT ARTHRODESIS ACHILLES LENGTHENING;  Surgeon: Lyle Rm Jr., MD;  Location: [facility] OR [facility];  Service: Orthopedics;  Laterality: Right;   • CATARACT EXTRACTION WITH INTRAOCULAR LENS IMPLANT Bilateral    • COLONOSCOPY     • ENDOSCOPY     • FOOT SURGERY Left     METAL AND PINS IN LEFT FOOT   • HYSTERECTOMY     • KNEE ARTHROPLASTY Bilateral    • KNEE SURGERY     • LUMBAR FUSION      3-4,4-5   • OOPHORECTOMY Bilateral    • TONSILLECTOMY         Social History     Socioeconomic History   • Marital status: Single   Tobacco Use   • Smoking status: Never   • Smokeless tobacco: Never   • Tobacco comments:     " caffeine use/ 3 CUPS DAILY    Vaping Use   • Vaping Use: Never used   Substance and Sexual Activity   • Alcohol use: No   • Drug use: No   • Sexual activity: Not Currently     Partners: Male       Family History   Problem Relation Age of Onset   • Heart disease Mother    • Heart disease Father    • Heart attack Father    • Bone cancer Sister    • Breast cancer Sister    • Heart disease Brother          age 47   • Heart failure Brother    • Stroke Paternal Grandfather    • Hypertension Other    • Heart attack Maternal Grandmother    • Heart attack Maternal Uncle    • Heart attack Maternal Uncle    • Hypertension Brother    • Malig Hyperthermia Neg Hx        Review of Systems   Unable to perform ROS  Constitutional: Positive for malaise/fatigue.   Cardiovascular: Negative for chest pain and palpitations.   Respiratory: Negative for shortness of breath.    Musculoskeletal: Positive for myalgias.   Neurological: Negative for light-headedness.   All other systems reviewed and are negative.      Allergies   Allergen Reactions   • Clavulanic Acid Shortness Of Breath     Ok with amoxicillin  Did not tolerate clavulanate.   Ok with amoxicillin  Did not tolerate clavulanate.      • Codeine Anaphylaxis   • Cortisone Other (See Comments) and Shortness Of Breath   • Fentanyl Shortness Of Breath   • Hydrocodone-Acetaminophen Shortness Of Breath   • Morphine And Related Anaphylaxis, Nausea And Vomiting, Other (See Comments) and Shortness Of Breath   • Oxycodone-Acetaminophen Shortness Of Breath   • Peanut (Diagnostic) Anaphylaxis   • Peanut Oil Shortness Of Breath   • Peanut-Containing Drug Products Anaphylaxis   • Statins Other (See Comments) and Shortness Of Breath         • Zithromax [Azithromycin] Anaphylaxis   • Acid Blockers Support Other (See Comments)   • Prednisone Unknown - Low Severity and Other (See Comments)         Current Outpatient Medications:   •  acetaminophen (TYLENOL) 500 MG tablet, Take 500 mg by mouth  "Every 6 (Six) Hours As Needed for Mild Pain ., Disp: , Rfl:   •  amLODIPine (NORVASC) 2.5 MG tablet, Take 1 tablet by mouth Daily., Disp: 90 tablet, Rfl: 0  •  aspirin 81 MG EC tablet, Take 81 mg by mouth Daily., Disp: , Rfl:   •  Calcium-Magnesium-Vitamin D (CALCIUM MAGNESIUM PO), Take 1 tablet by mouth Every Night. HOLD PRIOR TO OR, Disp: , Rfl:   •  co-enzyme Q-10 30 MG capsule, Take 1 tablet by mouth., Disp: , Rfl:   •  famotidine (Pepcid) 20 MG tablet, Take 1 tablet by mouth 2 (Two) Times a Day., Disp: 180 tablet, Rfl: 2  •  fexofenadine (ALLEGRA) 180 MG tablet, Take 180 mg by mouth Daily., Disp: , Rfl:   •  furosemide (LASIX) 20 MG tablet, Take 20 mg by mouth Every Other Day., Disp: , Rfl:   •  fluticasone (FLONASE) 50 MCG/ACT nasal spray, 2 sprays into the nostril(s) as directed by provider 2 (two) times a day. A, Disp: 16 g, Rfl: 10  •  folic acid (FOLVITE) 400 MCG tablet, Take 1 tablet by mouth Daily., Disp: , Rfl:   •  losartan (COZAAR) 50 MG tablet, TAKE ONE TABLET BY MOUTH DAILY, Disp: 90 tablet, Rfl: 0  •  multivitamin with minerals tablet tablet, Take 1 tablet by mouth Daily. HOLD FOR SURGERY, Disp: , Rfl:      Objective:     Vitals:    12/14/22 1309   BP: 160/80   BP Location: Left arm   Pulse: 51   Weight: 75.8 kg (167 lb 3.2 oz)   Height: 157.5 cm (62\")     Body mass index is 30.58 kg/m².    Physical Exam  Vitals reviewed.   Constitutional:       Appearance: She is well-developed.   HENT:      Head: Normocephalic.      Nose: Nose normal.      Mouth/Throat:      Comments: Masked  Eyes:      Conjunctiva/sclera: Conjunctivae normal.   Neck:      Vascular: No JVD.   Cardiovascular:      Rate and Rhythm: Regular rhythm. Bradycardia present.      Pulses: Intact distal pulses.      Heart sounds: Murmur heard.    Systolic murmur is present with a grade of 2/6.  Pulmonary:      Effort: Pulmonary effort is normal.      Breath sounds: Normal breath sounds.   Abdominal:      Palpations: Abdomen is soft.      " Tenderness: There is no abdominal tenderness.   Musculoskeletal:         General: Normal range of motion.      Cervical back: Normal range of motion.   Skin:     General: Skin is warm and dry.      Findings: No rash.   Neurological:      General: No focal deficit present.      Mental Status: She is alert and oriented to person, place, and time.      Cranial Nerves: No cranial nerve deficit.   Psychiatric:         Mood and Affect: Mood normal.         ECG 12 Lead    Date/Time: 12/14/2022 1:30 PM  Performed by: Vincenzo Hudson MD  Authorized by: Vincenoz Hudson MD   Comparison: compared with previous ECG   Similar to previous ECG  Rhythm: sinus bradycardia  Conduction: conduction normal  ST Segments: ST segments normal  T Waves: T waves normal  QRS axis: normal  Other findings: left ventricular hypertrophy    Clinical impression: abnormal EKG            Assessment:       Diagnosis Plan   1. Coronary artery disease involving native coronary artery of native heart without angina pectoris  ECG 12 Lead      2. Primary hypertension        3. Chronic hyponatremia        4. Sinus bradycardia        5. PVCs (premature ventricular contractions)        6. Episodic lightheadedness          Plan:     1.  Coronary Artery Disease  Assessment  • The patient has no angina    Subjective - Objective  • There is a history of past MI 1991  • Current antiplatelet therapy includes aspirin 81 mg    She is intolerant of statins.  A Myoview was normal in 2017.    2-6.  Her BP is a bit high today but she reports episodes of lightheadedness. She cannot take AVN blockers due to low resting heart rate. She was on diuretics but developed hyponatremia.  I also rechecked her BP and got 138/80mm Hg.     I asked her to check her BP and HR when she is feeling lightheaded and to report back to me.     Her sinus rate typically runs in the low 50s.  She has asymptomatic monomorphic PVCs.  Because of her rate, I can't add a beta blocker. However, as her  burden is <20%, I'm not too worried.     7. She had AoV calcification without stenosis in June 2022.     Sincerely,       Vincenzo Hudson MD

## 2022-12-15 ENCOUNTER — PATIENT MESSAGE (OUTPATIENT)
Dept: CARDIOLOGY | Facility: CLINIC | Age: 82
End: 2022-12-15

## 2022-12-16 NOTE — TELEPHONE ENCOUNTER
From: Diana Bianchi  To: Vincenzo Hudson MD  Sent: 12/15/2022 11:15 PM EST  Subject: Medication change    You suggested taking 20mg Lasix pill every other day which I have been doing. I wanted to ask due to the effect of the pill would it be possible to take 10mg everyday and see how that goes> And if so is it possible to have it called in to pharmacy rather than me cutting l pill in half. thank you

## 2023-02-01 ENCOUNTER — PATIENT MESSAGE (OUTPATIENT)
Dept: CARDIOLOGY | Facility: CLINIC | Age: 83
End: 2023-02-01
Payer: MEDICARE

## 2023-02-02 ENCOUNTER — TELEPHONE (OUTPATIENT)
Dept: CARDIOLOGY | Facility: CLINIC | Age: 83
End: 2023-02-02
Payer: MEDICARE

## 2023-02-02 RX ORDER — AMLODIPINE BESYLATE 2.5 MG/1
TABLET ORAL
Qty: 90 TABLET | Refills: 0 | Status: SHIPPED | OUTPATIENT
Start: 2023-02-02

## 2023-02-02 NOTE — TELEPHONE ENCOUNTER
Please see how she's doing since she sent this yesterday. Can she come in for an EKG tomorrow?    efraín woodruff

## 2023-02-02 NOTE — TELEPHONE ENCOUNTER
Called and left VM. Will continue to try to reach patient.     Lisa Redd RN  [place]riage [facility]

## 2023-02-03 NOTE — TELEPHONE ENCOUNTER
Spoke to patient. Her HR yesterday was running in the 60s-70s. This AM it is 53. She said she is still fatigued, however she is having sinus problems and her PCP has prescribed her an antibiotic for that. She is wanting to see if the antibiotic will help her. She will call back if she has any further problems. I told her I would call her back if you had anything else to add.     Lisa Redd RN  Triage [facility]

## 2023-03-20 RX ORDER — LOSARTAN POTASSIUM 50 MG/1
50 TABLET ORAL DAILY
Qty: 90 TABLET | Refills: 3 | Status: SHIPPED | OUTPATIENT
Start: 2023-03-20

## 2023-05-08 RX ORDER — AMLODIPINE BESYLATE 2.5 MG/1
TABLET ORAL
Qty: 90 TABLET | Refills: 1 | Status: SHIPPED | OUTPATIENT
Start: 2023-05-08

## 2023-09-05 ENCOUNTER — OFFICE VISIT (OUTPATIENT)
Dept: ORTHOPEDIC SURGERY | Facility: CLINIC | Age: 83
End: 2023-09-05
Payer: MEDICARE

## 2023-09-05 ENCOUNTER — TELEPHONE (OUTPATIENT)
Dept: ORTHOPEDIC SURGERY | Facility: CLINIC | Age: 83
End: 2023-09-05
Payer: MEDICARE

## 2023-09-05 VITALS — TEMPERATURE: 98.6 F | WEIGHT: 164.5 LBS | HEIGHT: 64 IN | BODY MASS INDEX: 28.09 KG/M2

## 2023-09-05 DIAGNOSIS — M25.562 LEFT KNEE PAIN, UNSPECIFIED CHRONICITY: ICD-10-CM

## 2023-09-05 DIAGNOSIS — M25.362 KNEE INSTABILITY, LEFT: Primary | ICD-10-CM

## 2023-09-05 PROCEDURE — 99204 OFFICE O/P NEW MOD 45 MIN: CPT | Performed by: ORTHOPAEDIC SURGERY

## 2023-09-05 NOTE — PROGRESS NOTES
General Exam    Patient: Diana Bianchi    YOB: 1940    Medical Record Number: [id number]    Chief Complaints: Left knee pain    History of Present Illness:     82 y.o. female patient who presents for evaluation of left knee pain.  Patient states she originally had total knee surgery done in 2007 in [facility].  She states she did quite well.  However the past few months she has been feeling some instability in her leg/knee with walking and medial pain.  Denies any injuries.    Denies any numbness or tingling.  Denies any fevers, cough or shortness of breath.    Pertinent past medical history she does see a cardiologist, history of blood clot, history of kidney disease but currently being worked up.      Allergies:   Allergies   Allergen Reactions    Clavulanic Acid Shortness Of Breath     Ok with amoxicillin  Did not tolerate clavulanate.   Ok with amoxicillin  Did not tolerate clavulanate.       Codeine Anaphylaxis    Cortisone Other (See Comments) and Shortness Of Breath    Fentanyl Shortness Of Breath    Hydrocodone-Acetaminophen Shortness Of Breath    Morphine And Related Anaphylaxis, Nausea And Vomiting, Other (See Comments) and Shortness Of Breath    Oxycodone-Acetaminophen Shortness Of Breath    Peanut (Diagnostic) Anaphylaxis    Peanut Oil Shortness Of Breath    Peanut-Containing Drug Products Anaphylaxis    Statins Other (See Comments) and Shortness Of Breath          Zithromax [Azithromycin] Anaphylaxis    Acid Blockers Support Other (See Comments)    Prednisone Unknown - Low Severity and Other (See Comments)       Home Medications:      Current Outpatient Medications:     acetaminophen (TYLENOL) 500 MG tablet, Take 1 tablet by mouth Every 6 (Six) Hours As Needed for Mild Pain., Disp: , Rfl:     amLODIPine (NORVASC) 2.5 MG tablet, TAKE ONE TABLET BY MOUTH DAILY, Disp: 90 tablet, Rfl: 1    aspirin 81 MG EC tablet, Take 1 tablet by mouth Daily., Disp: , Rfl:      "Calcium-Magnesium-Vitamin D (CALCIUM MAGNESIUM PO), Take 1 tablet by mouth Every Night. HOLD PRIOR TO OR, Disp: , Rfl:     co-enzyme Q-10 30 MG capsule, Take 1 tablet by mouth., Disp: , Rfl:     famotidine (Pepcid) 20 MG tablet, Take 1 tablet by mouth 2 (Two) Times a Day., Disp: 180 tablet, Rfl: 2    fexofenadine (ALLEGRA) 180 MG tablet, Take 1 tablet by mouth Daily., Disp: , Rfl:     fluticasone (FLONASE) 50 MCG/ACT nasal spray, 2 sprays into the nostril(s) as directed by provider 2 (two) times a day. A, Disp: 16 g, Rfl: 10    folic acid (FOLVITE) 400 MCG tablet, Take 1 tablet by mouth Daily., Disp: , Rfl:     furosemide (LASIX) 20 MG tablet, Take 1 tablet by mouth Every Other Day. Then take one half tablet all other days., Disp: , Rfl:     losartan (COZAAR) 50 MG tablet, Take 1 tablet by mouth Daily., Disp: 90 tablet, Rfl: 3    multivitamin with minerals tablet tablet, Take 1 tablet by mouth Daily. HOLD FOR SURGERY, Disp: , Rfl:     Past Medical History:   Diagnosis Date    Acute deep vein thrombosis (DVT) 06/02/2022    Aortic calcification 05/26/2021    Moderate per echo    ASCVD (arteriosclerotic cardiovascular disease)     MI 1991, treated in [place] with tPA.  Cath in 2005 in [place], report unavailable, but no stenting was done.    Blood clot in vein     RIGHT FOOT    Cataract extraction status of left eye 04/29/2021    Cataract extraction status of right eye 03/25/2021    Chronic hyponatremia     Claustrophobia     Concussion     Depression     Endometrial cancer     Esophagitis 01/09/2022    GERD (gastroesophageal reflux disease)     Hypertension     Limited mobility     RIGHT FOOT/RIGHT ANKLE    Myocardial infarction Sept 1991    OA (osteoarthritis)     RIGHT FOOT    MICHAELLE (obstructive sleep apnea)     STATES \"BORDERLINE\", AWAITING FOR ANOTHER TEST . NO MACHINE USE    Peripheral neuropathy 10/12/2022    PONV (postoperative nausea and vomiting)     N/V     Presence of intraocular lens 03/18/2021    PVCs " "(premature ventricular contractions) 2020    Right foot pain     Seasonal allergies        Past Surgical History:   Procedure Laterality Date    ANKLE FUSION Right 2021    Procedure: RIGHT TRIPLE ARTHRODESIS 1ST TARSOMETATARSAL JOINT ARTHRODESIS ACHILLES LENGTHENING;  Surgeon: Llye Rm Jr., MD;  Location: [facility] [place];  Service: Orthopedics;  Laterality: Right;    CARDIAC CATHETERIZATION  1991    CATARACT EXTRACTION WITH INTRAOCULAR LENS IMPLANT Bilateral     COLONOSCOPY      ENDOSCOPY      FOOT SURGERY Left     METAL AND PINS IN LEFT FOOT    HYSTERECTOMY      KNEE ARTHROPLASTY Bilateral     KNEE SURGERY      LUMBAR FUSION      3-4,4-5    OOPHORECTOMY Bilateral     TONSILLECTOMY         Social History     Occupational History    Not on file   Tobacco Use    Smoking status: Never    Smokeless tobacco: Never    Tobacco comments:     caffeine use/ 3 CUPS DAILY    Vaping Use    Vaping Use: Never used   Substance and Sexual Activity    Alcohol use: No    Drug use: No    Sexual activity: Not Currently     Partners: Male      Social History     Social History Narrative    Not on file       Family History   Problem Relation Age of Onset    Heart disease Mother     Heart disease Father     Heart attack Father     Bone cancer Sister     Breast cancer Sister     Heart disease Brother          age 47    Heart failure Brother     Stroke Paternal Grandfather     Hypertension Other     Heart attack Maternal Grandmother     Heart attack Maternal Uncle     Heart attack Maternal Uncle     Hypertension Brother     Clotting disorder Sister         self 1992    Malig Hyperthermia Neg Hx        Review of Systems:      Constitutional: Denies fever, shaking or chills         All other pertinent positives and negatives as noted above in HPI.    Physical Exam: 82 y.o. female    Vitals:    23 1058   Temp: 98.6 °F (37 °C)   TempSrc: Temporal   Weight: 74.6 kg (164 lb 8 oz)   Height: 162.6 cm (64\") "       General:  Patient is awake and alert.  Appears in no acute distress or discomfort.      Musculoskeletal/Extremities:    Left lower extremity examined incision is well-healed.  Minimal tenderness along the medial aspect of the knee.  Knee range of motion is overall full and painless.  Knee does feel stable varus valgus stress.  Does positive instability with anterior and posterior drawer.  Motor and sensory intact distally.         Radiology:       AP pelvis and 3 views left knee AP lateral and sunrise taken and reviewed to evaluate the patient's complaint/s.    AP pelvis demonstrates some moderate degenerative changes left hip joint and mild degenerative changes right hip joint some early bone sclerosis osteophyte formation.      Imaging shows status post total knee arthroplasty implant appears to be natural knee, CR femur.  On the lateral view there is evident instability with anterior subluxation of the tibia in reference to the femur.  There appears to be somewhat excessive slope of the tibial component.  Possible minimal notching on the anterior aspect of the femur.  No acute fractures noticed.     No imaging for comparison.    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   It appears that her knee is on stable.  This is noted on exam, history and imaging findings.  She did have a CR component in which case the PCL is now insufficient.  There is noted excessive slope on the tibial component.  Told the patient surgery would be the definitive treatment to address this.  I am not sure that upsizing the polyethylene plastic would give her what she needs especially given the slope of the tibial component.  Told her she may require a full knee revision in which we have to take out both components and use revision implants.  Patient states she does not want surgery at this time as she needs to think about things she is going to discuss  with her family.  At this time she does not want to get a brace which we will set up for her and order some PT during interim.  We will have her follow back in 3 weeks.  I did tell her she would require cardiac and likely medical clearance before any surgery.  There is any changes in the interim she is instructed to let us know.        We will plan for follow up 3 weeks.    All questions were answered.  Patient understands and agrees with the plan.    Speedy Ortiz MD    09/05/2023    CC to Graciela Patten MD

## 2023-09-05 NOTE — TELEPHONE ENCOUNTER
Caller: Diana Bianchi    Relationship: Self    Best call back number: [phone]    Who is your current provider:      Who would you like your new provider to be:      What are your reasons for transferring care: PATIENT WOULD LIKE A 2ND OPINION     Additional notes: WAS SEEN IN OFFICE 09/05/2023 @11 FOR LEFT KNEE

## 2023-09-06 ENCOUNTER — TELEPHONE (OUTPATIENT)
Dept: ORTHOPEDIC SURGERY | Facility: CLINIC | Age: 83
End: 2023-09-06
Payer: MEDICARE

## 2023-09-06 DIAGNOSIS — M25.562 LEFT KNEE PAIN, UNSPECIFIED CHRONICITY: Primary | ICD-10-CM

## 2023-09-06 DIAGNOSIS — M25.362 KNEE INSTABILITY, LEFT: ICD-10-CM

## 2023-09-06 NOTE — TELEPHONE ENCOUNTER
Spoke to patient informed of message with RBB /TJS. Patient will be in this Friday to see Ana for knee brace per Steve.

## 2023-09-06 NOTE — TELEPHONE ENCOUNTER
[facility] called the patient to schedule for PT but patient wants aquatics added to the order. Please advise.

## 2023-09-06 NOTE — TELEPHONE ENCOUNTER
I called and spoke to this patient in regards to her requesting a second opinion with Dr. Blair.  Patient was seen and evaluated by one of our partners Dr. Speedy Ortiz yesterday in the clinic.  Dr. Ortiz evaluated the patient and noted that she had an unstable knee due to her unusual tibial slope and in an adequate PCL as she has a cruciate retaining ligament.  Dr. Ortiz recommended that the patient proceed forward with a right knee revision.  Patient states that she wanted a second opinion to make sure that surgery was the best option for her considering her condition being 82 years old and not wanting another surgery if possible.  I reviewed her case with Dr. Blair yesterday and Dr. Blair concurred that the patient's x-rays shows that she does have an unstable knee and would recommend a complete knee revision as Dr. Ortiz recommended.  Patient states that she would still like to come in and see Dr. Blair and go over this in further details.  Therefore patient will schedule an appointment at one of his next available appointments to go over this in detail.  Patient was asking about a brace that she spoke with Dr. Ortiz in the clinic about yesterday.  We will notify our group strep Ria to contact the patient to set her up for a hinged knee sleeve to give her some support in the interim.

## 2023-09-07 ENCOUNTER — PATIENT ROUNDING (BHMG ONLY) (OUTPATIENT)
Dept: ORTHOPEDIC SURGERY | Facility: CLINIC | Age: 83
End: 2023-09-07
Payer: MEDICARE

## 2023-09-07 NOTE — PROGRESS NOTES
A Intell[employer] Message has been sent to the patient for PATIENT ROUNDING with [facility] [place]

## 2023-09-29 ENCOUNTER — TELEPHONE (OUTPATIENT)
Dept: CARDIOLOGY | Facility: CLINIC | Age: 83
End: 2023-09-29
Payer: MEDICARE

## 2023-09-29 NOTE — TELEPHONE ENCOUNTER
Pt is scheduled for left total knee revision with under general ANES, with Dr. Simba Reyes.  They are requesting pre op risk assessment, pt is also taking ASA 81 mg daily.

## 2023-09-29 NOTE — TELEPHONE ENCOUNTER
Date of Office Visit:  2023  Encounter Provider:  Vincenzo Hudson MD  Place of Service:  [facility] CARDIOLOGY  Patient Name: Diana Bianchi  :  1940      To Whom it May Concern:    Ms Bianchi has known CAD; she had an MI in  that was treated with lytics. She's never had a stent or a bypass surgery. She had a normal stress test in 2017. She has normal LV systolic function. She has a murmur that is due to aortic sclerosis; an echo in 2023 did not show any aortic stenosis.     She has chronic asymptomatic sinus bradycardia. She has chronic hypertension.     Her functional status is quite good, and she has no cardiac limitations.  She is at low risk of major adverse cardiovascular events with surgery.  Aspirin should be continued perioperatively.    Please contact our office with any questions or concerns. As always, it has been a pleasure to participate in your patient's care.      Vincenzo Hudson MD  [facility]

## 2023-10-02 ENCOUNTER — PRE-ADMISSION TESTING (OUTPATIENT)
Dept: PREADMISSION TESTING | Facility: HOSPITAL | Age: 83
End: 2023-10-02
Payer: MEDICARE

## 2023-10-02 VITALS
BODY MASS INDEX: 29.27 KG/M2 | SYSTOLIC BLOOD PRESSURE: 181 MMHG | HEIGHT: 63 IN | HEART RATE: 51 BPM | TEMPERATURE: 97.5 F | WEIGHT: 165.2 LBS | RESPIRATION RATE: 16 BRPM | DIASTOLIC BLOOD PRESSURE: 81 MMHG | OXYGEN SATURATION: 98 %

## 2023-10-02 LAB
ALBUMIN SERPL-MCNC: 4.1 G/DL (ref 3.5–5.2)
ALBUMIN/GLOB SERPL: 1.4 G/DL
ALP SERPL-CCNC: 81 U/L (ref 39–117)
ALT SERPL W P-5'-P-CCNC: 12 U/L (ref 1–33)
ANION GAP SERPL CALCULATED.3IONS-SCNC: 12 MMOL/L (ref 5–15)
AST SERPL-CCNC: 19 U/L (ref 1–32)
BILIRUB SERPL-MCNC: 0.5 MG/DL (ref 0–1.2)
BUN SERPL-MCNC: 18 MG/DL (ref 8–23)
BUN/CREAT SERPL: 20 (ref 7–25)
CALCIUM SPEC-SCNC: 9.8 MG/DL (ref 8.6–10.5)
CHLORIDE SERPL-SCNC: 102 MMOL/L (ref 98–107)
CO2 SERPL-SCNC: 26 MMOL/L (ref 22–29)
CREAT SERPL-MCNC: 0.9 MG/DL (ref 0.57–1)
DEPRECATED RDW RBC AUTO: 43.1 FL (ref 37–54)
EGFRCR SERPLBLD CKD-EPI 2021: 64.8 ML/MIN/1.73
ERYTHROCYTE [DISTWIDTH] IN BLOOD BY AUTOMATED COUNT: 13.2 % (ref 12.3–15.4)
GLOBULIN UR ELPH-MCNC: 2.9 GM/DL
GLUCOSE SERPL-MCNC: 96 MG/DL (ref 65–99)
HBA1C MFR BLD: 5.6 % (ref 4.8–5.6)
HCT VFR BLD AUTO: 36.2 % (ref 34–46.6)
HGB BLD-MCNC: 11.9 G/DL (ref 12–15.9)
MCH RBC QN AUTO: 29.5 PG (ref 26.6–33)
MCHC RBC AUTO-ENTMCNC: 32.9 G/DL (ref 31.5–35.7)
MCV RBC AUTO: 89.6 FL (ref 79–97)
PLATELET # BLD AUTO: 311 10*3/MM3 (ref 140–450)
PMV BLD AUTO: 10 FL (ref 6–12)
POTASSIUM SERPL-SCNC: 3.8 MMOL/L (ref 3.5–5.2)
PROT SERPL-MCNC: 7 G/DL (ref 6–8.5)
RBC # BLD AUTO: 4.04 10*6/MM3 (ref 3.77–5.28)
SODIUM SERPL-SCNC: 140 MMOL/L (ref 136–145)
WBC NRBC COR # BLD: 5.73 10*3/MM3 (ref 3.4–10.8)

## 2023-10-02 PROCEDURE — 85027 COMPLETE CBC AUTOMATED: CPT

## 2023-10-02 PROCEDURE — 36415 COLL VENOUS BLD VENIPUNCTURE: CPT

## 2023-10-02 PROCEDURE — 80053 COMPREHEN METABOLIC PANEL: CPT

## 2023-10-02 PROCEDURE — 83036 HEMOGLOBIN GLYCOSYLATED A1C: CPT

## 2023-10-02 RX ORDER — CHLORHEXIDINE GLUCONATE 500 MG/1
1 CLOTH TOPICAL TAKE AS DIRECTED
Status: ON HOLD | COMMUNITY
End: 2023-10-09

## 2023-10-02 ASSESSMENT — KOOS JR
KOOS JR SCORE: 50.01
KOOS JR SCORE: 15

## 2023-10-02 NOTE — DISCHARGE INSTRUCTIONS
Take the following medications the morning of surgery: AMLODIPINE, PEPCID    Time of arrival: 1:30 pm    If you are on prescription narcotic pain medication to control your pain you may also take that medication the morning of surgery.    General Instructions:  Do not eat solid food after midnight the night before surgery.  You may drink clear liquids day of surgery but must stop at least one hour before your hospital arrival time.  It is beneficial for you to have a clear drink that contains carbohydrates the day of surgery.  We suggest a 12 to 20 ounce bottle of Gatorade or Powerade for non-diabetic patients or a 12 to 20 ounce bottle of G2 or Powerade Zero for diabetic patients. (Pediatric patients, are not advised to drink a 12 to 20 ounce carbohydrate drink)    Clear liquids are liquids you can see through.  Nothing red in color.     Plain water                               Sports drinks  Sodas                                   Gelatin (Jell-O)  Fruit juices without pulp such as white grape juice and apple juice  Popsicles that contain no fruit or yogurt  Tea or coffee (no cream or milk added)  Gatorade / Powerade  G2 / Powerade Zero      Patients who avoid smoking, chewing tobacco and alcohol for 4 weeks prior to surgery have a reduced risk of post-operative complications.  Quit smoking as many days before surgery as you can.  Do not smoke, use chewing tobacco or drink alcohol the day of surgery.   If applicable bring your C-PAP/ BI-PAP machine in with you to preop day of surgery.  Bring any papers given to you in the doctor’s office.  Wear clean comfortable clothes.  Do not wear contact lenses, false eyelashes or make-up.  Bring a case for your glasses.   Bring crutches or walker if applicable.  Remove all piercings.  Leave jewelry and any other valuables at home.  Hair extensions with metal clips must be removed prior to surgery.  The Pre-Admission Testing nurse will instruct you to bring medications if  unable to obtain an accurate list in Pre-Admission Testing.        If you were given a blood bank ID arm band remember to bring it with you the day of surgery.    Preventing a Surgical Site Infection:  For 2 to 3 days before surgery, avoid shaving with a razor because the razor can irritate skin and make it easier to develop an infection.    Any areas of open skin can increase the risk of a post-operative wound infection by allowing bacteria to enter and travel throughout the body.  Notify your surgeon if you have any skin wounds / rashes even if it is not near the expected surgical site.  The area will need assessed to determine if surgery should be delayed until it is healed.  The night prior to surgery shower using a fresh bar of anti-bacterial soap (such as Dial) and clean washcloth.  Sleep in a clean bed with clean clothing.  Do not allow pets to sleep with you.  Shower on the morning of surgery using a fresh bar of anti-bacterial soap (such as Dial) and clean washcloth.  Dry with a clean towel and dress in clean clothing.  Ask your surgeon if you will be receiving antibiotics prior to surgery.  Make sure you, your family, and all healthcare providers clean their hands with soap and water or an alcohol based hand  before caring for you or your wound.  CHLORHEXIDINE CLOTH INSTRUCTIONS  The morning of surgery follow these instructions using the Chlorhexidine cloths you've been given.  These steps reduce bacteria on the body.  Do not use the cloths near your eyes, ears mouth, genitalia or on open wounds.  Throw the cloths away after use but do not try to flush them down a toilet.      Open and remove one cloth at a time from the package.    Leave the cloth unfolded and begin the bathing.  Massage the skin with the cloths using gentle pressure to remove bacteria.  Do not scrub harshly.   Follow the steps below with one 2% CHG cloth per area (6 total cloths).  One cloth for neck, shoulders and chest.  One  cloth for both arms, hands, fingers and underarms (do underarms last).  One cloth for the abdomen followed by groin.  One cloth for right leg and foot including between the toes.  One cloth for left leg and foot including between the toes.  The last cloth is to be used for the back of the neck, back and buttocks.    Allow the CHG to air dry 3 minutes on the skin which will give it time to work and decrease the chance of irritation.  The skin may feel sticky until it is dry.  Do not rinse with water or any other liquid or you will lose the beneficial effects of the CHG.  If mild skin irritation occurs, do rinse the skin to remove the CHG.  Report this to the nurse at time of admission.  Do not apply lotions, creams, ointments, deodorants or perfumes after using the clothes. Dress in clean clothes before coming to the hospital.    Day of surgery:  Your arrival time is approximately two hours before your scheduled surgery time.  Upon arrival, a Pre-op nurse and Anesthesiologist will review your health history, obtain vital signs, and answer questions you may have.  The only belongings needed at this time will be a list of your home medications and if applicable your C-PAP/BI-PAP machine.  A Pre-op nurse will start an IV and you may receive medication in preparation for surgery, including something to help you relax.     Please be aware that surgery does come with discomfort.  We want to make every effort to control your discomfort so please discuss any uncontrolled symptoms with your nurse.   Your doctor will most likely have prescribed pain medications.      If you are going home after surgery you will receive individualized written care instructions before being discharged.  A responsible adult must drive you to and from the hospital on the day of your surgery and stay with you for 24 hours.  Discharge prescriptions can be filled by the hospital pharmacy during regular pharmacy hours.  If you are having surgery late  in the day/evening your prescription may be e-prescribed to your pharmacy.  Please verify your pharmacy hours or chose a 24 hour pharmacy to avoid not having access to your prescription because your pharmacy has closed for the day.    If you are staying overnight following surgery, you will be transported to your hospital room following the recovery period.  [facility] has all private rooms.    If you have any questions please call Pre-Admission Testing at [phone].  Deductibles and co-payments are collected on the day of service. Please be prepared to pay the required co-pay, deductible or deposit on the day of service as defined by your plan.    Call your surgeon immediately if you experience any of the following symptoms:  Sore Throat  Shortness of Breath or difficulty breathing  Cough  Chills  Body soreness or muscle pain  Headache  Fever  New loss of taste or smell  Do not arrive for your surgery ill.  Your procedure will need to be rescheduled to another time.  You will need to call your physician before the day of surgery to avoid any unnecessary exposure to hospital staff as well as other patients.

## 2023-10-09 ENCOUNTER — HOSPITAL ENCOUNTER (INPATIENT)
Facility: HOSPITAL | Age: 83
LOS: 2 days | Discharge: NURSING, REHAB OR LONG-TERM CARE | DRG: 468 | End: 2023-10-11
Attending: ORTHOPAEDIC SURGERY | Admitting: ORTHOPAEDIC SURGERY
Payer: MEDICARE

## 2023-10-09 ENCOUNTER — ANESTHESIA EVENT (OUTPATIENT)
Dept: PERIOP | Facility: HOSPITAL | Age: 83
DRG: 468 | End: 2023-10-09
Payer: MEDICARE

## 2023-10-09 ENCOUNTER — ANESTHESIA (OUTPATIENT)
Dept: PERIOP | Facility: HOSPITAL | Age: 83
DRG: 468 | End: 2023-10-09
Payer: MEDICARE

## 2023-10-09 ENCOUNTER — APPOINTMENT (OUTPATIENT)
Dept: GENERAL RADIOLOGY | Facility: HOSPITAL | Age: 83
DRG: 468 | End: 2023-10-09
Payer: MEDICARE

## 2023-10-09 DIAGNOSIS — T84.093A FAILED TOTAL LEFT KNEE REPLACEMENT, INITIAL ENCOUNTER: Primary | ICD-10-CM

## 2023-10-09 PROBLEM — Z41.9 SURGERY, ELECTIVE: Status: ACTIVE | Noted: 2023-10-09

## 2023-10-09 LAB
HCT VFR BLD AUTO: 37.5 % (ref 34–46.6)
HGB BLD-MCNC: 12.1 G/DL (ref 12–15.9)

## 2023-10-09 PROCEDURE — 25010000002 HYDROMORPHONE 1 MG/ML SOLUTION: Performed by: STUDENT IN AN ORGANIZED HEALTH CARE EDUCATION/TRAINING PROGRAM

## 2023-10-09 PROCEDURE — C1776 JOINT DEVICE (IMPLANTABLE): HCPCS | Performed by: ORTHOPAEDIC SURGERY

## 2023-10-09 PROCEDURE — 25010000002 SUGAMMADEX 200 MG/2ML SOLUTION: Performed by: STUDENT IN AN ORGANIZED HEALTH CARE EDUCATION/TRAINING PROGRAM

## 2023-10-09 PROCEDURE — 25010000002 MIDAZOLAM PER 1 MG: Performed by: ANESTHESIOLOGY

## 2023-10-09 PROCEDURE — 25010000002 ROPIVACAINE PER 1 MG: Performed by: ANESTHESIOLOGY

## 2023-10-09 PROCEDURE — 25810000003 LACTATED RINGERS PER 1000 ML: Performed by: ORTHOPAEDIC SURGERY

## 2023-10-09 PROCEDURE — 25010000002 LABETALOL 5 MG/ML SOLUTION: Performed by: STUDENT IN AN ORGANIZED HEALTH CARE EDUCATION/TRAINING PROGRAM

## 2023-10-09 PROCEDURE — 25810000003 LACTATED RINGERS PER 1000 ML: Performed by: ANESTHESIOLOGY

## 2023-10-09 PROCEDURE — 25010000002 MAGNESIUM SULFATE PER 500 MG OF MAGNESIUM: Performed by: STUDENT IN AN ORGANIZED HEALTH CARE EDUCATION/TRAINING PROGRAM

## 2023-10-09 PROCEDURE — 85018 HEMOGLOBIN: CPT | Performed by: ORTHOPAEDIC SURGERY

## 2023-10-09 PROCEDURE — 25010000002 PROPOFOL 10 MG/ML EMULSION: Performed by: STUDENT IN AN ORGANIZED HEALTH CARE EDUCATION/TRAINING PROGRAM

## 2023-10-09 PROCEDURE — 25010000002 ONDANSETRON PER 1 MG: Performed by: STUDENT IN AN ORGANIZED HEALTH CARE EDUCATION/TRAINING PROGRAM

## 2023-10-09 PROCEDURE — 25010000002 KETOROLAC TROMETHAMINE PER 15 MG: Performed by: ORTHOPAEDIC SURGERY

## 2023-10-09 PROCEDURE — 73560 X-RAY EXAM OF KNEE 1 OR 2: CPT

## 2023-10-09 PROCEDURE — C1713 ANCHOR/SCREW BN/BN,TIS/BN: HCPCS | Performed by: ORTHOPAEDIC SURGERY

## 2023-10-09 PROCEDURE — 85014 HEMATOCRIT: CPT | Performed by: ORTHOPAEDIC SURGERY

## 2023-10-09 PROCEDURE — 0SRD0J9 REPLACEMENT OF LEFT KNEE JOINT WITH SYNTHETIC SUBSTITUTE, CEMENTED, OPEN APPROACH: ICD-10-PCS | Performed by: ORTHOPAEDIC SURGERY

## 2023-10-09 PROCEDURE — 25810000003 LACTATED RINGERS PER 1000 ML: Performed by: STUDENT IN AN ORGANIZED HEALTH CARE EDUCATION/TRAINING PROGRAM

## 2023-10-09 PROCEDURE — 25010000002 EPINEPHRINE PER 0.1 MG: Performed by: ORTHOPAEDIC SURGERY

## 2023-10-09 PROCEDURE — 0SPD0JZ REMOVAL OF SYNTHETIC SUBSTITUTE FROM LEFT KNEE JOINT, OPEN APPROACH: ICD-10-PCS | Performed by: ORTHOPAEDIC SURGERY

## 2023-10-09 PROCEDURE — 25010000002 CEFAZOLIN IN DEXTROSE 2000 MG/ 100 ML SOLUTION: Performed by: ORTHOPAEDIC SURGERY

## 2023-10-09 PROCEDURE — 25010000002 BUPIVACAINE (PF) 0.5 % SOLUTION 30 ML VIAL: Performed by: ORTHOPAEDIC SURGERY

## 2023-10-09 DEVICE — IMPLANTABLE DEVICE
Type: IMPLANTABLE DEVICE | Site: KNEE | Status: FUNCTIONAL
Brand: NEXGEN®

## 2023-10-09 DEVICE — IMPLANTABLE DEVICE: Type: IMPLANTABLE DEVICE | Site: KNEE | Status: FUNCTIONAL

## 2023-10-09 DEVICE — DEV CONTRL TISS STRATAFIX SPIRAL MNCRYL UD 3/0 PLS 30CM: Type: IMPLANTABLE DEVICE | Site: KNEE | Status: FUNCTIONAL

## 2023-10-09 DEVICE — DEV CONTRL TISS STRATAFIX SYMM PDS PLUS VIL CT-1 45CM: Type: IMPLANTABLE DEVICE | Site: KNEE | Status: FUNCTIONAL

## 2023-10-09 DEVICE — PALACOS® R+G IS A FAST SETTING POLYMER CONTAINING GENTAMICIN, FOR USE IN BONE SURGERY. MIXING OF THE TWO COMPONENT SYSTEM, CONSISTING OF A POWDER AND A LIQUID, INITIALLY PRODUCES A LIQUID AND THEN A PASTE, WHICH IS USED TO ANCHOR THE PROSTHESIS TO THE BONE. THE HARDENED BONE CEMENT ALLOWS STABLE FIXATION OF THE PROSTHESIS AND TRANSFERS ALL STRESSES PRODUCED IN A MOVEMENT TO THE BONE VIA THE LARGE INTERFACE. INSOLUBLE ZIRCONIUM DIOXIDE IS INCLUDED IN THE CEMENT POWDER AS AN X RAY CONTRAST MEDIUM. THE CHLOROPHYLL ADDITIVE SERVES AS OPTICAL MARKING OF THE BONE CEMENT AT THE SITE OF THE OPERATION.
Type: IMPLANTABLE DEVICE | Site: KNEE | Status: FUNCTIONAL
Brand: PALACOS®

## 2023-10-09 RX ORDER — SODIUM CHLORIDE 0.9 % (FLUSH) 0.9 %
1-10 SYRINGE (ML) INJECTION AS NEEDED
Status: DISCONTINUED | OUTPATIENT
Start: 2023-10-09 | End: 2023-10-11 | Stop reason: HOSPADM

## 2023-10-09 RX ORDER — SODIUM CHLORIDE, SODIUM LACTATE, POTASSIUM CHLORIDE, CALCIUM CHLORIDE 600; 310; 30; 20 MG/100ML; MG/100ML; MG/100ML; MG/100ML
100 INJECTION, SOLUTION INTRAVENOUS CONTINUOUS
Status: DISCONTINUED | OUTPATIENT
Start: 2023-10-09 | End: 2023-10-11 | Stop reason: HOSPADM

## 2023-10-09 RX ORDER — MAGNESIUM HYDROXIDE 1200 MG/15ML
LIQUID ORAL AS NEEDED
Status: DISCONTINUED | OUTPATIENT
Start: 2023-10-09 | End: 2023-10-09 | Stop reason: HOSPADM

## 2023-10-09 RX ORDER — SODIUM CHLORIDE, SODIUM LACTATE, POTASSIUM CHLORIDE, CALCIUM CHLORIDE 600; 310; 30; 20 MG/100ML; MG/100ML; MG/100ML; MG/100ML
9 INJECTION, SOLUTION INTRAVENOUS CONTINUOUS
Status: DISCONTINUED | OUTPATIENT
Start: 2023-10-09 | End: 2023-10-09

## 2023-10-09 RX ORDER — LIDOCAINE HYDROCHLORIDE 20 MG/ML
INJECTION, SOLUTION INFILTRATION; PERINEURAL AS NEEDED
Status: DISCONTINUED | OUTPATIENT
Start: 2023-10-09 | End: 2023-10-09 | Stop reason: SURG

## 2023-10-09 RX ORDER — CETIRIZINE HYDROCHLORIDE 10 MG/1
10 TABLET ORAL DAILY
Status: DISCONTINUED | OUTPATIENT
Start: 2023-10-10 | End: 2023-10-11 | Stop reason: HOSPADM

## 2023-10-09 RX ORDER — PROMETHAZINE HYDROCHLORIDE 25 MG/1
25 TABLET ORAL ONCE AS NEEDED
Status: DISCONTINUED | OUTPATIENT
Start: 2023-10-09 | End: 2023-10-09 | Stop reason: HOSPADM

## 2023-10-09 RX ORDER — SCOLOPAMINE TRANSDERMAL SYSTEM 1 MG/1
1 PATCH, EXTENDED RELEASE TRANSDERMAL ONCE
Status: DISCONTINUED | OUTPATIENT
Start: 2023-10-09 | End: 2023-10-11 | Stop reason: HOSPADM

## 2023-10-09 RX ORDER — EPHEDRINE SULFATE 50 MG/ML
INJECTION INTRAVENOUS AS NEEDED
Status: DISCONTINUED | OUTPATIENT
Start: 2023-10-09 | End: 2023-10-09 | Stop reason: SURG

## 2023-10-09 RX ORDER — ROPIVACAINE HYDROCHLORIDE 2 MG/ML
INJECTION, SOLUTION EPIDURAL; INFILTRATION; PERINEURAL
Status: COMPLETED | OUTPATIENT
Start: 2023-10-09 | End: 2023-10-09

## 2023-10-09 RX ORDER — DIPHENHYDRAMINE HYDROCHLORIDE 50 MG/ML
12.5 INJECTION INTRAMUSCULAR; INTRAVENOUS
Status: DISCONTINUED | OUTPATIENT
Start: 2023-10-09 | End: 2023-10-09 | Stop reason: HOSPADM

## 2023-10-09 RX ORDER — MIDAZOLAM HYDROCHLORIDE 1 MG/ML
0.5 INJECTION INTRAMUSCULAR; INTRAVENOUS
Status: DISCONTINUED | OUTPATIENT
Start: 2023-10-09 | End: 2023-10-09

## 2023-10-09 RX ORDER — SODIUM CHLORIDE 0.9 % (FLUSH) 0.9 %
3-10 SYRINGE (ML) INJECTION AS NEEDED
Status: DISCONTINUED | OUTPATIENT
Start: 2023-10-09 | End: 2023-10-09

## 2023-10-09 RX ORDER — SODIUM CHLORIDE 0.9 % (FLUSH) 0.9 %
3 SYRINGE (ML) INJECTION EVERY 12 HOURS SCHEDULED
Status: DISCONTINUED | OUTPATIENT
Start: 2023-10-09 | End: 2023-10-09

## 2023-10-09 RX ORDER — DROPERIDOL 2.5 MG/ML
0.62 INJECTION, SOLUTION INTRAMUSCULAR; INTRAVENOUS
Status: DISCONTINUED | OUTPATIENT
Start: 2023-10-09 | End: 2023-10-09 | Stop reason: HOSPADM

## 2023-10-09 RX ORDER — CEFAZOLIN SODIUM 2 G/100ML
2000 INJECTION, SOLUTION INTRAVENOUS EVERY 8 HOURS
Qty: 200 ML | Refills: 0 | Status: COMPLETED | OUTPATIENT
Start: 2023-10-10 | End: 2023-10-10

## 2023-10-09 RX ORDER — AMLODIPINE BESYLATE 2.5 MG/1
2.5 TABLET ORAL DAILY
Status: DISCONTINUED | OUTPATIENT
Start: 2023-10-10 | End: 2023-10-11 | Stop reason: HOSPADM

## 2023-10-09 RX ORDER — LABETALOL HYDROCHLORIDE 5 MG/ML
5 INJECTION, SOLUTION INTRAVENOUS
Status: DISCONTINUED | OUTPATIENT
Start: 2023-10-09 | End: 2023-10-09 | Stop reason: HOSPADM

## 2023-10-09 RX ORDER — ONDANSETRON 2 MG/ML
4 INJECTION INTRAMUSCULAR; INTRAVENOUS EVERY 6 HOURS PRN
Status: DISCONTINUED | OUTPATIENT
Start: 2023-10-09 | End: 2023-10-11 | Stop reason: HOSPADM

## 2023-10-09 RX ORDER — BUPIVACAINE HYDROCHLORIDE 2.5 MG/ML
INJECTION, SOLUTION EPIDURAL; INFILTRATION; INTRACAUDAL
Status: ACTIVE
Start: 2023-10-09 | End: 2023-10-10

## 2023-10-09 RX ORDER — ONDANSETRON 2 MG/ML
INJECTION INTRAMUSCULAR; INTRAVENOUS AS NEEDED
Status: DISCONTINUED | OUTPATIENT
Start: 2023-10-09 | End: 2023-10-09 | Stop reason: SURG

## 2023-10-09 RX ORDER — FAMOTIDINE 10 MG/ML
20 INJECTION, SOLUTION INTRAVENOUS ONCE
Status: COMPLETED | OUTPATIENT
Start: 2023-10-09 | End: 2023-10-09

## 2023-10-09 RX ORDER — LIDOCAINE HYDROCHLORIDE 10 MG/ML
0.5 INJECTION, SOLUTION INFILTRATION; PERINEURAL ONCE AS NEEDED
Status: DISCONTINUED | OUTPATIENT
Start: 2023-10-09 | End: 2023-10-09

## 2023-10-09 RX ORDER — HYDROMORPHONE HYDROCHLORIDE 2 MG/1
2 TABLET ORAL
Status: DISCONTINUED | OUTPATIENT
Start: 2023-10-09 | End: 2023-10-11 | Stop reason: HOSPADM

## 2023-10-09 RX ORDER — EPHEDRINE SULFATE 50 MG/ML
5 INJECTION, SOLUTION INTRAVENOUS ONCE AS NEEDED
Status: DISCONTINUED | OUTPATIENT
Start: 2023-10-09 | End: 2023-10-09 | Stop reason: HOSPADM

## 2023-10-09 RX ORDER — PROMETHAZINE HYDROCHLORIDE 25 MG/1
25 SUPPOSITORY RECTAL ONCE AS NEEDED
Status: DISCONTINUED | OUTPATIENT
Start: 2023-10-09 | End: 2023-10-09 | Stop reason: HOSPADM

## 2023-10-09 RX ORDER — MAGNESIUM SULFATE HEPTAHYDRATE 500 MG/ML
INJECTION, SOLUTION INTRAMUSCULAR; INTRAVENOUS AS NEEDED
Status: DISCONTINUED | OUTPATIENT
Start: 2023-10-09 | End: 2023-10-09 | Stop reason: SURG

## 2023-10-09 RX ORDER — LANOLIN ALCOHOL/MO/W.PET/CERES
400 CREAM (GRAM) TOPICAL DAILY
Status: DISCONTINUED | OUTPATIENT
Start: 2023-10-10 | End: 2023-10-11 | Stop reason: HOSPADM

## 2023-10-09 RX ORDER — FLUTICASONE PROPIONATE 50 MCG
2 SPRAY, SUSPENSION (ML) NASAL 2 TIMES DAILY
Status: DISCONTINUED | OUTPATIENT
Start: 2023-10-09 | End: 2023-10-11 | Stop reason: HOSPADM

## 2023-10-09 RX ORDER — HYDROMORPHONE HYDROCHLORIDE 1 MG/ML
0.25 INJECTION, SOLUTION INTRAMUSCULAR; INTRAVENOUS; SUBCUTANEOUS
Status: DISCONTINUED | OUTPATIENT
Start: 2023-10-09 | End: 2023-10-09 | Stop reason: HOSPADM

## 2023-10-09 RX ORDER — CEFAZOLIN SODIUM 2 G/100ML
2000 INJECTION, SOLUTION INTRAVENOUS ONCE
Status: COMPLETED | OUTPATIENT
Start: 2023-10-09 | End: 2023-10-09

## 2023-10-09 RX ORDER — HYDROCODONE BITARTRATE AND ACETAMINOPHEN 7.5; 325 MG/1; MG/1
1 TABLET ORAL EVERY 4 HOURS PRN
Status: DISCONTINUED | OUTPATIENT
Start: 2023-10-09 | End: 2023-10-09 | Stop reason: HOSPADM

## 2023-10-09 RX ORDER — MULTIPLE VITAMINS W/ MINERALS TAB 9MG-400MCG
1 TAB ORAL DAILY
Status: DISCONTINUED | OUTPATIENT
Start: 2023-10-10 | End: 2023-10-11 | Stop reason: HOSPADM

## 2023-10-09 RX ORDER — NALOXONE HCL 0.4 MG/ML
0.1 VIAL (ML) INJECTION
Status: DISCONTINUED | OUTPATIENT
Start: 2023-10-09 | End: 2023-10-11 | Stop reason: HOSPADM

## 2023-10-09 RX ORDER — ONDANSETRON 4 MG/1
4 TABLET, FILM COATED ORAL EVERY 6 HOURS PRN
Status: DISCONTINUED | OUTPATIENT
Start: 2023-10-09 | End: 2023-10-11 | Stop reason: HOSPADM

## 2023-10-09 RX ORDER — UREA 10 %
1 LOTION (ML) TOPICAL NIGHTLY PRN
Status: DISCONTINUED | OUTPATIENT
Start: 2023-10-09 | End: 2023-10-11 | Stop reason: HOSPADM

## 2023-10-09 RX ORDER — SODIUM CHLORIDE 9 MG/ML
40 INJECTION, SOLUTION INTRAVENOUS AS NEEDED
Status: DISCONTINUED | OUTPATIENT
Start: 2023-10-09 | End: 2023-10-11 | Stop reason: HOSPADM

## 2023-10-09 RX ORDER — HYDROMORPHONE HYDROCHLORIDE 4 MG/1
4 TABLET ORAL
Status: DISCONTINUED | OUTPATIENT
Start: 2023-10-09 | End: 2023-10-11 | Stop reason: HOSPADM

## 2023-10-09 RX ORDER — DEXAMETHASONE SODIUM PHOSPHATE 4 MG/ML
INJECTION, SOLUTION INTRA-ARTICULAR; INTRALESIONAL; INTRAMUSCULAR; INTRAVENOUS; SOFT TISSUE
Status: ACTIVE
Start: 2023-10-09 | End: 2023-10-10

## 2023-10-09 RX ORDER — HYDRALAZINE HYDROCHLORIDE 20 MG/ML
5 INJECTION INTRAMUSCULAR; INTRAVENOUS
Status: DISCONTINUED | OUTPATIENT
Start: 2023-10-09 | End: 2023-10-09 | Stop reason: HOSPADM

## 2023-10-09 RX ORDER — FLUMAZENIL 0.1 MG/ML
0.2 INJECTION INTRAVENOUS AS NEEDED
Status: DISCONTINUED | OUTPATIENT
Start: 2023-10-09 | End: 2023-10-09 | Stop reason: HOSPADM

## 2023-10-09 RX ORDER — ASPIRIN 81 MG/1
81 TABLET ORAL EVERY 12 HOURS SCHEDULED
Status: DISCONTINUED | OUTPATIENT
Start: 2023-10-10 | End: 2023-10-11 | Stop reason: HOSPADM

## 2023-10-09 RX ORDER — LOSARTAN POTASSIUM 50 MG/1
50 TABLET ORAL DAILY
Status: DISCONTINUED | OUTPATIENT
Start: 2023-10-10 | End: 2023-10-11 | Stop reason: HOSPADM

## 2023-10-09 RX ORDER — ACETAMINOPHEN 325 MG/1
325 TABLET ORAL EVERY 4 HOURS PRN
Status: DISCONTINUED | OUTPATIENT
Start: 2023-10-09 | End: 2023-10-10

## 2023-10-09 RX ORDER — NALOXONE HCL 0.4 MG/ML
0.2 VIAL (ML) INJECTION AS NEEDED
Status: DISCONTINUED | OUTPATIENT
Start: 2023-10-09 | End: 2023-10-09 | Stop reason: HOSPADM

## 2023-10-09 RX ORDER — SODIUM CHLORIDE 0.9 % (FLUSH) 0.9 %
10 SYRINGE (ML) INJECTION EVERY 12 HOURS SCHEDULED
Status: DISCONTINUED | OUTPATIENT
Start: 2023-10-09 | End: 2023-10-11 | Stop reason: HOSPADM

## 2023-10-09 RX ORDER — IPRATROPIUM BROMIDE AND ALBUTEROL SULFATE 2.5; .5 MG/3ML; MG/3ML
3 SOLUTION RESPIRATORY (INHALATION) ONCE AS NEEDED
Status: DISCONTINUED | OUTPATIENT
Start: 2023-10-09 | End: 2023-10-09 | Stop reason: HOSPADM

## 2023-10-09 RX ORDER — HYDROMORPHONE HYDROCHLORIDE 1 MG/ML
0.5 INJECTION, SOLUTION INTRAMUSCULAR; INTRAVENOUS; SUBCUTANEOUS
Status: DISCONTINUED | OUTPATIENT
Start: 2023-10-09 | End: 2023-10-11 | Stop reason: HOSPADM

## 2023-10-09 RX ORDER — HYDROCODONE BITARTRATE AND ACETAMINOPHEN 5; 325 MG/1; MG/1
1 TABLET ORAL ONCE AS NEEDED
Status: DISCONTINUED | OUTPATIENT
Start: 2023-10-09 | End: 2023-10-09 | Stop reason: HOSPADM

## 2023-10-09 RX ORDER — LABETALOL HYDROCHLORIDE 5 MG/ML
INJECTION, SOLUTION INTRAVENOUS AS NEEDED
Status: DISCONTINUED | OUTPATIENT
Start: 2023-10-09 | End: 2023-10-09 | Stop reason: SURG

## 2023-10-09 RX ORDER — PROPOFOL 10 MG/ML
VIAL (ML) INTRAVENOUS AS NEEDED
Status: DISCONTINUED | OUTPATIENT
Start: 2023-10-09 | End: 2023-10-09 | Stop reason: SURG

## 2023-10-09 RX ORDER — ONDANSETRON 2 MG/ML
4 INJECTION INTRAMUSCULAR; INTRAVENOUS ONCE AS NEEDED
Status: DISCONTINUED | OUTPATIENT
Start: 2023-10-09 | End: 2023-10-09 | Stop reason: HOSPADM

## 2023-10-09 RX ORDER — SODIUM CHLORIDE, SODIUM LACTATE, POTASSIUM CHLORIDE, CALCIUM CHLORIDE 600; 310; 30; 20 MG/100ML; MG/100ML; MG/100ML; MG/100ML
INJECTION, SOLUTION INTRAVENOUS CONTINUOUS PRN
Status: DISCONTINUED | OUTPATIENT
Start: 2023-10-09 | End: 2023-10-09 | Stop reason: SURG

## 2023-10-09 RX ORDER — ROCURONIUM BROMIDE 10 MG/ML
INJECTION, SOLUTION INTRAVENOUS AS NEEDED
Status: DISCONTINUED | OUTPATIENT
Start: 2023-10-09 | End: 2023-10-09 | Stop reason: SURG

## 2023-10-09 RX ORDER — FAMOTIDINE 20 MG/1
40 TABLET, FILM COATED ORAL DAILY
Status: DISCONTINUED | OUTPATIENT
Start: 2023-10-10 | End: 2023-10-11 | Stop reason: HOSPADM

## 2023-10-09 RX ADMIN — HYDROMORPHONE HYDROCHLORIDE 0.2 MG: 1 INJECTION, SOLUTION INTRAMUSCULAR; INTRAVENOUS; SUBCUTANEOUS at 17:05

## 2023-10-09 RX ADMIN — FLUTICASONE PROPIONATE 2 SPRAY: 50 SPRAY, METERED NASAL at 22:21

## 2023-10-09 RX ADMIN — ONDANSETRON 4 MG: 2 INJECTION INTRAMUSCULAR; INTRAVENOUS at 18:31

## 2023-10-09 RX ADMIN — SODIUM CHLORIDE, POTASSIUM CHLORIDE, SODIUM LACTATE AND CALCIUM CHLORIDE 9 ML/HR: 600; 310; 30; 20 INJECTION, SOLUTION INTRAVENOUS at 14:49

## 2023-10-09 RX ADMIN — ROCURONIUM BROMIDE 50 MG: 10 INJECTION, SOLUTION INTRAVENOUS at 16:29

## 2023-10-09 RX ADMIN — LABETALOL HYDROCHLORIDE 5 MG: 5 INJECTION, SOLUTION INTRAVENOUS at 17:10

## 2023-10-09 RX ADMIN — SUGAMMADEX 200 MG: 100 INJECTION, SOLUTION INTRAVENOUS at 18:31

## 2023-10-09 RX ADMIN — CEFAZOLIN SODIUM 2000 MG: 2 INJECTION, SOLUTION INTRAVENOUS at 16:15

## 2023-10-09 RX ADMIN — MIDAZOLAM 1 MG: 1 INJECTION INTRAMUSCULAR; INTRAVENOUS at 15:36

## 2023-10-09 RX ADMIN — MAGNESIUM SULFATE HEPTAHYDRATE 1 G: 500 INJECTION, SOLUTION INTRAMUSCULAR; INTRAVENOUS at 16:59

## 2023-10-09 RX ADMIN — SODIUM CHLORIDE, SODIUM LACTATE, POTASSIUM CHLORIDE, AND CALCIUM CHLORIDE: 600; 310; 30; 20 INJECTION, SOLUTION INTRAVENOUS at 16:20

## 2023-10-09 RX ADMIN — PROPOFOL 130 MG: 10 INJECTION, EMULSION INTRAVENOUS at 16:28

## 2023-10-09 RX ADMIN — SODIUM CHLORIDE, POTASSIUM CHLORIDE, SODIUM LACTATE AND CALCIUM CHLORIDE 100 ML/HR: 600; 310; 30; 20 INJECTION, SOLUTION INTRAVENOUS at 21:45

## 2023-10-09 RX ADMIN — Medication 10 ML: at 21:46

## 2023-10-09 RX ADMIN — HYDROMORPHONE HYDROCHLORIDE 0.3 MG: 1 INJECTION, SOLUTION INTRAMUSCULAR; INTRAVENOUS; SUBCUTANEOUS at 16:49

## 2023-10-09 RX ADMIN — SCOPALAMINE 1 PATCH: 1 PATCH, EXTENDED RELEASE TRANSDERMAL at 14:46

## 2023-10-09 RX ADMIN — EPHEDRINE SULFATE 10 MG: 50 INJECTION INTRAVENOUS at 18:00

## 2023-10-09 RX ADMIN — FAMOTIDINE 20 MG: 10 INJECTION INTRAVENOUS at 14:47

## 2023-10-09 RX ADMIN — ROPIVACAINE HYDROCHLORIDE 15 ML: 2 INJECTION, SOLUTION EPIDURAL; INFILTRATION at 15:40

## 2023-10-09 RX ADMIN — LIDOCAINE HYDROCHLORIDE 100 MG: 20 INJECTION, SOLUTION INFILTRATION; PERINEURAL at 16:28

## 2023-10-09 NOTE — ANESTHESIA PROCEDURE NOTES
Airway  Urgency: elective    Date/Time: 10/9/2023 4:33 PM  Airway not difficult    General Information and Staff    Patient location during procedure: OR  Anesthesiologist: Juan David Almazan MD  CRNA/CAA: Tello Skinner CRNA    Indications and Patient Condition  Indications for airway management: airway protection    Preoxygenated: yes  MILS not maintained throughout  Mask difficulty assessment: 1 - vent by mask    Final Airway Details  Final airway type: endotracheal airway      Successful airway: ETT  Cuffed: yes   Successful intubation technique: direct laryngoscopy  Endotracheal tube insertion site: oral  Blade: Jared  Blade size: 3  ETT size (mm): 7.0  Cormack-Lehane Classification: grade IIa - partial view of glottis  Placement verified by: capnometry   Cuff volume (mL): 8  Measured from: lips  ETT/EBT  to lips (cm): 21  Number of attempts at approach: 1  Assessment: lips, teeth, and gum same as pre-op and atraumatic intubation

## 2023-10-09 NOTE — H&P
"        ORTHOPEDIC SURGERY PRE-OP HISTORY AND PHYSICAL      Patient: Diana Bianchi  Date of Admission: 10/9/2023 12:50 PM  YOB: 1940  Medical Record Number: [id number]  Attending Physician: Simba Cavazos MD  Consulting Physician: Simba Cavazos MD    CHIEF COMPLAINT: Left Knee Pain and instability    HISTORY OF PRESENT ILLNESS: Patient is a 83 y.o. female [facility] with above complaints.  The patient has history of left TKA performed by outside orthopedic surgeon.  She began having symptoms of instability in her left knee.  X-rays were obtained in the office which showed a dislocated TKA.  She underwent cardiac clearance and presents for surgery today for a Left TKA to hinge construct.      ALLERGIES:   Allergies   Allergen Reactions    Clavulanic Acid Shortness Of Breath     Ok with amoxicillin  Did not tolerate clavulanate.   Ok with amoxicillin  Did not tolerate clavulanate.       Codeine Anaphylaxis    Cortisone Other (See Comments) and Shortness Of Breath    Fentanyl Shortness Of Breath    Hydrocodone-Acetaminophen Shortness Of Breath    Morphine And Related Anaphylaxis, Nausea And Vomiting, Other (See Comments) and Shortness Of Breath    Oxycodone-Acetaminophen Shortness Of Breath    Peanut (Diagnostic) Anaphylaxis    Peanut Oil Shortness Of Breath    Peanut-Containing Drug Products Anaphylaxis    Prednisone Shortness Of Breath and Other (See Comments)     \"I COULD NOT BREATHE AND SHOT MY BLOOD PRESSURE UP.\"    Statins Other (See Comments) and Shortness Of Breath          Zithromax [Azithromycin] Anaphylaxis    Acid Blockers Support Other (See Comments)       HOME MEDICATIONS:  Medications Prior to Admission   Medication Sig Dispense Refill Last Dose    acetaminophen (TYLENOL) 500 MG tablet Take 1 tablet by mouth Every 6 (Six) Hours As Needed for Mild Pain.   10/8/2023    amLODIPine (NORVASC) 2.5 MG tablet TAKE ONE TABLET BY MOUTH DAILY (Patient taking differently: " Take 1 tablet by mouth Daily.) 90 tablet 1 10/9/2023 at 0800    Chlorhexidine Gluconate Cloth 2 % pads Apply 1 application  topically Take As Directed.   10/9/2023    famotidine (Pepcid) 20 MG tablet Take 1 tablet by mouth 2 (Two) Times a Day. 180 tablet 2 10/9/2023 at 0800    fluticasone (FLONASE) 50 MCG/ACT nasal spray 2 sprays into the nostril(s) as directed by provider 2 (two) times a day. A 16 g 10 10/9/2023 at 0800    furosemide (LASIX) 20 MG tablet Take 0.5 tablets by mouth Every Other Day. Then take one half tablet all other days.   10/8/2023    losartan (COZAAR) 50 MG tablet Take 1 tablet by mouth Daily. 90 tablet 3 10/8/2023    aspirin 81 MG EC tablet Take 1 tablet by mouth Daily. FOLLOW MD INSTRUCTIONS FOR SURGERY   10/2/2023    Calcium-Magnesium-Vitamin D (CALCIUM MAGNESIUM PO) Take 1 tablet by mouth Every Night. HOLD PRIOR TO OR   10/2/2023    co-enzyme Q-10 30 MG capsule Take 1 tablet by mouth Daily. HOLD FOR ONE WEEK FOR SURGERY   10/2/2023    fexofenadine (ALLEGRA) 180 MG tablet Take 1 tablet by mouth Daily.   10/4/2023    folic acid (FOLVITE) 400 MCG tablet Take 1 tablet by mouth Daily.   10/2/2023    multivitamin with minerals tablet tablet Take 1 tablet by mouth Daily. HOLD FOR SURGERY   10/2/2023       Past Medical History:   Diagnosis Date    Acute deep vein thrombosis (DVT) 06/02/2022    RIGHT LEG    Aortic calcification 05/26/2021    Moderate per echo    ASCVD (arteriosclerotic cardiovascular disease)     MI 1991, treated in [place] with tPA.  Cath in 2005 in [place], report unavailable, but no stenting was done.    Bradycardia     Chronic hyponatremia     Claustrophobia     Concussion     HISTORY OF    Depression     Dizziness     Endometrial cancer     Esophagitis 01/09/2022    GERD (gastroesophageal reflux disease)     History of COVID-19 01/2021    Hypertension     Irregular heart beat     Limited mobility     RIGHT FOOT/RIGHT ANKLE    Myocardial infarction Sept 1991    OA  "(osteoarthritis)     RIGHT FOOT    MICHAELLE (obstructive sleep apnea)     STATES \"BORDERLINE: NO MACHINE USE    Peripheral neuropathy 10/12/2022    PONV (postoperative nausea and vomiting)     N/V     Presence of intraocular lens 2021    PVCs (premature ventricular contractions) 2020    Right foot pain     Seasonal allergies      Past Surgical History:   Procedure Laterality Date    ANKLE FUSION Right 2021    Procedure: RIGHT TRIPLE ARTHRODESIS 1ST TARSOMETATARSAL JOINT ARTHRODESIS ACHILLES LENGTHENING;  Surgeon: Lyle Rm Jr., MD;  [place] [facility] [place] [facility] [place]  Service: [place]opedics;  Laterality: Right;    CARDIAC CATHETERIZATION  1991    CATARACT EXTRACTION WITH INTRAOCULAR LENS IMPLANT Bilateral     COLONOSCOPY      ENDOSCOPY      FOOT SURGERY Left     METAL AND PINS IN LEFT FOOT    HYSTERECTOMY      TOTAL    KNEE ARTHROPLASTY Bilateral     KNEE SURGERY      LUMBAR FUSION      3-4,4-5    TONSILLECTOMY       Social History     Occupational History    Not on file   Tobacco Use    Smoking status: Never    Smokeless tobacco: Never    Tobacco comments:     caffeine use/ 3 CUPS DAILY    Vaping Use    Vaping Use: Never used   Substance and Sexual Activity    Alcohol use: No    Drug use: No    Sexual activity: Not Currently     Partners: Male      Social History     Social History Narrative    Not on file     Family History   Problem Relation Age of Onset    Heart disease Mother     Heart disease Father     Heart attack Father     Bone cancer Sister     Breast cancer Sister     Heart disease Brother          age 47    Heart failure Brother     Stroke Paternal Grandfather     Hypertension Other     Heart attack Maternal Grandmother     Heart attack Maternal Uncle     Heart attack Maternal Uncle     Hypertension Brother     Clotting disorder Sister         self 1992    Malig Hyperthermia Neg Hx        REVIEW OF SYSTEMS:    HEENT: Patient denies any headaches, vision changes, change in hearing, or " tinnitus, Patient denies epistaxis, sinus pain, hoarseness, or dysphagia   Pulmonary: Patient denies any cough, congestion, acute change in SOA or wheezing.   Cardiovascular: Patient denies any change in chest pain, dyspnea, palpitations, weakness, intolerance of exercise, varicosities, change in murmur   Gastrointestinal:  Patient denies change in appetite, melena, change in bowel habits.   Genital/Urinary: Patient denies dysuria, change in color of urine, change in frequency of urination, pain with urgency, change in incontinence, retention.   Musculoskeletal: Patient denies complaints of acute changes in symptoms of other joints not mentioned above.   Neurological: Patient denies changes in dizziness, tremor, ataxia, or difficulty in speaking or changes in memory.   Endocrine system: Patient denies acute changes in tremors, palpitations, polyuria, polydipsia, polyphagia, diaphoresis, exophthalmos, or goiter.   Psychological: Patient denies thoughts/plans of harming self or other; denies acute changes in depression,  insomnia, night terrors, sheela, disorientation.   Skin: Patient denies any bruising, rashes, discoloration, pruritus,or wounds not mentioned in history of present illness or chief complaint above.   Hematopoietic: Patient denies current bleeding, epistaxis, hematuria, or melena.    PHYSICAL EXAM:   Vitals:  Vitals:    10/09/23 1407 10/09/23 1415 10/09/23 1430 10/09/23 1445   BP: 157/80 177/68 150/79 178/58   BP Location: Right arm      Pulse: 56 61 54 76   Resp: 18 18 18 18   Temp: 97.8 øF (36.6 øC)      TempSrc: Oral      SpO2: 98% 98% 97% 96%       General:  83 y.o. female who appears about stated age.    Alert, cooperative, in no acute distress                       Head:    Normocephalic, without obvious abnormality, atraumatic   Eyes:            Lids and lashes normal, conjunctivae and sclerae normal, no         icterus, no pallor, corneas clear, PERRLA   Ears:    Ears appear intact with no  abnormalities noted   Throat:   No oral lesions, no thrush, oral mucosa moist   Neck:   No adenopathy, supple, trachea midline, no JVD   Back:     Limited exam shows no severe kyphosis present,no visible           erythema, no excessive  tenderness to palpation.    Lungs:     Respirations regular, even and unlabored.     Heart:    Normal rate, Pulses palpable   Chest Wall:    No abnormalities observed.   Abdomen:     Normal bowel sounds, no masses, no organomegaly, soft              non-tender, non-distended, no guarding, no rebound                      tenderness   Rectal:     Deferred   Pulses:   Pulses palpable and equal bilaterally   Skin:   No bleeding, bruising or rash   Lymph nodes:   No palpable adenopathy   Extremities:     Left Knee: Skin intact.  Obvious deformity. Ambulates with limp.  Painful ROM.  NVI distally.      DIAGNOSTIC TEST:  No visits with results within 2 Day(s) from this visit.   Latest known visit with results is:   Pre-Admission Testing on 10/02/2023   Component Date Value Ref Range Status    WBC 10/02/2023 5.73  3.40 - 10.80 10*3/mm3 Final    RBC 10/02/2023 4.04  3.77 - 5.28 10*6/mm3 Final    Hemoglobin 10/02/2023 11.9 (L)  12.0 - 15.9 g/dL Final    Hematocrit 10/02/2023 36.2  34.0 - 46.6 % Final    MCV 10/02/2023 89.6  79.0 - 97.0 fL Final    MCH 10/02/2023 29.5  26.6 - 33.0 pg Final    MCHC 10/02/2023 32.9  31.5 - 35.7 g/dL Final    RDW 10/02/2023 13.2  12.3 - 15.4 % Final    RDW-SD 10/02/2023 43.1  37.0 - 54.0 fl Final    MPV 10/02/2023 10.0  6.0 - 12.0 fL Final    Platelets 10/02/2023 311  140 - 450 10*3/mm3 Final    Glucose 10/02/2023 96  65 - 99 mg/dL Final    BUN 10/02/2023 18  8 - 23 mg/dL Final    Creatinine 10/02/2023 0.90  0.57 - 1.00 mg/dL Final    Sodium 10/02/2023 140  136 - 145 mmol/L Final    Potassium 10/02/2023 3.8  3.5 - 5.2 mmol/L Final    Chloride 10/02/2023 102  98 - 107 mmol/L Final    CO2 10/02/2023 26.0  22.0 - 29.0 mmol/L Final    Calcium 10/02/2023 9.8  8.6 -  10.5 mg/dL Final    Total Protein 10/02/2023 7.0  6.0 - 8.5 g/dL Final    Albumin 10/02/2023 4.1  3.5 - 5.2 g/dL Final    ALT (SGPT) 10/02/2023 12  1 - 33 U/L Final    AST (SGOT) 10/02/2023 19  1 - 32 U/L Final    Alkaline Phosphatase 10/02/2023 81  39 - 117 U/L Final    Total Bilirubin 10/02/2023 0.5  0.0 - 1.2 mg/dL Final    Globulin 10/02/2023 2.9  gm/dL Final    A/G Ratio 10/02/2023 1.4  g/dL Final    BUN/Creatinine Ratio 10/02/2023 20.0  7.0 - 25.0 Final    Anion Gap 10/02/2023 12.0  5.0 - 15.0 mmol/L Final    eGFR 10/02/2023 64.8  >60.0 mL/min/1.73 Final    Hemoglobin A1C 10/02/2023 5.60  4.80 - 5.60 % Final       No results found.      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 *      PLAN:    Left TKA revision to hinge construct.     Risks and benefits of surgical intervention were discussed in detail with the patient.  Risks of infection, fracture, dislocation, extremity length discrepancy, neurovascular injury, persistent pain, medical risks, anesthetic risk, need for additional surgery, deep venous thrombosis, pulmonary embolism and death.      The above diagnosis and treatment plan was discussed with the patient and/or family.  They were educated in both non-surgical and surgical treatment options for their condition.   They were given the opportunity to ask questions and were answered to their satisfaction.  They agreed to proceed with the above treatment plan.        Simba Cavazos MD  Date: 10/9/2023

## 2023-10-09 NOTE — ANESTHESIA PREPROCEDURE EVALUATION
Anesthesia Evaluation     history of anesthetic complications:  PONV  NPO Solid Status: > 8 hours  NPO Liquid Status: > 2 hours           Airway   Mallampati: II  TM distance: >3 FB  Neck ROM: full  Dental          Pulmonary - normal exam   (+) ,sleep apnea  Cardiovascular - normal exam  Exercise tolerance: good (4-7 METS)    ECG reviewed    (+) hypertension well controlled 2 medications or greater, valvular problems/murmurs AI, past MI (related to a clot in the90's, no issues since) , CAD, dysrhythmias (bigeminy on stress test +frequent PVCs) PVC    ROS comment: ú Calculated left ventricular EF = 63.4% Estimated left ventricular EF was in agreement with the calculated left ventricular EF. Left ventricular systolic function is normal. Normal left ventricular cavity size noted. Left ventricular wall thickness is consistent with mild concentric hypertrophy. All left ventricular wall segments contract normally. Left ventricular diastolic function is consistent with (grade I) impaired relaxation.  ú The left atrial cavity is moderately dilated.  ú The aortic valve is abnormal in structure. There is moderate calcification of the aortic valve. The aortic valve appears trileaflet. Trace to mild aortic valve regurgitation is present. No hemodynamically significant aortic valve stenosis is present.    Neuro/Psych  (+) dizziness/light headedness, psychiatric history    ROS Comment: Claustrophobia  GI/Hepatic/Renal/Endo    (+) GERD    Musculoskeletal     Abdominal    Substance History      OB/GYN          Other   arthritis,   history of cancer remission                  Anesthesia Plan    ASA 3     general     (Adductor canal block  Discussed multiple narcotic allergies, seems to be a variety of complaints from nausea to difficulty breathing. Will avoid narcotics as much as possible with blocks in place, discussed with patient in the event of block failure she may need Fentanyl. Discussed likelihood that fentanyl contributes  to her nausea, but the benefit may at some point exceed the risk. The patient appears to understand and wishes to proceed. TIVA for PONV.)  intravenous induction     Anesthetic plan, risks, benefits, and alternatives have been provided, discussed and informed consent has been obtained with: patient.

## 2023-10-09 NOTE — ANESTHESIA PROCEDURE NOTES
Peripheral Block    Pre-sedation assessment completed: 10/9/2023 3:30 PM    Patient reassessed immediately prior to procedure    Patient location during procedure: pre-op  Start time: 10/9/2023 3:30 PM  Stop time: 10/9/2023 3:40 PM  Reason for block: at surgeon's request and post-op pain management  Performed by  Anesthesiologist: Lyle David MD  Preanesthetic Checklist  Completed: patient identified, IV checked, site marked, risks and benefits discussed, surgical consent, monitors and equipment checked, pre-op evaluation and timeout performed  Prep:  Pt Position: supine  Sterile barriers:cap, gloves, mask and sterile barriers  Prep: ChloraPrep  Patient monitoring: blood pressure monitoring, continuous pulse oximetry and EKG  Procedure    Sedation: yes  Performed under: local infiltration  Guidance:ultrasound guided    ULTRASOUND INTERPRETATION.  Using ultrasound guidance a 22 G gauge needle was placed in close proximity to the nerve, at which point, under ultrasound guidance anesthetic was injected in the area of the nerve and spread of the anesthesia was seen on ultrasound in close proximity thereto.  There were no abnormalities seen on ultrasound; a digital image was taken; and the patient tolerated the procedure with no complications. Images:still images obtained, printed/placed on chart (U/S to localize the nerve)    Laterality:left  Block Type:adductor canal block  Injection Technique:single-shot  Needle Type:echogenic  Needle Gauge:22 G  Resistance on Injection: less than 15 psi    Medications Used: ropivacaine (NAROPIN) 0.2 % injection - Injection   15 mL - 10/9/2023 3:40:00 PM      Post Assessment  Injection Assessment: negative aspiration for heme, no paresthesia on injection and incremental injection  Patient Tolerance:comfortable throughout block  Complications:no

## 2023-10-09 NOTE — OP NOTE
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  Post-op Diagnosis:Same  Procedure: Left  Total Knee Arthroplasty Revision to hinge construct  Surgical Approach: Knee Medial Parapatellar   Surgeon:  Simba Cavazos MD  Anesthesia: General with Block, Anesthesiologist: Bryn Jha MD; Juan David Almazan MD  CRNA: Tello Skinner CRNA  Staff: Circulator: Simba Bianchi RN; Olivia Jarquin RN  Scrub Person: Danita Pyle; Na Tipton  Vendor Representative: Bubba Caldera  Assistant: Santhosh Baptiste APRN CFA  Estimated Blood Loss:100ml  Specimens:* No orders in the log *   Drains: none  Complications: None    Components Utilized:    Implant Name Type Inv. Item Serial No.  Lot No. LRB No. Used Action   DEV CONTRL TISS STRATAFIX SPIRAL MNCRYL UD 3/0 PLS 30CM - [id number] Implant DEV CONTRL TISS STRATAFIX SPIRAL MNCRYL UD 3/0 PLS 30CM  [employer]  DIV OF J AND J . Left 1 Implanted   DEV CONTRL TISS STRATAFIX SYMM PDS PLUS CHRISTAL CT-1 45CM - [id number] Implant DEV CONTRL TISS STRATAFIX SYMM PDS PLUS CHRISTAL CT-1 45CM  [employer]  DIV OF J [employer] J . Left 1 Implanted   DEV CONTRL TISS STRATAFIX SYMM PDS PLUS CHRISTAL CT-1 45CM - [id number] Implant DEV CONTRL TISS STRATAFIX SYMM PDS PLUS CHRISTAL CT-1 45CM  [employer]  DIV OF J AND J . Left 1 Implanted   CMT BONE PALACOS RPLSG W/GENT HI/VISC 1X40 - [id number] Implant CMT BONE PALACOS RPLSG W/GENT HI/VISC 1X40  [facility] MEDICAL [id number] Left 1 Implanted   CMT BONE PALACOS RPLSG W/GENT HI/VISC 1X40 - [id number] Implant CMT BONE PALACOS RPLSG W/GENT HI/VISC 1X40  [employer] [id number] Left 1 Implanted   EXT STEM FEM/KN NEXGEN STR [id number] 100MM - [id number] Implant EXT STEM FEM/KN NEXGEN STR [id number] 100MM  [employer] [id number] Left 1 Implanted   BLCK AUG/TIB NEXGEN ROT/HNG FULL SZ5 10MM - [id number] Implant BLCK AUG/TIB NEXGEN ROT/HNG FULL SZ5 10MM   [employer] [id number] Left 1 Implanted   BASEPLT TIB NEXGEN ROT/HNG STEMD SZ5 - [id number] Implant BASEPLT TIB NEXGEN ROT/HNG STEMD SZ5  [employer] [id number] Left 1 Implanted   COMP FEM NXGN ROT/HINGE JENNIFER LT - [id number] Implant COMP FEM NXGN ROT/HINGE JENNIFER LT  [employer] [id number] Left 1 Implanted   ART/SRF KN NXGN ROT/HNG JENNIFER 12MM - [id number] Implant ART/SRF KN NXGN ROT/HNG JENNIFER 12MM  [employer] [id number] Left 1 Implanted       Indication for Procedure:   The patient is a 83 y.o. female presents today for a total knee arthroplasty revision procedure because of failure of their total knee implants.  The patient was educated in risks of surgery that could include possible risk of infection, deep venous thrombosis, pulmonary embolism, fracture, neurovascular injury, leg length discrepancy, dislocation, possible persistent pain, need for additional surgeries, anesthetic risks, medical risks including heart attack and stroke, and death.  The discussion occurred in the office pre-operatively, and patient had the opportunity to ask questions, and concerns about the proposed surgery.  The patient also understood that medicine is not an exact science, and that outcomes of the surgical procedure may be less than desired. The patient wished to proceed.       Protocols for intravenous antibiotics and venous thrombosis were followed for this patient.  IV antibiotics were infused prior to surgery and will be discontinued within 24 hours of completion of the surgical procedure.  Thrombosis prophylaxis will be initiated within 24 hours of the completion of the surgical procedure.       Procedure:              After the patient was identified in the preoperative area, and the surgical site confirmed and marked, the patient was brought to the operating room on a stretcher.  They were placed supine on the operating room table and the above anesthetic was placed uneventfully.  A time-out procedure was performed.  The  intravenous ancef antibiotics infusion was completed.  A non-sterile tourniquet was placed on the operative thigh, and was prepped and draped in the usual sterile fashion.  An Esmarch was to exsanguinate the limb, and the tourniquet was inflated.                A 10 blade scalpel was used to make a longitudinal incision from above the patella to medial to the tibial tubercle.  A medial albert-patellar arthrotomy was performed with another 10 blade scalpel.  The synovial fluid was clear, and the medial joint line was elevated subperiosteally with electrocautery and a marsh elevator.  The patellar fat pad and scar tissue was removed.  The patella was found to be stable so it was left intact.  The polyethylene had severe loss posteriorly.  This is why her knee was dislocating posteriorly.  She had stretched out her posterior capsule.  Femoral and tibial implants were found to be stable to underlying bone and were removed using osteotomes and mallet and a bone tamp.  There was minimal bone loss.  The intramedullary drill was then used on the tibia and reamed up to 11 mm.  The IM cutting block was attached and a osteotomy was cut with oscillating saw.  Then, the tibial baseplate was placed and size E was selected.  It was pinned into place and the tibia was prepared using a tibial cone and keel.  Then the trial tibial implant was impacted.  Then the femoral canal was opened using intramedullary reamer.  Then the distal cutting block was attached and the osteotomy was completed with oscillating saw.  Then the 4 in 1 cutting block was attached to the reamer and the cuts were completed.  Then trial femoral implant was implanted and the hinge mechanism was inserted and knee was ranged.  It was found to be stable in all planes.                The trial components were then removed and the final implants were confirmed and opened.  The bone surfaces were then irrigated and dried.  The tibial augment was cemented on the  undersurface of the tibial implant into place.  Palacos cement was then placed on the cut bone surfaces and back side of the implants.  The implants were then impacted into place, and the trial polyethylene spacer was placed and the knee was placed into extension.  A stack of towels was placed under the ankle and the cement was allowed to harden. The tourniquet was then dropped.  Hemostasis was obtained.  The wound was then irrigated with the pulse lavage irrigation system with a 3 liter mixture of betadine and normal saline.  The local mixture was injected throughout the knee for post-operative pain control.  The final polyethylene implant was then inserted.   The hinged knee mechanism was inserted and torqued to the appropriate tension.  The knee was flexed to 90 degrees and the arthrotomy was closed with #1 Strata-fix suture.The deep dermis was closed with #1 strata-fix, and the skin was closed with 3-0 Monocryl suture.  Skin glue was applied and sterile dressing were applied.              Sponge and needle counts were completed and were correct.  The patient was awaken from anesthetic and was returned to recovery in stable condition.    Simba Cavazos MD  Date: 10/9/2023    Time: 18:17 EDT

## 2023-10-10 LAB
ANION GAP SERPL CALCULATED.3IONS-SCNC: 9.5 MMOL/L (ref 5–15)
BUN SERPL-MCNC: 13 MG/DL (ref 8–23)
BUN/CREAT SERPL: 18.8 (ref 7–25)
CALCIUM SPEC-SCNC: 9.2 MG/DL (ref 8.6–10.5)
CHLORIDE SERPL-SCNC: 101 MMOL/L (ref 98–107)
CO2 SERPL-SCNC: 27.5 MMOL/L (ref 22–29)
CREAT SERPL-MCNC: 0.69 MG/DL (ref 0.57–1)
EGFRCR SERPLBLD CKD-EPI 2021: 87.3 ML/MIN/1.73
GLUCOSE SERPL-MCNC: 149 MG/DL (ref 65–99)
HCT VFR BLD AUTO: 31.3 % (ref 34–46.6)
HGB BLD-MCNC: 10.4 G/DL (ref 12–15.9)
POTASSIUM SERPL-SCNC: 4 MMOL/L (ref 3.5–5.2)
SODIUM SERPL-SCNC: 138 MMOL/L (ref 136–145)

## 2023-10-10 PROCEDURE — 97116 GAIT TRAINING THERAPY: CPT

## 2023-10-10 PROCEDURE — 97161 PT EVAL LOW COMPLEX 20 MIN: CPT

## 2023-10-10 PROCEDURE — 25010000002 CEFAZOLIN IN DEXTROSE 2-4 GM/100ML-% SOLUTION: Performed by: ORTHOPAEDIC SURGERY

## 2023-10-10 PROCEDURE — 85014 HEMATOCRIT: CPT | Performed by: ORTHOPAEDIC SURGERY

## 2023-10-10 PROCEDURE — 85018 HEMOGLOBIN: CPT | Performed by: ORTHOPAEDIC SURGERY

## 2023-10-10 PROCEDURE — 80048 BASIC METABOLIC PNL TOTAL CA: CPT | Performed by: ORTHOPAEDIC SURGERY

## 2023-10-10 RX ORDER — ACETAMINOPHEN 325 MG/1
650 TABLET ORAL EVERY 4 HOURS PRN
Status: DISCONTINUED | OUTPATIENT
Start: 2023-10-10 | End: 2023-10-11 | Stop reason: HOSPADM

## 2023-10-10 RX ADMIN — CETIRIZINE HYDROCHLORIDE 10 MG: 10 TABLET ORAL at 08:00

## 2023-10-10 RX ADMIN — Medication 400 MCG: at 08:00

## 2023-10-10 RX ADMIN — CEFAZOLIN SODIUM 2000 MG: 2 INJECTION, SOLUTION INTRAVENOUS at 00:43

## 2023-10-10 RX ADMIN — Medication 10 ML: at 21:12

## 2023-10-10 RX ADMIN — MULTIPLE VITAMINS W/ MINERALS TAB 1 TABLET: TAB at 08:00

## 2023-10-10 RX ADMIN — ACETAMINOPHEN 650 MG: 325 TABLET, FILM COATED ORAL at 15:05

## 2023-10-10 RX ADMIN — Medication 10 ML: at 08:00

## 2023-10-10 RX ADMIN — ACETAMINOPHEN 650 MG: 325 TABLET, FILM COATED ORAL at 19:03

## 2023-10-10 RX ADMIN — FAMOTIDINE 40 MG: 20 TABLET ORAL at 08:00

## 2023-10-10 RX ADMIN — CEFAZOLIN SODIUM 2000 MG: 2 INJECTION, SOLUTION INTRAVENOUS at 07:59

## 2023-10-10 RX ADMIN — FLUTICASONE PROPIONATE 2 SPRAY: 50 SPRAY, METERED NASAL at 08:00

## 2023-10-10 RX ADMIN — ACETAMINOPHEN 325 MG: 325 TABLET, FILM COATED ORAL at 10:46

## 2023-10-10 RX ADMIN — FLUTICASONE PROPIONATE 2 SPRAY: 50 SPRAY, METERED NASAL at 21:12

## 2023-10-10 RX ADMIN — LOSARTAN POTASSIUM 50 MG: 50 TABLET, FILM COATED ORAL at 08:00

## 2023-10-10 RX ADMIN — AMLODIPINE BESYLATE 2.5 MG: 2.5 TABLET ORAL at 08:00

## 2023-10-10 RX ADMIN — ASPIRIN 81 MG: 81 TABLET, COATED ORAL at 08:00

## 2023-10-10 RX ADMIN — ASPIRIN 81 MG: 81 TABLET, COATED ORAL at 21:17

## 2023-10-10 NOTE — DISCHARGE PLACEMENT REQUEST
"Diana Bianchi (83 y.o. Female)       Date of Birth   1940    Social Security Number       Address   [address] [address]    Home Phone   [phone]    MRN   [id number]       Confucianist   Anabaptism    Marital Status   Single                            Admission Date   10/9/23    Admission Type   Elective    Admitting Provider   Simba Cavazos MD    Attending Provider   Simba Cavazos MD    Department, Room/Bed   [address], P795/1       Discharge Date       Discharge Disposition       Discharge Destination                                 Attending Provider: Simba Cavazos MD    Allergies: Clavulanic Acid, Codeine, Cortisone, Fentanyl, Hydrocodone-acetaminophen, Morphine And Related, Oxycodone-acetaminophen, Peanut (Diagnostic), Peanut Oil, Peanut-containing Drug Products, Prednisone, Statins, Zithromax [Azithromycin], Acid Blockers Support    Isolation: None   Infection: None   Code Status: CPR    Ht: 158.8 cm (62.5\")   Wt: 74.9 kg (165 lb 2 oz)    Admission Cmt: None   Principal Problem: Failed total left knee replacement, initial encounter [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]                   Active Insurance as of 10/9/2023       Primary Coverage       Payor Plan Insurance Group Employer/Plan Group    ANTHEM MEDICARE REPLACEMENT ANTHEM MEDICARE ADVANTAGE [id number]       Payor Plan Address Payor Plan Phone Number Payor Plan Fax Number Effective Dates    [address] [phone]  1/1/2016 - None Entered    [facility] [address]         Subscriber Name Subscriber Birth Date Member ID       DIANA BIANCHI 1940 [id number]                     Emergency Contacts        (Rel.) Home Phone Work Phone Mobile Phone    Santhosh Bianchi (Son) [phone] -- [phone]                "

## 2023-10-10 NOTE — ANESTHESIA POSTPROCEDURE EVALUATION
Patient: Diana Bianchi    Procedure Summary       Date: 10/09/23 Room / Location: [facility] OR  / [facility] MAIN OR    Anesthesia Start: 1620 Anesthesia Stop: 1850    Procedure: LEFT TOTAL KNEE ARTHROPLASTY REVISION (Left: Knee) Diagnosis:     Surgeons: Simba Cavazos MD Provider: Bryn Jha MD    Anesthesia Type: general ASA Status: 3            Anesthesia Type: general    Vitals  Vitals Value Taken Time   /70 10/09/23 2000   Temp 37.1 øC (98.7 øF) 10/09/23 1848   Pulse 64 10/09/23 2020   Resp 16 10/09/23 2000   SpO2 99 % 10/09/23 2020   Vitals shown include unfiled device data.        Post Anesthesia Care and Evaluation    Patient location during evaluation: bedside  Patient participation: complete - patient participated  Level of consciousness: awake and alert  Pain score: 0  Pain management: adequate    Airway patency: patent  Anesthetic complications: No anesthetic complications    Cardiovascular status: acceptable  Respiratory status: acceptable  Hydration status: acceptable    Comments: /70   Pulse 71   Temp 37.1 øC (98.7 øF) (Oral)   Resp 16   SpO2 98%

## 2023-10-10 NOTE — CASE MANAGEMENT/SOCIAL WORK
Continued Stay Note  [facility]     Patient Name: Diana Bianchi  MRN: [id number]  Today's Date: 10/10/2023    Admit Date: 10/9/2023    Plan: [facility] pre-cert pending- [facility]za esquivel booked for 10/11 @ 1500   Discharge Plan       Row Name 10/10/23 1417       Plan    Plan [facility] pre-[facility] pending- Iza velazco/jefry esquivel booked for 10/11 @ 1500    Patient/Family in Agreement with Plan yes    Plan Comments Spoke with patient at bedside. Introduced self and explained role. Facesheet verified. Patient lives alone in a 1st floor apartment. At baseline, patient is IADLS. She does have a cane, rollator, 3 in 1 commode, shower chair, and grab bars. She does not have any family in [place] and is interested in rehab @ [place]. She would like either, [facility], [facility] [place], or [facility] [place]. [facility] can accept pending [facility] pre-cert. Patient updated and agreeable. E-mail sent to pre-cert team asking that pre-cert be initiated. Transfer packet in [facility]bie and pharmacy updated. Iza velazco/jefry esquivel tentaively booked for 10/11 (Wednesday) @ 1500 reservation [id number].                   Discharge Codes    No documentation.                       Deisy Zelaya RN

## 2023-10-10 NOTE — PLAN OF CARE
Goal Outcome Evaluation:  Plan of Care Reviewed With: patient        Progress: improving  Outcome Evaluation: Pt remains stable. Was immediately able to ambulate to bathroom upon arrival from PACU, walker utilized and 1 assist. Voiding without difficulty. Minimal c/o pain, nerve block still in effect. Nausea has started to lessen, worsens with movement. Fluids infusing. Educated on htn management. CCP to see for rehab placement upon dc.

## 2023-10-10 NOTE — PROGRESS NOTES
"                                                                                                            Orthopedic Progress Note    Subjective :   Patient seen and examined. Resting comfortably. No issues overnight. Pain controlled.  Ambulated yesterday.  Slightly hypertensive overnight.  Blood pressure this morning stabilizing.  Educated on blood pressure management.    Objective :    Vital signs in last 24 hours:  Temp:  [97.5 øF (36.4 øC)-98.7 øF (37.1 øC)] 98.2 øF (36.8 øC)  Heart Rate:  [54-76] 66  Resp:  [14-18] 18  BP: (105-188)/(58-98) 142/64  Vitals:    10/09/23 2045 10/09/23 2230 10/10/23 0110 10/10/23 0600   BP: (!) 187/83 (!) 186/80 174/77 142/64   BP Location: Left arm   Right arm   Patient Position: Sitting  Lying Lying   Pulse: 68  71 66   Resp: 18  18 18   Temp: 97.5 øF (36.4 øC)  97.6 øF (36.4 øC) 98.2 øF (36.8 øC)   TempSrc: Oral  Oral Oral   SpO2: 96%  91% 91%   Weight: 74.9 kg (165 lb 2 oz)      Height: 158.8 cm (62.5\")          PHYSICAL EXAM:  Patient is calm, in no acute distress, awake and oriented x 3.  Dressing clean, dry and intact.  No signs of infection.  Swelling is appropriate.  Ecchymosis is appropriate in amount.  Homans test is negative.  Patient is neurovascularly intact distally.  EHL,FHL,TA,GS are intact  DP/TP 2+    LABS:  Results from last 7 days   Lab Units 10/10/23  0507   HEMOGLOBIN g/dL 10.4*   HEMATOCRIT % 31.3*     Results from last 7 days   Lab Units 10/10/23  0507   SODIUM mmol/L 138   POTASSIUM mmol/L 4.0   CHLORIDE mmol/L 101   CO2 mmol/L 27.5   BUN mg/dL 13   CREATININE mg/dL 0.69   GLUCOSE mg/dL 149*   CALCIUM mg/dL 9.2         Study Result    Narrative & Impression   LEFT KNEE     HISTORY: Postop, knee pain.     FINDINGS: 2 views of the left knee demonstrates a total knee prosthesis  which appears to be in satisfactory position. There is no evidence of  fracture. Extensive vascular calcification is noted.      This result has not been signed. Information might " be incomplete.         ASSESSMENT:  Status post left Total knee revision arthroplasty, POD# 1    Plan:  Continue Physical Therapy, WBAT. ROM as tolerated with Physical therapy  Continue SCDs, Continue with ECASA 81mg one tab PO BID x30 days for DVT prophylaxis.    Dispo planning for rehab placement when medically stable.  Case management has been consulted.  Patient lives alone.  She is a fall risk.  She would not be able to safely maneuver her home.    Follow up in the office in 2 weeks after discharge.     Patrice Ceja PA-C    Date: 10/10/2023  Time: 06:57 EDT

## 2023-10-10 NOTE — CASE MANAGEMENT/SOCIAL WORK
Continued Stay Note  [facility]     Patient Name: Diana Bianchi  MRN: [id number]  Today's Date: 10/10/2023    Admit Date: 10/9/2023    Plan: [facility] pre-cert pending- Iza esquivel booked for 10/11 @ 1500   Discharge Plan       Row Name 10/10/23 1435       Plan    Plan Comments Pre-cert submission for Ms. Bianchi' insurance will need to be completed by the SNF and is not handled through [facility].  [facility] and [facility] for [facility] notified.  Verna WEST      Row Name            Plan     Patient/Family in Agreement with Plan     Plan Comments                    Discharge Codes    No documentation.                       Verna Hayes RN

## 2023-10-10 NOTE — PLAN OF CARE
Goal Outcome Evaluation:  Plan of Care Reviewed With: patient        Progress: improving  Outcome Evaluation: VSS, NVI, DRESSING C/D/I, AMBULATING ASSIST OF 1 WITH WALKER, VOIDING PER BRP, AWAITING PRECERT FOR SNF, PAIN CONTROLLED WITH TYLENOL, EDUCATED ON BP MEDS AND MONITORING

## 2023-10-10 NOTE — PLAN OF CARE
Goal Outcome Evaluation:  Plan of Care Reviewed With: patient      Patient is an 83 y.o. female POD 1 L TKA revision and is WBAT. Patient is independent at baseline and lives alone with no family/friends in town to assist her. Pt presents to PT with expected post-op weakness and impaired functional mobility. Today, patient performed bed mobility with CGA - used UE to lift LLE to EOB, required CGA for transfers, and ambulated 15ft with rwx and CGA. Pt cued for upright posture, poor LLE WBing tolerance 2/2 pain. Pt c/o more pain in L upper thigh throughout session, wincing with each step. Gait distance limited by fatigue and increased pain. Pt agreeable to sitting UIC following session, left with needs met and encouraged to continue getting up with nsg to bathroom and ambulating in room with assist as tolerated. Patient will benefit from skilled PT services acutely to address functional deficits as well as improve level of independence prior to discharge. Anticipate SNF upon DC.

## 2023-10-10 NOTE — THERAPY EVALUATION
"Patient Name: Diana Bianchi  : 1940    MRN: [id number]                              Today's Date: 10/10/2023       Admit Date: 10/9/2023    Visit Dx: No diagnosis found.  Patient Active Problem List   Diagnosis    Hypertension    Coronary artery disease involving native coronary artery of native heart without angina pectoris    Medicare annual wellness visit, subsequent    GERD (gastroesophageal reflux disease)    PVCs (premature ventricular contractions)    Arthritis    Aortectasia    Arthritis of midfoot    Chronic hyponatremia    Chronic back pain    Depression    Deprivation amblyopia of left eye    Fatigue    Foraminal stenosis of lumbosacral region    History of DVT (deep vein thrombosis)    Lumbar degenerative disc disease    Solitary pulmonary nodule    Sinus bradycardia    Peripheral neuropathy    Aortic valve calcification    Failed total left knee replacement, initial encounter    Surgery, elective     Past Medical History:   Diagnosis Date    Acute deep vein thrombosis (DVT) 2022    RIGHT LEG    Aortic calcification 2021    Moderate per echo    ASCVD (arteriosclerotic cardiovascular disease)     MI , treated in [place] with tPA.  Cath in  in [place], report unavailable, but no stenting was done.    Bradycardia     Chronic hyponatremia     Claustrophobia     Concussion     HISTORY OF    Depression     Dizziness     Endometrial cancer     Esophagitis 2022    GERD (gastroesophageal reflux disease)     History of COVID-19 2021    Hypertension     Irregular heart beat     Limited mobility     RIGHT FOOT/RIGHT ANKLE    Myocardial infarction 1991    OA (osteoarthritis)     RIGHT FOOT    MICHAELLE (obstructive sleep apnea)     STATES \"BORDERLINE: NO MACHINE USE    Peripheral neuropathy 10/12/2022    PONV (postoperative nausea and vomiting)     N/V     Presence of intraocular lens 2021    PVCs (premature ventricular contractions) 2020    Right foot pain     " Seasonal allergies      Past Surgical History:   Procedure Laterality Date    ANKLE FUSION Right 06/28/2021    Procedure: RIGHT TRIPLE ARTHRODESIS 1ST TARSOMETATARSAL JOINT ARTHRODESIS ACHILLES LENGTHENING;  Surgeon: Lyle Rm Jr., MD;  Location: [facility] OR [facility];  Service: Orthopedics;  Laterality: Right;    CARDIAC CATHETERIZATION  1991 sept    CATARACT EXTRACTION WITH INTRAOCULAR LENS IMPLANT Bilateral     COLONOSCOPY      ENDOSCOPY      FOOT SURGERY Left     METAL AND PINS IN LEFT FOOT    HYSTERECTOMY      TOTAL    KNEE ARTHROPLASTY Bilateral     KNEE SURGERY      LUMBAR FUSION      3-4,4-5    TONSILLECTOMY      TOTAL KNEE ARTHROPLASTY REVISION Left 10/9/2023    Procedure: LEFT TOTAL KNEE ARTHROPLASTY REVISION;  Surgeon: Simba Cavazos MD;  Location: [facility] MAIN OR;  Service: Orthopedics;  Laterality: Left;      General Information       Row Name 10/10/23 [facility]          Physical Therapy Time and Intention    Document Type evaluation  -     Mode of Treatment individual therapy;physical therapy  -       Row Name 10/10/23 [facility]          General Information    Patient Profile Reviewed yes  -     Prior Level of Function independent:;gait;transfer;bed mobility  -     Existing Precautions/Restrictions fall  -     Barriers to Rehab none identified  -       Row Name 10/10/23 1231          Living Environment    People in Home alone  -       Row Name 10/10/23 1231          Cognition    Orientation Status (Cognition) oriented x 4  -       Row Name 10/10/23 1231          Safety Issues, Functional Mobility    Impairments Affecting Function (Mobility) balance;endurance/activity tolerance;strength;pain;range of motion (ROM)  -               User Key  (r) = Recorded By, (t) = Taken By, (c) = Cosigned By      Initials Name Provider Type     Ramila Hannon PT Physical Therapist                   Mobility       Row Name 10/10/23 1232          Bed Mobility    Bed Mobility supine-sit  -     Supine-Sit  Cherry (Bed Mobility) contact guard;verbal cues  -     Assistive Device (Bed Mobility) bed rails;head of bed elevated  -     Comment, (Bed Mobility) Used UE to move LLE to EOB  -       Row Name 10/10/23 1232          Sit-Stand Transfer    Sit-Stand Cherry (Transfers) contact guard;verbal cues  -     Assistive Device (Sit-Stand Transfers) walker, front-wheeled  -       Row Name 10/10/23 1232          Gait/Stairs (Locomotion)    [place] Level (Gait) contact guard;verbal cues  -     Assistive Device (Gait) walker, front-wheeled  -     Distance in Feet (Gait) 15ft  -     Deviations/Abnormal Patterns (Gait) antalgic;nicole decreased;gait speed decreased;stride length decreased  -     Bilateral Gait Deviations forward flexed posture;heel strike decreased  -     Left Sided Gait Deviations weight shift ability decreased  -       Row Name 10/10/23 1232          Mobility    Extremity Weight-bearing Status left lower extremity  -     Left Lower Extremity (Weight-bearing Status) weight-bearing as tolerated (WBAT)  -               User Key  (r) = Recorded By, (t) = Taken By, (c) = Cosigned By      Initials Name Provider Type     Ramila Hannon, PT Physical Therapist                   Obj/Interventions       Row Name 10/10/23 1233          Range of Motion Comprehensive    General Range of Motion lower extremity range of motion deficits identified  -     Comment, General Range of Motion Expected post-op ROM deficits, L knee flexion grossly 70 degrees with heel slides  -       Row Name 10/10/23 1233          Strength Comprehensive (MMT)    General Manual Muscle Testing (MMT) Assessment lower extremity strength deficits identified  -     Comment, General Manual Muscle Testing (MMT) Assessment Expected post-op strength deficits, RLE grossly 4+/5, LLE grossly 4-/5  -       Row Name 10/10/23 1233          Motor Skills    Therapeutic Exercise --  5 reps TKA protocol  -[facility]  Name 10/10/23 1233          Balance    Balance Assessment sitting static balance;sitting dynamic balance;standing static balance;standing dynamic balance  -     Static Sitting Balance standby assist  -     Dynamic Sitting Balance standby assist  -     Position, Sitting Balance unsupported;sitting edge of bed  -     Static Standing Balance contact guard;verbal cues  -     Dynamic Standing Balance contact guard;verbal cues  -     Position/Device Used, Standing Balance supported;walker, front-wheeled  -     Balance Interventions sitting;standing;sit to stand;supported;dynamic;static  -       Row Name 10/10/23 1233          Sensory Assessment (Somatosensory)    Sensory Assessment (Somatosensory) LE sensation intact  -               User Key  (r) = Recorded By, (t) = Taken By, (c) = Cosigned By      Initials Name Provider Type     Ramila Hannon, PT Physical Therapist                   Goals/Plan       Row Name 10/10/23 1238          Bed Mobility Goal 1 (PT)    Activity/Assistive Device (Bed Mobility Goal 1, PT) bed mobility activities, all  -     [place] Level/Cues Needed (Bed Mobility Goal 1, PT) standby assist  -     Time Frame (Bed Mobility Goal 1, PT) 1 week  -       Row Name 10/10/23 1238          Transfer Goal 1 (PT)    Activity/Assistive Device (Transfer Goal 1, PT) transfers, all  -     [place] Level/Cues Needed (Transfer Goal 1, PT) standby assist  -     Time Frame (Transfer Goal 1, PT) 1 week  -       Row Name 10/10/23 1238          Gait Training Goal 1 (PT)    Activity/Assistive Device (Gait Training Goal 1, PT) gait (walking locomotion)  -     [place] Level (Gait Training Goal 1, PT) standby assist  -     Distance (Gait Training Goal 1, PT) 80ft  -     Time Frame (Gait Training Goal 1, PT) 1 week  -       Row Name 10/10/23 1238          Therapy Assessment/Plan (PT)    Planned Therapy Interventions (PT) balance training;bed mobility training;gait  training;home exercise program;patient/family education;strengthening;ROM (range of motion);transfer training  -               User Key  (r) = Recorded By, (t) = Taken By, (c) = Cosigned By      Initials Name Provider Type     Ramila Hannon, PT Physical Therapist                   Clinical Impression       Row Name 10/10/23 1234          Pain    Pre/Posttreatment Pain Comment C/o L knee pain - primarily upper thigh pain, did not rate  -       Row Name 10/10/23 1234          Plan of Care Review    Plan of Care Reviewed With patient  -     Outcome Evaluation Patient is an 83 y.o. female POD 1 L TKA revision and is WBAT. Patient is independent at baseline and lives alone with no family/friends in town to assist her. Pt presents to PT with expected post-op weakness and impaired functional mobility. Today, patient performed bed mobility with CGA - used UE to lift LLE to EOB, required CGA for transfers, and ambulated 15ft with rwx and CGA. Pt cued for upright posture, poor LLE WBing tolerance 2/2 pain. Pt c/o more pain in L upper thigh throughout session, wincing with each step. Gait distance limited by fatigue and increased pain. Pt agreeable to sitting UIC following session, left with needs met and encouraged to continue getting up with nsg to bathroom and ambulating in room with assist as tolerated. Patient will benefit from skilled PT services acutely to address functional deficits as well as improve level of independence prior to discharge. Anticipate SNF upon DC.  -       Row Name 10/10/23 1238          Therapy Assessment/Plan (PT)    Patient/Family Therapy Goals Statement (PT) Return to [facility]  -     Rehab Potential (PT) good, to achieve stated therapy goals  -     Criteria for Skilled Interventions Met (PT) yes  -     Therapy Frequency (PT) daily  -       Row Name 10/10/23 123          Vital Signs    O2 Delivery Pre Treatment room air  -     O2 Delivery Intra Treatment room air  -     O2  Delivery Post Treatment room air  -       Row Name 10/10/23 1234          Positioning and Restraints    Pre-Treatment Position in bed  -     Post Treatment Position chair  -     In Chair reclined;call light within reach;encouraged to call for assist;notified nsg;exit alarm on  -               User Key  (r) = Recorded By, (t) = Taken By, (c) = Cosigned By      Initials Name Provider Type     Ramila Hannon PT Physical Therapist                   Outcome Measures       Row Name 10/10/23 1239 10/10/23 0800       How much help from another person do you currently need...    Turning from your back to your side while in flat bed without using bedrails? 3  - 3  -MN    Moving from lying on back to sitting on the side of a flat bed without bedrails? 3  - 3  -MN    Moving to and from a bed to a chair (including a wheelchair)? 3  - 3  -MN    Standing up from a chair using your arms (e.g., wheelchair, bedside chair)? 3  - 3  -MN    Climbing 3-5 steps with a railing? 2  - 2  -MN    To walk in hospital room? 3  - 3  -MN    AM-PAC 6 Clicks Score (PT) 17  - 17  -MN    Highest level of mobility 5 --> Static standing  - 5 --> Static standing  -MN      Row Name 10/10/23 1239          Functional Assessment    Outcome Measure Options AM-PAC 6 Clicks Basic Mobility (PT)  -               User Key  (r) = Recorded By, (t) = Taken By, (c) = Cosigned By      Initials Name Provider Type    Cecilia Hernandez RN Registered Nurse     Ramila Hannon PT Physical Therapist                                 Physical Therapy Education       Title: PT OT SLP Therapies (Done)       Topic: Physical Therapy (Done)       Point: Mobility training (Done)       Learning Progress Summary             Patient Acceptance, E,TB,D, VU,NR by  at 10/10/2023 1239                         Point: Home exercise program (Done)       Learning Progress Summary             Patient Acceptance, E,TB,D, VU,NR by  at 10/10/2023 1239                          Point: Body mechanics (Done)       Learning Progress Summary             Patient Acceptance, E,TB,D, VU,NR by  at 10/10/2023 1239                         Point: Precautions (Done)       Learning Progress Summary             Patient Acceptance, E,TB,D, VU,NR by  at 10/10/2023 1239                                         User Key       Initials Effective Dates Name Provider Type Discipline     04/08/22 -  Ramila Hannon, PT Physical Therapist PT                  PT Recommendation and Plan  Planned Therapy Interventions (PT): balance training, bed mobility training, gait training, home exercise program, patient/family education, strengthening, ROM (range of motion), transfer training  Plan of Care Reviewed With: patient  Outcome Evaluation: Patient is an 83 y.o. female POD 1 L TKA revision and is WBAT. Patient is independent at baseline and lives alone with no family/friends in town to assist her. Pt presents to PT with expected post-op weakness and impaired functional mobility. Today, patient performed bed mobility with CGA - used UE to lift LLE to EOB, required CGA for transfers, and ambulated 15ft with rwx and CGA. Pt cued for upright posture, poor LLE WBing tolerance 2/2 pain. Pt c/o more pain in L upper thigh throughout session, wincing with each step. Gait distance limited by fatigue and increased pain. Pt agreeable to sitting UIC following session, left with needs met and encouraged to continue getting up with nsg to bathroom and ambulating in room with assist as tolerated. Patient will benefit from skilled PT services acutely to address functional deficits as well as improve level of independence prior to discharge. Anticipate SNF upon DC.     Time Calculation:   PT Evaluation Complexity  History, PT Evaluation Complexity: 1-2 personal factors and/or comorbidities  Examination of Body Systems (PT Eval Complexity): total of 3 or more elements  Clinical Presentation (PT Evaluation  Complexity): stable  Clinical Decision Making (PT Evaluation Complexity): low complexity  Overall Complexity (PT Evaluation Complexity): low complexity     PT Charges       Row Name 10/10/23 1239             Time Calculation    Start Time 1103  -      Stop Time 1123  -      Time Calculation (min) 20 min  -      PT Received On 10/10/23  -      PT - Next Appointment 10/11/23  -      PT Goal Re-Cert Due Date 10/17/23  -         Time Calculation- PT    Total Timed Code Minutes- PT 17 minute(s)  -         Timed Charges    59787 - PT Therapeutic Exercise Minutes 4  -      93606 - Gait Training Minutes  8  -      58118 - PT Therapeutic Activity Minutes 5  -         Untimed Charges    PT Eval/Re-eval Minutes 4  -         Total Minutes    Timed Charges Total Minutes 17  -      Untimed Charges Total Minutes 4  -       Total Minutes 21  -                User Key  (r) = Recorded By, (t) = Taken By, (c) = Cosigned By      Initials Name Provider Type     Ramila Hannon, PT Physical Therapist                  Therapy Charges for Today       Code Description Service Date Service Provider Modifiers Qty    96054818937 HC GAIT TRAINING EA 15 MIN 10/10/2023 Ramila Hannon, PT GP 1    71564363041 HC PT EVAL LOW COMPLEXITY 3 10/10/2023 Ramila Hannon, PT GP 1            PT G-Codes  Outcome Measure Options: AM-PAC 6 Clicks Basic Mobility (PT)  AM-PAC 6 Clicks Score (PT): 17  PT Discharge Summary  Anticipated Discharge Disposition (PT): skilled nursing facility    Ramila Hannon PT  10/10/2023

## 2023-10-11 VITALS
DIASTOLIC BLOOD PRESSURE: 65 MMHG | HEART RATE: 73 BPM | TEMPERATURE: 98.3 F | WEIGHT: 165.12 LBS | BODY MASS INDEX: 29.26 KG/M2 | HEIGHT: 63 IN | SYSTOLIC BLOOD PRESSURE: 112 MMHG | RESPIRATION RATE: 16 BRPM | OXYGEN SATURATION: 96 %

## 2023-10-11 LAB
HCT VFR BLD AUTO: 28.8 % (ref 34–46.6)
HGB BLD-MCNC: 9.7 G/DL (ref 12–15.9)

## 2023-10-11 PROCEDURE — 85014 HEMATOCRIT: CPT | Performed by: ORTHOPAEDIC SURGERY

## 2023-10-11 PROCEDURE — 85018 HEMOGLOBIN: CPT | Performed by: ORTHOPAEDIC SURGERY

## 2023-10-11 RX ORDER — NALOXONE HYDROCHLORIDE 4 MG/.1ML
SPRAY NASAL
Qty: 2 EACH | Refills: 0 | Status: SHIPPED | OUTPATIENT
Start: 2023-10-11

## 2023-10-11 RX ORDER — ASPIRIN 81 MG/1
81 TABLET ORAL 2 TIMES DAILY
Qty: 60 TABLET | Refills: 0 | Status: SHIPPED | OUTPATIENT
Start: 2023-10-11 | End: 2023-11-10

## 2023-10-11 RX ORDER — HYDROMORPHONE HYDROCHLORIDE 2 MG/1
2 TABLET ORAL
Qty: 42 TABLET | Refills: 0 | Status: SHIPPED | OUTPATIENT
Start: 2023-10-11 | End: 2023-10-16

## 2023-10-11 RX ADMIN — ACETAMINOPHEN 650 MG: 325 TABLET, FILM COATED ORAL at 03:29

## 2023-10-11 RX ADMIN — FAMOTIDINE 40 MG: 20 TABLET ORAL at 08:45

## 2023-10-11 RX ADMIN — MULTIPLE VITAMINS W/ MINERALS TAB 1 TABLET: TAB at 08:46

## 2023-10-11 RX ADMIN — LOSARTAN POTASSIUM 50 MG: 50 TABLET, FILM COATED ORAL at 08:46

## 2023-10-11 RX ADMIN — ACETAMINOPHEN 650 MG: 325 TABLET, FILM COATED ORAL at 08:45

## 2023-10-11 RX ADMIN — AMLODIPINE BESYLATE 2.5 MG: 2.5 TABLET ORAL at 08:46

## 2023-10-11 RX ADMIN — Medication 10 ML: at 08:46

## 2023-10-11 RX ADMIN — FLUTICASONE PROPIONATE 2 SPRAY: 50 SPRAY, METERED NASAL at 08:46

## 2023-10-11 RX ADMIN — Medication 400 MCG: at 08:46

## 2023-10-11 RX ADMIN — ASPIRIN 81 MG: 81 TABLET, COATED ORAL at 08:46

## 2023-10-11 NOTE — PROGRESS NOTES
Orthopedic Progress Note    Subjective :   Patient seen and examined. Resting comfortably. No issues overnight. Pain controlled. Walked yesterday with PT. she ambulated with walker x1 assist.  Awaiting precertification for rehab placement.  Plan is for possible discharge to [facility].    Objective :    Vital signs in last 24 hours:  Temp:  [97.6 øF (36.4 øC)-98.4 øF (36.9 øC)] 97.9 øF (36.6 øC)  Heart Rate:  [59-84] 60  Resp:  [16-17] 16  BP: (125-172)/(61-81) 125/61  Vitals:    10/10/23 1117 10/10/23 1827 10/10/23 2107 10/11/23 0520   BP: 154/81 163/75 172/67 125/61   BP Location: Right arm Left arm Left arm Left arm   Patient Position: Lying Lying Lying Lying   Pulse: 59 84 61 60   Resp: 17 16 16 16   Temp: 98.3 øF (36.8 øC) 98.4 øF (36.9 øC) 97.6 øF (36.4 øC) 97.9 øF (36.6 øC)   TempSrc: Oral Oral Oral Oral   SpO2: 95% 94% 95% 96%   Weight:       Height:           PHYSICAL EXAM:  Patient is calm, in no acute distress, awake and oriented x 3.  Dressing clean, dry and intact.  No signs of infection.  Swelling is appropriate.  Ecchymosis is appropriate in amount.  Homans test is negative.  Patient is neurovascularly intact distally.  EHL,FHL,TA,GS are intact  DP/TP 2+    LABS:  Results from last 7 days   Lab Units 10/11/23  0527   HEMOGLOBIN g/dL 9.7*   HEMATOCRIT % 28.8*     Results from last 7 days   Lab Units 10/10/23  0507   SODIUM mmol/L 138   POTASSIUM mmol/L 4.0   CHLORIDE mmol/L 101   CO2 mmol/L 27.5   BUN mg/dL 13   CREATININE mg/dL 0.69   GLUCOSE mg/dL 149*   CALCIUM mg/dL 9.2             ASSESSMENT:  Status post left Total knee revision arthroplasty, POD#2    Plan:  Continue Physical Therapy, WBAT. ROM as tolerated with Physical therapy  Continue SCDs, Continue with ECASA 81mg one tab PO BID x30 days for DVT prophylaxis.     Dispo planning for rehab placement when medically stable.  Case  management has been consulted.  Patient lives alone.  She is a fall risk.  She would not be able to safely maneuver her home.     Follow up in the office in 2 weeks after discharge.     Awaiting precertification.  Case management following.  Plan for discharge to [facility] once pre-CERT is obtained.    Patrice Ceja PA-C    Date: 10/11/2023  Time: 06:05 EDT

## 2023-10-11 NOTE — PLAN OF CARE
Goal Outcome Evaluation:  Plan of Care Reviewed With: patient        Progress: improving  Outcome Evaluation: Pt remains stable. Ambulates with walker use and 1 assist. Voiding per BRP. Minimal pain, tylenol given PRN. Dressing is cdi to L knee. Educated on htn management. If precert obtained, can dc today with [employer] van set up at 1500.

## 2023-10-11 NOTE — PLAN OF CARE
Goal Outcome Evaluation:      PIV dc'd, intact. AVS completed, signed and packet given to . Report given to Vincenzo at [facility]      Problem: Adult Inpatient Plan of Care  Goal: Plan of Care Review  Outcome: Met  Goal: Patient-Specific Goal (Individualized)  Outcome: Met  Goal: Absence of Hospital-Acquired Illness or Injury  Outcome: Met  Intervention: Identify and Manage Fall Risk  Recent Flowsheet Documentation  Taken 10/11/2023 1415 by Leanna Adnrade RN  Safety Promotion/Fall Prevention:   assistive device/personal items within reach   safety round/check completed  Taken 10/11/2023 1200 by Leanna Andrade RN  Safety Promotion/Fall Prevention:   assistive device/personal items within reach   safety round/check completed  Taken 10/11/2023 1000 by Leanna Andrade RN  Safety Promotion/Fall Prevention:   assistive device/personal items within reach   safety round/check completed  Taken 10/11/2023 0837 by Leanna Andrade RN  Safety Promotion/Fall Prevention:   activity supervised   assistive device/personal items within reach   clutter free environment maintained   fall prevention program maintained   gait belt   mobility aid in reach   nonskid shoes/slippers when out of bed   safety round/check completed  Intervention: Prevent Skin Injury  Recent Flowsheet Documentation  Taken 10/11/2023 1415 by Leanna Andrade RN  Body Position: position changed independently  Taken 10/11/2023 1200 by Leanna Andrade RN  Body Position: position changed independently  Taken 10/11/2023 1000 by Leanna Andrade RN  Body Position:   position changed independently   supine  Taken 10/11/2023 0837 by Leanna Andrade RN  Body Position: position changed independently  Skin Protection:   adhesive use limited   incontinence pads utilized   protective footwear used  Intervention: Prevent and Manage VTE (Venous Thromboembolism) Risk  Recent Flowsheet Documentation  Taken 10/11/2023 0837 by Leanna Andrade RN  Activity Management:   ambulated to bathroom    dorsiflexion/plantar flexion performed   back to bed  VTE Prevention/Management:   bilateral   sequential compression devices on  Range of Motion: active ROM (range of motion) encouraged  Goal: Optimal Comfort and Wellbeing  Outcome: Met  Intervention: Provide Person-Centered Care  Recent Flowsheet Documentation  Taken 10/11/2023 0837 by Leanna Andrade RN  Trust Relationship/Rapport:   care explained   thoughts/feelings acknowledged  Goal: Readiness for Transition of Care  Outcome: Met     Problem: Fall Injury Risk  Goal: Absence of Fall and Fall-Related Injury  Outcome: Met  Intervention: Identify and Manage Contributors  Recent Flowsheet Documentation  Taken 10/11/2023 1415 by Leanna Andrade RN  Medication Review/Management: medications reviewed  Taken 10/11/2023 1200 by Leanna Andrade RN  Medication Review/Management: medications reviewed  Taken 10/11/2023 1000 by Leanna Andrade RN  Medication Review/Management: medications reviewed  Taken 10/11/2023 0837 by Leanna Andrade RN  Medication Review/Management: medications reviewed  Self-Care Promotion:   independence encouraged   BADL personal objects within reach  Intervention: Promote Injury-Free Environment  Recent Flowsheet Documentation  Taken 10/11/2023 1415 by Leanna Andrade RN  Safety Promotion/Fall Prevention:   assistive device/personal items within reach   safety round/check completed  Taken 10/11/2023 1200 by Leanna Andrade RN  Safety Promotion/Fall Prevention:   assistive device/personal items within reach   safety round/check completed  Taken 10/11/2023 1000 by Leanna Andrade RN  Safety Promotion/Fall Prevention:   assistive device/personal items within reach   safety round/check completed  Taken 10/11/2023 0837 by Leanna Andrade RN  Safety Promotion/Fall Prevention:   activity supervised   assistive device/personal items within reach   clutter free environment maintained   fall prevention program maintained   gait belt   mobility aid in reach   nonskid shoes/slippers  when out of bed   safety round/check completed     Problem: Hypertension Comorbidity  Goal: Blood Pressure in Desired Range  Outcome: Met  Intervention: Maintain Blood Pressure Management  Recent Flowsheet Documentation  Taken 10/11/2023 1415 by Leanna Andrade RN  Medication Review/Management: medications reviewed  Taken 10/11/2023 1200 by Leanna Andrade RN  Medication Review/Management: medications reviewed  Taken 10/11/2023 1000 by Leanna Andrade RN  Medication Review/Management: medications reviewed  Taken 10/11/2023 0837 by Leanna Andrade RN  Syncope Management: position changed slowly  Medication Review/Management: medications reviewed     Problem: Functional Ability Impaired (Knee Arthroplasty)  Goal: Optimal Functional Ability  Outcome: Met  Intervention: Promote Optimal Functional Status  Recent Flowsheet Documentation  Taken 10/11/2023 0837 by Leanna Andrade RN  Activity Management:   ambulated to bathroom   dorsiflexion/plantar flexion performed   back to bed  Assistive Device Utilized: walker  Self-Care Promotion:   independence encouraged   BADL personal objects within reach     Problem: Ongoing Anesthesia Effects (Knee Arthroplasty)  Goal: Anesthesia/Sedation Recovery  Outcome: Met  Intervention: Optimize Anesthesia Recovery  Recent Flowsheet Documentation  Taken 10/11/2023 1415 by Leanna Andrade RN  Safety Promotion/Fall Prevention:   assistive device/personal items within reach   safety round/check completed  Administration (IS): unable to perform  Taken 10/11/2023 1200 by Leanna Andrade RN  Safety Promotion/Fall Prevention:   assistive device/personal items within reach   safety round/check completed  Administration (IS): self-administered  Taken 10/11/2023 1000 by Leanna Andrade RN  Safety Promotion/Fall Prevention:   assistive device/personal items within reach   safety round/check completed  Administration (IS): unable to perform  Taken 10/11/2023 0837 by Leanna Andrade RN  Safety Promotion/Fall Prevention:    activity supervised   assistive device/personal items within reach   clutter free environment maintained   fall prevention program maintained   gait belt   mobility aid in reach   nonskid shoes/slippers when out of bed   safety round/check completed  Administration (IS): unable to perform     Problem: Pain (Knee Arthroplasty)  Goal: Acceptable Pain Control  Outcome: Met  Intervention: Prevent or Manage Pain  Recent Flowsheet Documentation  Taken 10/11/2023 0837 by Leanna Andrade, RN  Diversional Activities: television     Problem: Postoperative Urinary Retention (Knee Arthroplasty)  Goal: Effective Urinary Elimination  Outcome: Met

## 2023-10-11 NOTE — DISCHARGE INSTRUCTIONS
"    Dr. Simba Cavazos Total Knee Joint Replacement Discharge Instructions:  Office Phone Number: [phone]    \"I would like to thank you for the opportunity for trusting me in taking care of you during your recent surgical procedure. I do not take that trust lightly, and I look forward to the opportunity in providing any future orthopedic care to you or your family.\"    Simba Cavazos M.D.    [phone] personal cell    I. ACTIVITIES:  1. Exercises:  Complete exercise program as taught by the hospital physical therapist 2 times per day  Exercise program will be advanced by the physical therapist  During the day be up ambulating every 2 hours (while awake) for short distances  Complete the ankle pump exercises at least 10 times per hour (while awake)  Elevate legs most of the day the first week post operatively and thereafter elevate legs when in bed and for at least 30 minutes during the day. Caution must be taken to avoid pillow placement under the bend of the knee as this can led to flexion contractures of the knee.  Use cold packs 20-30 minutes approximately 5 times per day. This should be done before and after completing your exercises and at any time you are experiencing pain/ stiffness in your operative extremity.      2. Activities of Daily Living:  No tub baths, hot tubs, or swimming pools for 4 weeks  The clear dressing with thin white gauze strip dressing is waterproof.  You may shower without covering the dressing.  After 7 days you may remove the dressing.  After dressing removal, do not scrub or rub the incision. Allow skin glue to fall off over the next few weeks.  After the dressing is removed, simply let the water run over the incision and pat dry.    II. Restrictions  Do not kneel on operative leg.  Dr. Cavazos will discuss with you when you will be able to drive again.  Weight bearing is as tolerated, and range of motion as tolerated.  First week stay inside on even terrain. May go up and " down stairs one stair at a time utilizing the hand rail.  Once you feel confident, you may venture outside.    III. Precautions:  Everyone that comes near you should wash their hands  Avoid if possible dental work or other elective surgeries within 12 weeks of your surgical procedure.   If dental work or surgical procedure is deemed absolutely necessary within 12 weeks of surgery, you will need to contact Dr. Cavazos office as you will need to take antibiotics 1 hour prior to any dental work (including teeth cleanings).  Dr. Cavazos will prescribe prophylactic antibiotics for all dental procedures for one year  as a precautionary measure to minimize risk of infection.  If you are a diabetic or take immunosuppressive medication, you may have to take prophylactic antibiotics the remainder of your life before dental work.    Avoid sick people. If you must be around someone who is ill, they should wear a mask.  Avoid visits to the Emergency Room or Urgent Care unless you are having a life threatening event.   Dr. Cavazos does not routinely place on stockings, but if you are having swelling in your feet or ankle then you may obtain stockings from your local pharmacy or [facility].   Stockings are to be placed on in the morning and removed at night. Monitor the stockings to ensure that any swelling is not causing the stockings to become too tight. In this case, remove stockings immediately.    IV. INCISION CARE:   Dr. Simba Cavazos takes great care in closing your incision to give you the best opportunity for a healthy incision with minimal scarring. He places sutures below the skin surface that will eventually dissolve.  The incision is then covered with a skin glue which makes the incision water tight, and minimizes bleeding onto the dressing.  No staples are used.  Occasionally one of the buried stitches may come to the skin surface and may need to be removed.  Please resist the temptation of removing the  stitch by yourself.  Dr. Cavazos will be happy to remove it for you.  Bruising around the knee and back of thigh is normal and to be expected.  You may also have pain and or bruising in the thigh where a tourniquet was used.    Please keep dressing in place at least until post-op day 7. You may remove and replace dressing before day 7 if the dressing begins to fall off or becomes saturated. Wash your hands and under your finger nails prior to dressing changes.  After day 7 as long as incision is dry and intact, you may leave the dressing off and open to air.    If dressing must be changed, utilize dry gauze and paper tape. Avoid touching the side of the gauze that goes against the incision with your hands.  No creams or ointments to the incision until permission given by Dr. Cavazos.  Do not touch or pick at the incision, or try to remove any sutures or skin glue.  Check dressing every day and notify surgeon immediately if any of the following signs or symptoms are noted:  Increase in redness  Increase in swelling of the entire extremity that does not go away with elevation.  Notify office that you may have a blood clot.    Drainage oozing from the incision  Pulling apart of the edges of the incision  Increase in overall body temperature (greater than 100.5 degrees)    V. Medications:   1. Anticoagulants: You will be discharged on an anticoagulant. This is a prophylactic medication that helps prevent blood clots during your post-operative period. The type and length of dosage varies based on your individual needs, procedure performed, and Dr. Cavazos' preference.  While taking the anticoagulant, you should avoid taking any additional aspirin than what is prescribed.   Notify surgeon immediately if any amelia bleeding is noted in the urine, stool, emesis, or from the nose or the incision. Blood in the stool will often appear as black rather than red. Blood in urine may appear as pink. Blood in emesis may appear as  brown/black like coffee grounds.  You will need to apply pressure for longer periods of time to any cuts or abrasions to stop bleeding  Avoid alcohol while taking anticoagulants.    2. Stool Softeners: You will be at greater risk of constipation after surgery due to being less mobile and the pain medications.   Take stool softeners as instructed by your surgeon while on pain medications. Over the counter Colace 100 mg 1-2 capsules twice daily.   If stools become too loose or too frequent, please decreases the dosage or stop the stool softener.  If constipation occurs despite use of stool softeners, you are to continue the stool softeners and add a laxative (Milk of Magnesia 1 ounce daily as needed).  If no bowel movement occurs past 3 days, then purchase Magnesium citrate and drink 1/2 bottle every 8 hours (on ice tastes better) until success. If no bowel movement by post-operative day 5,  please call Dr. Cavazos office for further instructions.   You may need to decrease or stop your pain medications if bowel movements to not occur.     Drink plenty of fluids, and eat fruits and vegetables during your recovery time.    3. Pain Medications utilized after surgery are narcotics and the law requires that the following information be given to all patients that are prescribed narcotics:  CLASSIFICATION: Pain medications are called Opioids and are narcotics  LEGALITIES: It is illegal to share narcotics with others and to drive within 24 hours of taking narcotics  POTENTIAL SIDE EFFECTS: Potential side effects of opioids include: nausea, vomiting, itching, dizziness, drowsiness, dry mouth, constipation, and difficulty urinating.  POTENTIAL ADVERSE EFFECTS:   Opioid tolerance can develop with use of pain medications and this simply means that it requires more and more of the medication to control pain; however, this is seen more in patients that use opioids for longer periods of time.  Opioid dependence can develop with use  "of Opioids and this simply means that to stop the medication can cause withdrawal symptoms; however, this is seen with patients that use Opioids for longer periods of time.  Opioid addiction can develop with use of Opioids and the incidence of this is very unlikely in patients who take the medications as ordered and stop the medications as instructed.  Opioid overdose can be dangerous, but is unlikely when the medication is taken as ordered and stopped when ordered. It is important not to mix opioids with alcohol or with and type of sedative such as Benadryl as this can lead to over sedation and respiratory difficulty.  DOSAGE:   Pain medications will need to be taken consistently for the first few days to decrease pain and promote adequate pain relief and participation in physical therapy.  After the initial surgical pain begins to resolve, you may begin to decrease the pain medication. By the end of 6 weeks, you should be off of pain medications except for before physical therapy or to help with pain when attempting to fall asleep.  Pain medications will be tapered to lesser dosages as you are further from your surgical day.  No pain medications will be provided after 3 months from surgery.     Refills will not be given by the office during evening hours, or weekends.  To seek refills on pain medications during the post-operative period, you must call the office 48 hours in advance to request the refill. The office will then notify you when to  the prescription. DO NOT wait until you are out of the medication to request a refill.  They can not be \"called in\" to the pharmacy.      V. FOLLOW-UP VISITS:  You will need to follow up in the office with Dr. Cavazos in 10-14 days.  Please call [phone] if you need to confirm or reschedule your appointment time.   If you have any concerns or suspected complications prior to your follow up visit, please call your surgeons office. Do not wait until your appointment " time if you suspect complications. These will need to be addressed in the office promptly.

## 2023-10-11 NOTE — DISCHARGE SUMMARY
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  Cath in 2005 in [place], report unavailable, but no stenting was done.    Bradycardia     Chronic hyponatremia     Claustrophobia     Concussion     HISTORY OF    Depression     Dizziness     Endometrial cancer     Esophagitis 01/09/2022    GERD (gastroesophageal reflux disease)     History of COVID-19 01/2021    Hypertension     Irregular heart beat     Limited mobility     RIGHT FOOT/RIGHT ANKLE    Myocardial infarction Sept 1991    OA (osteoarthritis)     RIGHT FOOT    MICHAELLE (obstructive sleep apnea)     STATES \"BORDERLINE: NO MACHINE USE    Peripheral neuropathy 10/12/2022    PONV (postoperative nausea and vomiting)     N/V     Presence of intraocular lens 03/18/2021    PVCs (premature ventricular contractions) 09/17/2020    Right foot pain     Seasonal allergies        Discharge Disposition  Rehab Facility or Unit (DC - External)    Procedures Performed  Procedure(s):  LEFT TOTAL KNEE ARTHROPLASTY REVISION       Indication for Admission  Patient is a 83 y.o. female admitted after undergoing the above surgical procedure. They were admitted for post-operative pain control, medical management and physical therapy.  They progressed with physical therapy.    They were deemed stable for discharge.      Consults:   Consults       No orders found from 9/10/2023 to 10/10/2023.            Discharge Instructions:  Patient is weight bearing as tolerated on the operative leg.  Patient is to progress range of motion and ambulation as tolerated.  Use walker as " needed for stability and gait.  May progress to cane as tolerated.  The dressing should be left on knee until post-operative day 7, and then removed.  Dressing is waterproof. Patient may shower.  Use ace wrap as needed for swelling.  Patient will follow-up in the office in 2 weeks.  Call the office at [phone] for any questions or concerns.      Discharge Medications     Discharge Medications        New Medications        Instructions Start Date   HYDROmorphone 2 MG tablet  Commonly known as: DILAUDID   2 mg, Oral, Every 3 Hours PRN      naloxone 4 MG/0.1ML nasal spray  Commonly known as: NARCAN   Call 911. Don't prime. [place] in 1 nostril for overdose. Repeat in 2-3 minutes in other nostril if no or minimal breathing/responsiveness.             Changes to Medications        Instructions Start Date   aspirin 81 MG EC tablet  What changed:   when to take this  additional instructions   81 mg, Oral, 2 Times Daily             Continue These Medications        Instructions Start Date   acetaminophen 500 MG tablet  Commonly known as: TYLENOL   500 mg, Oral, Every 6 Hours PRN      amLODIPine 2.5 MG tablet  Commonly known as: NORVASC   TAKE ONE TABLET BY MOUTH DAILY      CALCIUM MAGNESIUM PO   1 tablet, Oral, Nightly, HOLD PRIOR TO OR      co-enzyme Q-10 30 MG capsule   1 tablet, Oral, Daily, HOLD FOR ONE WEEK FOR SURGERY      famotidine 20 MG tablet  Commonly known as: Pepcid   20 mg, Oral, 2 Times Daily      fexofenadine 180 MG tablet  Commonly known as: ALLEGRA   180 mg, Oral, Daily      fluticasone 50 MCG/ACT nasal spray  Commonly known as: FLONASE   2 sprays, Nasal, 2 times daily, A      folic acid 400 MCG tablet  Commonly known as: FOLVITE   1 tablet, Oral, Daily      furosemide 20 MG tablet  Commonly known as: LASIX   10 mg, Oral, Every Other Day, Then take one half tablet all other days.        losartan 50 MG tablet  Commonly known as: COZAAR   50 mg, Oral, Daily      multivitamin with minerals tablet tablet   1  tablet, Oral, Daily, HOLD FOR SURGERY                Discharge Diet:   Diet Instructions       Advance Diet As Tolerated -Target Diet: regular diet      Target Diet: regular diet            Activity at Discharge:   Activity Instructions       Activity as Tolerated              Follow-up Appointments  Future Appointments   Date Time Provider Department Center   12/21/2023 10:45 AM Vincenzo Hudson MD [facility]     Additional Instructions for the Follow-ups that You Need to Schedule       Ambulatory Referral to Home Health   As directed      Face to Face Visit Date: 10/11/2023   Follow-up provider for Plan of Care?: I will be treating the patient on an ongoing basis.  Please send me the Plan of Care for signature.   Follow-up provider: SIMBA BYERS [[id number]]   Reason/Clinical Findings: s/p TKA   Describe mobility limitations that make leaving home difficult: Taxing effort   Nursing/Therapeutic Services Requested: Physical Therapy   PT orders: Total joint pathway   Frequency: 1 Week 1        Discharge Follow-up with Specified Provider: Dr. Byers office.  An appointment has already been made at time of surgery scheduling.  Call office if you need to verify appointment time or date.  [phone]; 2 Weeks   As directed      To: Dr. Byers office.  An appointment has already been made at time of surgery scheduling.  Call office if you need to verify appointment time or date.  [phone]   Follow Up: 2 Weeks                Test Results Pending at Discharge       Simba Byers MD  10/11/23,  14:31 EDT     Dialectical  Behavioral Therapy (DBT)/Supported coping skills Dialectical  Behavioral Therapy (DBT) Dialectical  Behavioral Therapy (DBT)/Supported coping skills Dialectical  Behavioral Therapy (DBT)/Supported coping skills/Treatment compliance encouraged Dialectical  Behavioral Therapy (DBT)/Supported coping skills Dialectical  Behavioral Therapy (DBT) Dialectical  Behavioral Therapy (DBT)/Treatment compliance encouraged Dialectical  Behavioral Therapy (DBT) Dialectical  Behavioral Therapy (DBT) Dialectical  Behavioral Therapy (DBT)/other... Dialectical  Behavioral Therapy (DBT)/other... other...

## 2023-10-13 ENCOUNTER — TELEPHONE (OUTPATIENT)
Dept: ORTHOPEDIC SURGERY | Facility: HOSPITAL | Age: 83
End: 2023-10-13
Payer: MEDICARE

## 2023-10-13 NOTE — TELEPHONE ENCOUNTER
Attempted to reach Ms. Bianchi to see how she is doing as she is POD 4 LTK Revision. She was D/C'd to [facility]. Message left at this time.

## 2023-10-14 NOTE — CASE MANAGEMENT/SOCIAL WORK
Case Management Discharge Note      Final Note: dc to snf         Selected Continued Care - Discharged on 10/11/2023 Admission date: 10/9/2023 - Discharge disposition: Rehab Facility or Unit (DC - External)      Destination Coordination complete.      Service Provider Selected Services Address Phone Fax Patient Preferred    [facility] [address] [place] [address] [phone] --              Durable Medical Equipment    No services have been selected for the patient.                Dialysis/Infusion    No services have been selected for the patient.                Home Medical Care    No services have been selected for the patient.                Therapy    No services have been selected for the patient.                Community Resources    No services have been selected for the patient.                Community & DME    No services have been selected for the patient.                         Final Discharge Disposition Code: 03 - skilled nursing facility (SNF)

## 2023-11-06 RX ORDER — AMLODIPINE BESYLATE 2.5 MG/1
TABLET ORAL
Qty: 90 TABLET | Refills: 1 | Status: SHIPPED | OUTPATIENT
Start: 2023-11-06

## 2023-12-13 NOTE — PROGRESS NOTES
RM:________     PCP: Graciela Patten MD    : 1940  AGE: 83 y.o.  EST PATIENT     REASON FOR VISIT/  CC:        BP Readings from Last 3 Encounters:   10/11/23 112/65   10/02/23 (!) 181/81   07/10/23 180/90      Wt Readings from Last 3 Encounters:   10/09/23 74.9 kg (165 lb 2 oz)   10/02/23 74.9 kg (165 lb 3.2 oz)   23 74.6 kg (164 lb 8 oz)        WT: ____________ BP: __________L __________R HR______    CHEST PAIN: _____________    SOA: _____________PALPS: _______________     LIGHTHEADED: ___________FATIGUE: ________________ EDEMA __________    ALLERGIES:Clavulanic acid, Codeine, Cortisone, Fentanyl, Hydrocodone-acetaminophen, Morphine and related, Oxycodone-acetaminophen, Peanut (diagnostic), Peanut oil, Peanut-containing drug products, Prednisone, Statins, Zithromax [azithromycin], and Acid blockers support SMOKING HISTORY:  Social History     Tobacco Use    Smoking status: Never    Smokeless tobacco: Never    Tobacco comments:     caffeine use/ 3 CUPS DAILY    Vaping Use    Vaping Use: Never used   Substance Use Topics    Alcohol use: No    Drug use: No     CAFFEINE USE_________________  ALCOHOL ______________________

## 2023-12-21 ENCOUNTER — OFFICE VISIT (OUTPATIENT)
Dept: CARDIOLOGY | Facility: CLINIC | Age: 83
End: 2023-12-21
Payer: MEDICARE

## 2023-12-21 VITALS
HEART RATE: 51 BPM | DIASTOLIC BLOOD PRESSURE: 70 MMHG | HEIGHT: 63 IN | SYSTOLIC BLOOD PRESSURE: 132 MMHG | WEIGHT: 169 LBS | BODY MASS INDEX: 29.95 KG/M2

## 2023-12-21 DIAGNOSIS — Z86.718 HISTORY OF DVT (DEEP VEIN THROMBOSIS): ICD-10-CM

## 2023-12-21 DIAGNOSIS — M79.89 LEG SWELLING: ICD-10-CM

## 2023-12-21 DIAGNOSIS — I77.819 AORTECTASIA: ICD-10-CM

## 2023-12-21 DIAGNOSIS — I35.9 AORTIC VALVE CALCIFICATION: ICD-10-CM

## 2023-12-21 DIAGNOSIS — I10 PRIMARY HYPERTENSION: ICD-10-CM

## 2023-12-21 DIAGNOSIS — I49.3 PVCS (PREMATURE VENTRICULAR CONTRACTIONS): ICD-10-CM

## 2023-12-21 DIAGNOSIS — R00.1 SINUS BRADYCARDIA: ICD-10-CM

## 2023-12-21 DIAGNOSIS — I25.10 CORONARY ARTERY DISEASE INVOLVING NATIVE CORONARY ARTERY OF NATIVE HEART WITHOUT ANGINA PECTORIS: Primary | ICD-10-CM

## 2023-12-21 DIAGNOSIS — R53.82 CHRONIC FATIGUE: ICD-10-CM

## 2023-12-21 DIAGNOSIS — E87.1 CHRONIC HYPONATREMIA: ICD-10-CM

## 2023-12-21 PROBLEM — Z41.9 SURGERY, ELECTIVE: Status: RESOLVED | Noted: 2023-10-09 | Resolved: 2023-12-21

## 2023-12-21 PROCEDURE — 3075F SYST BP GE 130 - 139MM HG: CPT | Performed by: INTERNAL MEDICINE

## 2023-12-21 PROCEDURE — 1160F RVW MEDS BY RX/DR IN RCRD: CPT | Performed by: INTERNAL MEDICINE

## 2023-12-21 PROCEDURE — 3078F DIAST BP <80 MM HG: CPT | Performed by: INTERNAL MEDICINE

## 2023-12-21 PROCEDURE — 1159F MED LIST DOCD IN RCRD: CPT | Performed by: INTERNAL MEDICINE

## 2023-12-21 PROCEDURE — 99214 OFFICE O/P EST MOD 30 MIN: CPT | Performed by: INTERNAL MEDICINE

## 2023-12-21 PROCEDURE — 93000 ELECTROCARDIOGRAM COMPLETE: CPT | Performed by: INTERNAL MEDICINE

## 2023-12-21 RX ORDER — ASPIRIN 81 MG/1
81 TABLET ORAL DAILY
COMMUNITY

## 2023-12-21 RX ORDER — LOSARTAN POTASSIUM 50 MG/1
50 TABLET ORAL DAILY
Qty: 90 TABLET | Refills: 3 | Status: SHIPPED | OUTPATIENT
Start: 2023-12-21

## 2023-12-21 RX ORDER — AMLODIPINE BESYLATE 2.5 MG/1
2.5 TABLET ORAL DAILY
Qty: 90 TABLET | Refills: 3 | Status: SHIPPED | OUTPATIENT
Start: 2023-12-21

## 2023-12-21 NOTE — PROGRESS NOTES
Date of Office Visit: 2023  Encounter Provider: Vincenzo Hudson MD  Place of Service: [facility] CARDIOLOGY  Patient Name: Diana Bianchi  :1940    Chief Complaint   Patient presents with    Coronary Artery Disease   :     HPI: Diana Bianchi is a 83 y.o. female who presents today to follow up. I have reviewed prior notes and there are no changes except for any new updates described below. I have also reviewed any information entered into the medical record by the patient or by ancillary staff.      She suffered a myocardial infarction in  while living in [place] and was treated with TPA. She moved to [place] in  and had a cardiac catheterization in . She did not have any type of intervention at that time, and has been treated with medical therapy.  She also has a longstanding hypertension.     In , I noted some lateral T wave inversions; I ordered a Myoview stress which was normal.     In , I noted that she had sinus bradycardia.  A Holter showed good heart rate variability with an average rate of 61 bpm. It did show a 20% burden of monomorphic PVCs.  She walked on a modified [place]mer treadmill for nine minutes with good HR response. A follow up Holter in  was unchanged. In May 2021, she was noted to have a systolic murmur; an echo revealed normal LVSF, and aortic valve calcification without stenosis.    She has had chronic hyponatremia, Na 128-132, while on amiloride-HCTZ. Recently, her Na dropped to 125 and the thiazides were stopped. She then reported that her BP went up and she had leg swelling.Her Na ora to 139.  I spoke with Dr Patetn, and we agreed to restart amiloride, with a goal Na of 130-135.  She was then referred to nephrology (Dr Dejesus) and amiloride was discontinued. Amlodipine was added. This then caused leg swelling, so the dose was decreased and furosemide was added.     In 2023, a murmur was noted.  Echocardiogram showed  "aortic valve calcification without stenosis.  It was otherwise unremarkable for age.    She reports ongoing fatigue.  She is not short of breath and she does not have chest pain.  She is not lightheaded or syncopal.  She denies palpitations.  She is just tired all the time.    Past Medical History:   Diagnosis Date    Acute deep vein thrombosis (DVT) 06/02/2022    RIGHT LEG    Aortic calcification 05/26/2021    Moderate per echo    Arrhythmia oct 2023    ASCVD (arteriosclerotic cardiovascular disease)     MI 1991, treated in [place] with tPA.  Cath in 2005 in [place], report unavailable, but no stenting was done.    Bradycardia     Chronic hyponatremia     Claustrophobia     Concussion     HISTORY OF    Congenital heart disease 1991    Coronary artery disease     Coronary artery disease involving native coronary artery of native heart without angina pectoris 08/02/2017    Depression     Dizziness     Endometrial cancer     Esophagitis 01/09/2022    GERD (gastroesophageal reflux disease)     Heart murmur 2023    History of COVID-19 01/2021    Hypertension     Irregular heart beat     Limited mobility     RIGHT FOOT/RIGHT ANKLE    Myocardial infarction Sept 1991    OA (osteoarthritis)     RIGHT FOOT    MICHAELLE (obstructive sleep apnea)     STATES \"BORDERLINE: NO MACHINE USE    Peripheral neuropathy 10/12/2022    PONV (postoperative nausea and vomiting)     N/V     Presence of intraocular lens 03/18/2021    PVCs (premature ventricular contractions) 09/17/2020    Right foot pain     Seasonal allergies        Past Surgical History:   Procedure Laterality Date    ANKLE FUSION Right 06/28/2021    Procedure: RIGHT TRIPLE ARTHRODESIS 1ST TARSOMETATARSAL JOINT ARTHRODESIS ACHILLES LENGTHENING;  Surgeon: Lyle Rm Jr., MD;  [facility]: [facility] [place];  Service: Orthopedics;  Laterality: Right;    CARDIAC CATHETERIZATION  1991 sept    CATARACT EXTRACTION WITH INTRAOCULAR LENS IMPLANT Bilateral     COLONOSCOPY      ENDOSCOPY   "    FOOT SURGERY Left     METAL AND PINS IN LEFT FOOT    HYSTERECTOMY      TOTAL    KNEE ARTHROPLASTY Bilateral     KNEE SURGERY      LUMBAR FUSION      3-4,4-5    TONSILLECTOMY      TOTAL KNEE ARTHROPLASTY REVISION Left 10/9/2023    Procedure: LEFT TOTAL KNEE ARTHROPLASTY REVISION;  Surgeon: Simba Cavazos MD;  Location: [facility] [place] [facility] OR;  Service: Orthopedics;  Laterality: Left;       Social History     Socioeconomic History    Marital status: Single   Tobacco Use    Smoking status: Never    Smokeless tobacco: Never    Tobacco comments:     caffeine use/ 3 CUPS DAILY    Vaping Use    Vaping Use: Never used   Substance and Sexual Activity    Alcohol use: No    Drug use: No    Sexual activity: Not Currently     Partners: Male     Birth control/protection: Hysterectomy     Comment: hysterectomy at age 67       Family History   Problem Relation Age of Onset    Heart disease Mother     Arrhythmia Mother     Heart disease Father     Heart attack Father     Bone cancer Sister     Breast cancer Sister     Heart disease Brother          age 47    Heart failure Brother     Stroke Paternal Grandfather     Hypertension Other     Heart attack Maternal Grandmother     Heart attack Maternal Uncle     Heart attack Maternal Uncle     Hypertension Brother     Clotting disorder Sister         self 1992    Malig Hyperthermia Neg Hx        Review of Systems   Unable to perform ROS  Constitutional: Positive for malaise/fatigue.   Cardiovascular:  Negative for chest pain and palpitations.   Respiratory:  Negative for shortness of breath.    Musculoskeletal:  Positive for myalgias.   Neurological:  Negative for light-headedness.   Psychiatric/Behavioral:  Positive for depression.    All other systems reviewed and are negative.      Allergies   Allergen Reactions    Clavulanic Acid Shortness Of Breath     Ok with amoxicillin  Did not tolerate clavulanate.   Ok with amoxicillin  Did not tolerate clavulanate.       Codeine Anaphylaxis     "Cortisone Other (See Comments) and Shortness Of Breath    Fentanyl Shortness Of Breath    Hydrocodone-Acetaminophen Shortness Of Breath    Morphine And Related Anaphylaxis, Nausea And Vomiting, Other (See Comments) and Shortness Of Breath    Oxycodone-Acetaminophen Shortness Of Breath    Peanut (Diagnostic) Anaphylaxis    Peanut Oil Shortness Of Breath    Peanut-Containing Drug Products Anaphylaxis    Prednisone Shortness Of Breath and Other (See Comments)     \"I COULD NOT BREATHE AND SHOT MY BLOOD PRESSURE UP.\"    Statins Other (See Comments) and Shortness Of Breath          Zithromax [Azithromycin] Anaphylaxis    Acid Blockers Support Other (See Comments)         Current Outpatient Medications:     acetaminophen (TYLENOL) 500 MG tablet, Take 1 tablet by mouth Every 6 (Six) Hours As Needed for Mild Pain., Disp: , Rfl:     amLODIPine (NORVASC) 2.5 MG tablet, Take 1 tablet by mouth Daily., Disp: 90 tablet, Rfl: 3    aspirin 81 MG EC tablet, Take 1 tablet by mouth Daily., Disp: , Rfl:     Calcium-Magnesium-Vitamin D (CALCIUM MAGNESIUM PO), Take 1 tablet by mouth Every Night. HOLD PRIOR TO OR, Disp: , Rfl:     co-enzyme Q-10 30 MG capsule, Take 1 tablet by mouth Daily. HOLD FOR ONE WEEK FOR SURGERY, Disp: , Rfl:     famotidine (Pepcid) 20 MG tablet, Take 1 tablet by mouth 2 (Two) Times a Day., Disp: 180 tablet, Rfl: 2    fexofenadine (ALLEGRA) 180 MG tablet, Take 1 tablet by mouth Daily., Disp: , Rfl:     fluticasone (FLONASE) 50 MCG/ACT nasal spray, 2 sprays into the nostril(s) as directed by provider 2 (two) times a day. A, Disp: 16 g, Rfl: 10    folic acid (FOLVITE) 400 MCG tablet, Take 1 tablet by mouth Daily., Disp: , Rfl:     furosemide (LASIX) 20 MG tablet, Take 0.5 tablets by mouth Every Other Day. Then take one half tablet all other days., Disp: , Rfl:     losartan (COZAAR) 50 MG tablet, Take 1 tablet by mouth Daily., Disp: 90 tablet, Rfl: 3    multivitamin with minerals tablet tablet, Take 1 tablet by mouth " "Daily. HOLD FOR SURGERY, Disp: , Rfl:     naloxone (NARCAN) 4 MG/0.1ML nasal spray, Call 911. Don't prime. [place] in 1 nostril for overdose. Repeat in 2-3 minutes in other nostril if no or minimal breathing/responsiveness., Disp: 2 each, Rfl: 0     Objective:     Vitals:    12/21/23 1100   BP: 132/70   BP Location: Left arm   Pulse: 51   Weight: 76.7 kg (169 lb)   Height: 158.8 cm (62.5\")       Body mass index is 30.42 kg/m².    Physical Exam  Vitals reviewed.   Constitutional:       Appearance: She is well-developed.   HENT:      Head: Normocephalic.      Nose: Nose normal.      Mouth/Throat:      Pharynx: Oropharynx is clear.   Eyes:      Conjunctiva/sclera: Conjunctivae normal.   Neck:      Vascular: No JVD.   Cardiovascular:      Rate and Rhythm: Regular rhythm. Bradycardia present.      Pulses: Intact distal pulses.      Heart sounds: Murmur heard.      Systolic murmur is present with a grade of 2/6.   Pulmonary:      Effort: Pulmonary effort is normal.      Breath sounds: Normal breath sounds.   Abdominal:      Palpations: Abdomen is soft.      Tenderness: There is no abdominal tenderness.   Musculoskeletal:         General: No swelling. Normal range of motion.      Cervical back: Normal range of motion.   Skin:     General: Skin is warm and dry.      Findings: No rash.   Neurological:      General: No focal deficit present.      Mental Status: She is alert and oriented to person, place, and time.      Cranial Nerves: No cranial nerve deficit.   Psychiatric:         Mood and Affect: Mood normal.         ECG 12 Lead    Date/Time: 12/21/2023 2:53 PM  Performed by: Vincenzo Hudson MD    Authorized by: Vincenzo Hudson MD  Comparison: compared with previous ECG   Similar to previous ECG  Rhythm: sinus bradycardia  Ectopy: atrial premature contractions  Conduction: conduction normal  ST Segments: ST segments normal  T Waves: T waves normal  QRS axis: normal  Other findings: left ventricular hypertrophy    Clinical " impression: abnormal EKG          Assessment:       Diagnosis Plan   1. Coronary artery disease involving native coronary artery of native heart without angina pectoris  Stress Test With Myocardial Perfusion One Day      2. Chronic fatigue  Stress Test With Myocardial Perfusion One Day      3. Sinus bradycardia  Holter Monitor - 24 Hour      4. PVCs (premature ventricular contractions)  Holter Monitor - 24 Hour      5. Primary hypertension        6. Chronic hyponatremia        7. Leg swelling        8. History of DVT (deep vein thrombosis)        9. Aortic valve calcification        10. Aortectasia              Plan:     1/2.  Coronary Artery Disease  Assessment   The patient has no angina    Subjective - Objective   There is a history of past MI  1991   Current antiplatelet therapy includes aspirin 81 mg      She is intolerant of statins.  She does not like to take so many medications, either.  A Myoview was normal in 2017.    Due to her severe ongoing fatigue, we will check a perfusion stress test.    3/4/5/6.  Her blood pressure is within goal for her age.  She always has a sinus rate that runs in the low 50s and this is stable.  She does have a known history of PVCs, but her burden was less than 20%, and these appear to have improved as her symptoms have essentially resolved.    She is intolerant of thiazides due to hyponatremia.  She is intolerant of higher doses of amlodipine due to leg swelling.  She is currently stable on low-dose amlodipine, losartan, and furosemide.    7/8.  She has mild pedal edema that is predominantly due to venous insufficiency.  It is stable on furosemide.    9/10.  She has a loud systolic murmur which is due to aortic calcification without stenosis.  This was checked in 2023 and does not need to be checked again until 2025.  She was reported to have aortic ectasia but her root and ascending aorta measured 3.6 and 3.5 cm, respectively, in July 2023, which is normal.    Sincerely,        Vincenzo Hudson MD

## 2023-12-26 ENCOUNTER — TELEPHONE (OUTPATIENT)
Dept: CARDIOLOGY | Facility: CLINIC | Age: 83
End: 2023-12-26
Payer: MEDICARE

## 2023-12-26 NOTE — TELEPHONE ENCOUNTER
LVM with patient telling them the details of their Nuclear test.  Do not eat, drink, smoke (this includes e-cigarettes), chew tobacco or gum 4 hours prior to the test.  No caffeine or chocolate 24 hours prior to your test. This includes coffee, tea, soda, all decaffeinated beverages, cappuccino flavorings or any energy drinks. Also, told them to look at the list of medications [facility] [place] or the list we gave them in office to make sure they do not have to hold anything for 24 hours.

## 2023-12-27 ENCOUNTER — HOSPITAL ENCOUNTER (OUTPATIENT)
Dept: CARDIOLOGY | Facility: HOSPITAL | Age: 83
Discharge: HOME | End: 2023-12-27
Payer: MEDICARE

## 2023-12-27 VITALS — BODY MASS INDEX: 30.08 KG/M2 | HEIGHT: 63 IN | WEIGHT: 169.75 LBS

## 2023-12-27 DIAGNOSIS — R53.82 CHRONIC FATIGUE: ICD-10-CM

## 2023-12-27 DIAGNOSIS — I25.10 CORONARY ARTERY DISEASE INVOLVING NATIVE CORONARY ARTERY OF NATIVE HEART WITHOUT ANGINA PECTORIS: ICD-10-CM

## 2023-12-27 LAB
BH CV NUCLEAR PRIOR STUDY: 1
BH CV REST NUCLEAR ISOTOPE DOSE: 10.7 MCI
BH CV STRESS BP STAGE 1: NORMAL
BH CV STRESS COMMENTS STAGE 1: NORMAL
BH CV STRESS DOSE REGADENOSON STAGE 1: 0.4
BH CV STRESS DURATION MIN STAGE 1: 0
BH CV STRESS DURATION SEC STAGE 1: 10
BH CV STRESS HR STAGE 1: 75
BH CV STRESS NUCLEAR ISOTOPE DOSE: 35.7 MCI
BH CV STRESS PROTOCOL 1: NORMAL
BH CV STRESS RECOVERY BP: NORMAL MMHG
BH CV STRESS RECOVERY HR: 56 BPM
BH CV STRESS STAGE 1: 1
LV EF NUC BP: 56 %
MAXIMAL PREDICTED HEART RATE: 139 BPM
PERCENT MAX PREDICTED HR: 53.96 %
STRESS BASELINE BP: NORMAL MMHG
STRESS BASELINE HR: 57 BPM
STRESS PERCENT HR: 63 %
STRESS POST EXERCISE DUR SEC: 10 SEC
STRESS POST PEAK BP: NORMAL MMHG
STRESS POST PEAK HR: 75 BPM
STRESS TARGET HR: 118 BPM

## 2023-12-27 PROCEDURE — A9502 TC99M TETROFOSMIN: HCPCS | Performed by: INTERNAL MEDICINE

## 2023-12-27 PROCEDURE — 25010000002 REGADENOSON 0.4 MG/5ML SOLUTION: Performed by: INTERNAL MEDICINE

## 2023-12-27 PROCEDURE — 78452 HT MUSCLE IMAGE SPECT MULT: CPT

## 2023-12-27 PROCEDURE — 0 TECHNETIUM TETROFOSMIN KIT: Performed by: INTERNAL MEDICINE

## 2023-12-27 PROCEDURE — 93017 CV STRESS TEST TRACING ONLY: CPT

## 2023-12-27 RX ORDER — REGADENOSON 0.08 MG/ML
0.4 INJECTION, SOLUTION INTRAVENOUS
Status: COMPLETED | OUTPATIENT
Start: 2023-12-27 | End: 2023-12-27

## 2023-12-27 RX ADMIN — TETROFOSMIN 1 DOSE: 1.38 INJECTION, POWDER, LYOPHILIZED, FOR SOLUTION INTRAVENOUS at 13:10

## 2023-12-27 RX ADMIN — REGADENOSON 0.4 MG: 0.08 INJECTION, SOLUTION INTRAVENOUS at 13:11

## 2023-12-27 RX ADMIN — TETROFOSMIN 1 DOSE: 1.38 INJECTION, POWDER, LYOPHILIZED, FOR SOLUTION INTRAVENOUS at 12:23

## 2023-12-27 NOTE — PROGRESS NOTES
Left message for patient to call the office to review normal stress test results.  Triage, please feel free to review these test results with the patient if she returns the call.

## 2023-12-28 ENCOUNTER — TELEPHONE (OUTPATIENT)
Dept: CARDIOLOGY | Facility: CLINIC | Age: 83
End: 2023-12-28
Payer: MEDICARE

## 2023-12-28 NOTE — TELEPHONE ENCOUNTER
Notified Diana Bianchi of results, patient verbalizes understanding.    Yanet Blair RN  [facility] Triage  12/28/23 08:40 EST

## 2023-12-28 NOTE — TELEPHONE ENCOUNTER
Called and left VM, will continue to try to reach pt.    HUB- please put patient straight through to triage    Yaima Duron, RN  Triage RN  12/28/23 08:27 EST

## 2023-12-28 NOTE — TELEPHONE ENCOUNTER
----- Message from YOHANNES Mejia sent at 12/27/2023  4:12 PM EST -----  Left message for patient to call the office to review normal stress test results.  Triage, please feel free to review these test results with the patient if she returns the call.

## 2024-01-02 ENCOUNTER — TELEPHONE (OUTPATIENT)
Dept: CARDIOLOGY | Facility: CLINIC | Age: 84
End: 2024-01-02
Payer: MEDICARE

## 2024-01-02 NOTE — TELEPHONE ENCOUNTER
----- Message from Vincenzo Hudson MD sent at 1/2/2024  3:29 PM EST -----  Please let her know that this is a benign monitor. I don't have a cardiac cause for her overall fatigue. Please arrange 6m f/u with me    Lars woodruff

## 2024-01-02 NOTE — TELEPHONE ENCOUNTER
I spoke with Diana Bianchi and updated pt on results/recommendations from provider.  Pt verbalized understanding and has no further questions at this time.  I scheduled her 6 month FU with Dr. Hudson.    Thank you,    Alondra HAAS, RN  [place] [facility]  01/02/24 15:54 EST

## 2024-01-02 NOTE — TELEPHONE ENCOUNTER
Called and left VM, will continue to try to reach pt.    HUB- please put patient straight through to triage    Yaima Duron, RN  Triage RN  01/02/24 15:33 EST

## 2024-01-04 ENCOUNTER — TELEPHONE (OUTPATIENT)
Dept: CARDIOLOGY | Facility: CLINIC | Age: 84
End: 2024-01-04
Payer: MEDICARE

## 2024-01-04 NOTE — TELEPHONE ENCOUNTER
Patient called because she had a missed call to go over test results.  I think it was an old message, but I went over her normal holter and stress results with her again.      She shares that she accidentally took 2 Allegra allergy pills this morning and was unable to reach her PCP and her pharmacist.  I let her know she will probably be fine since it was just one extra dose.  To be safe, I gave her the number to poison control and she is going to call them now.    She had no further questions at this time and is appreciative.    Yanet Blair RN  [facility] Triage  01/04/24 10:01 EST

## 2024-11-27 NOTE — PROGRESS NOTES
RM:________     PCP: Graciela Patten MD    : 1940  AGE: 84 y.o.  EST PATIENT     REASON FOR VISIT/  CC:        BP Readings from Last 3 Encounters:   23 132/70   10/11/23 112/65   10/02/23 (!) 181/81      Wt Readings from Last 3 Encounters:   23 77 kg (169 lb 12.1 oz)   23 76.7 kg (169 lb)   10/09/23 74.9 kg (165 lb 2 oz)        WT: ____________ BP: __________L __________R HR______    CHEST PAIN: _____________    SOA: _____________PALPS: _______________     LIGHTHEADED: ___________FATIGUE: ________________ EDEMA __________    ALLERGIES:Clavulanic acid, Codeine, Cortisone, Fentanyl, Hydrocodone-acetaminophen, Morphine and codeine, Oxycodone-acetaminophen, Peanut (diagnostic), Peanut oil, Peanut-containing drug products, Prednisone, Statins, Zithromax [azithromycin], and Acid blockers support SMOKING HISTORY:  Social History     Tobacco Use    Smoking status: Never    Smokeless tobacco: Never    Tobacco comments:     caffeine use/ 3 CUPS DAILY    Vaping Use    Vaping status: Never Used   Substance Use Topics    Alcohol use: No    Drug use: No     CAFFEINE USE_________________  ALCOHOL ______________________

## 2024-12-09 ENCOUNTER — OFFICE VISIT (OUTPATIENT)
Dept: CARDIOLOGY | Facility: CLINIC | Age: 84
End: 2024-12-09
Payer: MEDICARE

## 2024-12-09 VITALS
WEIGHT: 169.6 LBS | HEART RATE: 47 BPM | HEIGHT: 63 IN | SYSTOLIC BLOOD PRESSURE: 132 MMHG | DIASTOLIC BLOOD PRESSURE: 82 MMHG | BODY MASS INDEX: 30.05 KG/M2

## 2024-12-09 DIAGNOSIS — I35.9 AORTIC VALVE CALCIFICATION: ICD-10-CM

## 2024-12-09 DIAGNOSIS — R53.82 CHRONIC FATIGUE: ICD-10-CM

## 2024-12-09 DIAGNOSIS — I10 PRIMARY HYPERTENSION: ICD-10-CM

## 2024-12-09 DIAGNOSIS — R00.1 SINUS BRADYCARDIA: ICD-10-CM

## 2024-12-09 DIAGNOSIS — I25.10 CORONARY ARTERY DISEASE INVOLVING NATIVE CORONARY ARTERY OF NATIVE HEART WITHOUT ANGINA PECTORIS: Primary | ICD-10-CM

## 2024-12-09 DIAGNOSIS — I49.3 PVCS (PREMATURE VENTRICULAR CONTRACTIONS): ICD-10-CM

## 2024-12-09 RX ORDER — LOSARTAN POTASSIUM 100 MG/1
100 TABLET ORAL DAILY
COMMUNITY
Start: 2024-03-06 | End: 2025-03-06

## 2024-12-09 RX ORDER — FUROSEMIDE 20 MG/1
10 TABLET ORAL DAILY
Qty: 45 TABLET | Refills: 3 | Status: SHIPPED | OUTPATIENT
Start: 2024-12-09

## 2024-12-09 NOTE — PROGRESS NOTES
Date of Office Visit: 2024  Encounter Provider: Vincenzo Hudson MD  Place of Service: [facility] CARDIOLOGY  Patient Name: Diana Bianchi  :1940    Chief Complaint   Patient presents with    Coronary Artery Disease   :     HPI: Diana Bianchi is a 84 y.o. female who presents today in follow up. I have reviewed prior notes and there are no changes except for any new updates described below. I have also reviewed any information entered into the medical record by the patient or by ancillary staff.      She suffered a myocardial infarction in  while living in [place] and was treated with TPA. She moved to [place] in  and had a cardiac catheterization in . She did not have any type of intervention at that time, and has been treated with medical therapy.  She also has a longstanding hypertension.     In , I noted some lateral T wave inversions; I ordered a Myoview stress which was normal.     In , I noted that she had sinus bradycardia.  A Holter showed good heart rate variability with an average rate of 61 bpm. It did show a 20% burden of monomorphic PVCs.  She walked on a modified Omer treadmill for nine minutes with good HR response. A follow up Holter in  was unchanged. In May 2021, she was noted to have a systolic murmur; an echo revealed normal LVSF, and aortic valve calcification without stenosis.    She has had chronic hyponatremia, Na 128-132, while on amiloride-HCTZ. She was previously on amiloride-HCTZ; ultimately this had to be discontinued. She is on amlodipine but didn't tolerate 5mg due to leg swelling; she's on 2.5mg. Her losartan dose was recently increased from 50mg to 100mg daily for uncontrolled hypertension. She's also on furosemide for chronic hyponatremia.     In 2023, a murmur was noted.  Echocardiogram showed aortic valve calcification without stenosis.  It was otherwise unremarkable for age.    In 2024, a rhythm  "monitor showed NSR, 4% PACs, average HR 61 bpm. A perfusion stress was normal.     She reports ongoing fatigue.  She is not short of breath and she does not have chest pain.  She is not lightheaded or syncopal.  She denies palpitations.  She is just tired all the time.    Past Medical History:   Diagnosis Date    Acute deep vein thrombosis (DVT) 06/02/2022    RIGHT LEG    Aortic calcification 05/26/2021    Moderate per echo    ASCVD (arteriosclerotic cardiovascular disease)     MI 1991, treated in [place] with tPA.  Cath in 2005 in [place], report unavailable, but no stenting was done.    Bradycardia     Chronic hyponatremia     Claustrophobia     Concussion     HISTORY OF    Coronary artery disease involving native coronary artery of native heart without angina pectoris 08/02/2017    Depression     Dizziness     Endometrial cancer     Esophagitis 01/09/2022    GERD (gastroesophageal reflux disease)     History of COVID-19 01/2021    Hypertension     Limited mobility     RIGHT FOOT/RIGHT ANKLE    OA (osteoarthritis)     RIGHT FOOT    MICHAELLE (obstructive sleep apnea)     STATES \"BORDERLINE: NO MACHINE USE    Peripheral neuropathy 10/12/2022    PONV (postoperative nausea and vomiting)     N/V     Presence of intraocular lens 03/18/2021    PVCs (premature ventricular contractions) 09/17/2020    Right foot pain     Seasonal allergies        Past Surgical History:   Procedure Laterality Date    ANKLE FUSION Right 06/28/2021    Procedure: RIGHT TRIPLE ARTHRODESIS 1ST TARSOMETATARSAL JOINT ARTHRODESIS ACHILLES LENGTHENING;  Surgeon: Lyle Rm Jr., MD;  Location: [facility];  Service: Orthopedics;  Laterality: Right;    CARDIAC CATHETERIZATION  1991 sept    CATARACT EXTRACTION WITH INTRAOCULAR LENS IMPLANT Bilateral     COLONOSCOPY      ENDOSCOPY      FOOT SURGERY Left     METAL AND PINS IN LEFT FOOT    HYSTERECTOMY      TOTAL    KNEE ARTHROPLASTY Bilateral     KNEE SURGERY      LUMBAR FUSION      3-4,4-5    " TONSILLECTOMY      TOTAL KNEE ARTHROPLASTY REVISION Left 10/9/2023    Procedure: LEFT TOTAL KNEE ARTHROPLASTY REVISION;  Surgeon: Simba Cavazos MD;  Location: [facility];  Service: Orthopedics;  Laterality: Left;       Social History     Socioeconomic History    Marital status: Single   Tobacco Use    Smoking status: Never     Passive exposure: Never    Smokeless tobacco: Never    Tobacco comments:     caffeine use/ 3 CUPS DAILY    Vaping Use    Vaping status: Never Used   Substance and Sexual Activity    Alcohol use: No    Drug use: No    Sexual activity: Not Currently     Partners: Male     Birth control/protection: Tubal ligation, Hysterectomy     Comment: hysterectomy at age 67       Family History   Problem Relation Age of Onset    Heart disease Mother     Arrhythmia Mother     Heart disease Father     Heart attack Father     Bone cancer Sister     Breast cancer Sister     Heart disease Brother          age 47    Heart failure Brother     Stroke Paternal Grandfather     Hypertension Other     Heart attack Maternal Grandmother     Heart attack Maternal Uncle     Heart attack Maternal Uncle     Hypertension Brother     Clotting disorder Sister         self 1992    Malig Hyperthermia Neg Hx        Review of Systems   Constitutional: Positive for malaise/fatigue.   Cardiovascular:  Negative for chest pain and palpitations.   Respiratory:  Negative for shortness of breath.    Musculoskeletal:  Positive for myalgias.   Neurological:  Negative for light-headedness.   Psychiatric/Behavioral:  Positive for depression.    All other systems reviewed and are negative.      Allergies   Allergen Reactions    Clavulanic Acid Shortness Of Breath     Ok with amoxicillin  Did not tolerate clavulanate.   Ok with amoxicillin  Did not tolerate clavulanate.       Codeine Anaphylaxis    Cortisone Other (See Comments) and Shortness Of Breath    Fentanyl Shortness Of Breath    Hydrocodone-Acetaminophen Shortness Of Breath     "Morphine And Codeine Anaphylaxis, Nausea And Vomiting, Other (See Comments) and Shortness Of Breath    Oxycodone-Acetaminophen Shortness Of Breath    Peanut (Diagnostic) Anaphylaxis    Peanut Oil Shortness Of Breath    Peanut-Containing Drug Products Anaphylaxis    Prednisone Shortness Of Breath and Other (See Comments)     \"I COULD NOT BREATHE AND SHOT MY BLOOD PRESSURE UP.\"    Statins Other (See Comments) and Shortness Of Breath          Zithromax [Azithromycin] Anaphylaxis    Acid Blockers Support Other (See Comments)         Current Outpatient Medications:     acetaminophen (TYLENOL) 500 MG tablet, Take 1 tablet by mouth Every 6 (Six) Hours As Needed for Mild Pain., Disp: , Rfl:     amLODIPine (NORVASC) 2.5 MG tablet, Take 1 tablet by mouth Daily., Disp: 90 tablet, Rfl: 3    aspirin 81 MG EC tablet, Take 1 tablet by mouth Daily., Disp: , Rfl:     Calcium-Magnesium-Vitamin D (CALCIUM MAGNESIUM PO), Take 1 tablet by mouth Every Night. HOLD PRIOR TO OR, Disp: , Rfl:     co-enzyme Q-10 30 MG capsule, Take 1 tablet by mouth Daily. HOLD FOR ONE WEEK FOR SURGERY, Disp: , Rfl:     famotidine (Pepcid) 20 MG tablet, Take 1 tablet by mouth 2 (Two) Times a Day., Disp: 180 tablet, Rfl: 2    fexofenadine (ALLEGRA) 180 MG tablet, Take 1 tablet by mouth Daily., Disp: , Rfl:     fluticasone (FLONASE) 50 MCG/ACT nasal spray, 2 sprays into the nostril(s) as directed by provider 2 (two) times a day. A, Disp: 16 g, Rfl: 10    furosemide (LASIX) 20 MG tablet, Take 0.5 tablets by mouth Daily., Disp: 45 tablet, Rfl: 3    losartan (COZAAR) 100 MG tablet, Take 1 tablet by mouth Daily., Disp: , Rfl:     multivitamin with minerals tablet tablet, Take 1 tablet by mouth Daily. HOLD FOR SURGERY, Disp: , Rfl:     NON FORMULARY, Apply 1-2 g topically to the appropriate area as directed Every 6 (Six) Hours As Needed., Disp: , Rfl:     folic acid (FOLVITE) 400 MCG tablet, Take 1 tablet by mouth Daily. (Patient not taking: Reported on 12/9/2024), " "Disp: , Rfl:     naloxone (NARCAN) 4 MG/0.1ML nasal spray, Call 911. Don't prime. [place] in 1 nostril for overdose. Repeat in 2-3 minutes in other nostril if no or minimal breathing/responsiveness., Disp: 2 each, Rfl: 0     Objective:     Vitals:    12/09/24 0939 12/09/24 1000   BP: 164/72 132/82   BP Location: Left arm    Patient Position: Sitting    Cuff Size: Adult    Pulse: (!) 47    Weight: 76.9 kg (169 lb 9.6 oz)    Height: 158.8 cm (62.52\")        Body mass index is 30.51 kg/m².    Physical Exam  Vitals reviewed.   Constitutional:       Appearance: She is well-developed.   HENT:      Head: Normocephalic.      Nose: Nose normal.      Mouth/Throat:      Pharynx: Oropharynx is clear.   Eyes:      Conjunctiva/sclera: Conjunctivae normal.   Neck:      Vascular: No JVD.   Cardiovascular:      Rate and Rhythm: Regular rhythm. Bradycardia present.      Pulses: Intact distal pulses.      Heart sounds: Murmur heard.      Systolic murmur is present with a grade of 2/6.   Pulmonary:      Effort: Pulmonary effort is normal.      Breath sounds: Normal breath sounds.   Abdominal:      Palpations: Abdomen is soft.      Tenderness: There is no abdominal tenderness.   Musculoskeletal:         General: No swelling. Normal range of motion.      Cervical back: Normal range of motion.   Skin:     General: Skin is warm and dry.   Neurological:      General: No focal deficit present.      Mental Status: She is alert.   Psychiatric:         Mood and Affect: Mood normal.         ECG 12 Lead    Date/Time: 12/9/2024 5:22 PM  Performed by: Vincenzo Hudson MD    Authorized by: Vincenzo Hudson MD  Comparison: compared with previous ECG   Similar to previous ECG  Rhythm: sinus rhythm  Conduction: conduction normal  QRS axis: normal  Other findings: non-specific ST-T wave changes and left ventricular hypertrophy    Clinical impression: abnormal EKG          Assessment:       Diagnosis Plan   1. Coronary artery disease involving native coronary " artery of native heart without angina pectoris        2. Primary hypertension        3. PVCs (premature ventricular contractions)        4. Sinus bradycardia        5. Aortic valve calcification        6. Chronic fatigue          Plan:     1.  Coronary Artery Disease  Assessment   The patient has no angina    Subjective - Objective   There is a history of past MI  1991   Current antiplatelet therapy includes aspirin 81 mg      She is intolerant of statins.  She does not like to take so many medications, either.  A Myoview was normal in 2024.    2/3/4.  Her blood pressure was high when she first came in, and it was WNL when I rechecked it.  It looks like the increased dose of losartan really helped.  She has chronic hyponatremia and cannot take thiazides.  She is doing well with furosemide.  Her heart rate is always low, usually in the 50s.  Today is in the high 40s but her intervals are normal and she denies evidence of advanced conduction disease.  She was previously noted to have PVCs but her most recent monitor showed a 4% burden of PACs.  These are asymptomatic.    5.  In 2023, she had moderate aortic valve calcification without stenosis.  We will recheck this in 2025.    6. She has chronic fatigue that I do not believe is cardiac in etiology.    I noted that she has an appointment with another cardiologist in a few weeks.  She states that her primary care physician referred her there.  If she would like to keep this appointment for second opinion, that is acceptable, but I did let her know that she will ultimately need to pick one cardiologist for her long-term follow-up.  Sincerely,       Vincenzo Hudson MD

## 2025-02-13 RX ORDER — AMLODIPINE BESYLATE 2.5 MG/1
2.5 TABLET ORAL DAILY
Qty: 90 TABLET | Refills: 3 | Status: SHIPPED | OUTPATIENT
Start: 2025-02-13

## 2025-02-13 NOTE — TELEPHONE ENCOUNTER
COMPREHENSIVE METABOLIC PANEL [LAB17] (Order [id number])  Order  Date: 11/25/2024 Department: Generic External Data Department Documenting/Authorizing: Provider, Generic External Data     Reprint Order Requisition    COMPREHENSIVE METABOLIC PANEL (Order #[id number]) on 11/25/24            suggestion  Result Information displayed in this report will not trend and may not trigger automated decision support.      Contains abnormal data COMPREHENSIVE METABOLIC PANEL  Order: [id number]  Component  Ref Range & Units 2 mo ago   Sodium  136 - 145 mmol/L 143   Comment: Excess protein and/or lipids can falsely decrease sodium levels (pseudo hyponatremia).   Potassium  3.5 - 5.1 mmol/L 4.1   Comment: Falsely elevated potassium can occur in patients with high WBC or platelet counts.   Chloride  98 - 107 mmol/L 103   Carbon Dioxide  22 - 29 mmol/L 28   Anion Gap  5 - 13 mmol/L 12   Comment: Calculation: Na - (Cl + CO2)   Glucose, Random  71 - 99 mg/dL 96   Blood Urea Nitrogen (BUN)  10 - 20 mg/dL 25 High    Creatinine, Blood  0.55 - 1.02 mg/dL 0.9   BUN/Creatinine Ratio  RATIO 27.8   Estimated GFR (Cr)  >60 mL/min/1.73m2 64   Comment: eGFR calculated based on IDMS traceable, enzymatic creatinine method using the CKD-EPI 2021 equation.   Total Protein  6.2 - 8.0 g/dL 7.1   Albumin  3.2 - 4.6 g/dL 4.3   Globulin  1.5 - 4.5 g/dL 2.8   Albumin/Globulin Ratio  1.1 - 2.5 RATIO 1.5   Calcium  8.4 - 10.2 mg/dL 10.5 High    Total Bilirubin  0.2 - 1.2 mg/dL 0.6   AST/SGOT  10 - 35 U/L 26   ALT/SGPT  10 - 35 U/L 16   Alkaline Phosphatase  40 - 150 U/L 100   Resulting Agency [facility]     Specimen Collected: 11/25/24 11:04    Performed by: [facility] Last Resulted: 11/25/24 15:22   Received From: [facility]  Result Received: 12/09/24 09:24    View Encounter        Received Information  Lab Component SmartPhrase Guide    COMPREHENSIVE METABOLIC PANEL (Order #[id number]) on 11/25/24

## 2025-04-08 ENCOUNTER — TELEPHONE (OUTPATIENT)
Dept: CARDIOLOGY | Age: 85
End: 2025-04-08
Payer: MEDICARE

## 2025-04-08 NOTE — TELEPHONE ENCOUNTER
I called and spoke with Ms. Bianchi regarding her message she sent to Dr. Hudson on 4/7/2025 seeking advice post cath, which was performed at another institution.     I advised Ms. Bianchi that Dr. Hudson will not be able to give medical advice on this, as Ms. Bianchi has transferred care to another cardiologist/institution. Patient stated I did not need to go on any further as she feels she has made the right choice to transfer care.     Patient has been dismissed from care of [facility] and Dr. Hudson. Formal letter will be sent to patient as well.     Thank you,   Jannie

## (undated) DEVICE — ANTIBACTERIAL UNDYED BRAIDED (POLYGLACTIN 910), SYNTHETIC ABSORBABLE SUTURE: Brand: COATED VICRYL

## (undated) DEVICE — BNDG ELAS ELITE V/CLOSE 6IN 5YD LF STRL

## (undated) DEVICE — DRILL BIT, CANNULATED, 3.3MM: Brand: MEDLINE

## (undated) DEVICE — GOWN,REINF,POLY,SIRUS,BREATH SLV,XLNG/XL: Brand: MEDLINE

## (undated) DEVICE — APPL CHLORAPREP HI/LITE 26ML ORNG

## (undated) DEVICE — DRILL PIN, JOINT FENESTRATION, 2.0MM: Brand: MEDLINE UNITE

## (undated) DEVICE — SOL ISO/ALC 70PCT 4OZ

## (undated) DEVICE — INTENDED FOR TISSUE SEPARATION, AND OTHER PROCEDURES THAT REQUIRE A SHARP SURGICAL BLADE TO PUNCTURE OR CUT.: Brand: BARD-PARKER ® CARBON RIB-BACK BLADES

## (undated) DEVICE — GLV SURG BIOGEL LTX PF 8

## (undated) DEVICE — NEEDLE, QUINCKE, 18GX3.5": Brand: MEDLINE

## (undated) DEVICE — PROXIMATE RH ROTATING HEAD SKIN STAPLERS (35 WIDE) CONTAINS 35 STAINLESS STEEL STAPLES: Brand: PROXIMATE

## (undated) DEVICE — SUT VIC 3/0 CT2 27IN J232H

## (undated) DEVICE — DUAL CUT SAGITTAL BLADE

## (undated) DEVICE — COVER,TABLE,44X90,STERILE: Brand: MEDLINE

## (undated) DEVICE — STCKNT IMPERV 12IN STRL

## (undated) DEVICE — PATIENT RETURN ELECTRODE, SINGLE-USE, CONTACT QUALITY MONITORING, ADULT, WITH 9FT CORD, FOR PATIENTS WEIGING OVER 33LBS. (15KG): Brand: MEGADYNE

## (undated) DEVICE — DRAPE,U/ SHT,SPLIT,PLAS,STERIL: Brand: MEDLINE

## (undated) DEVICE — PK KN TOTL 40

## (undated) DEVICE — SUT VIC 2/0 CT2 27IN J269H

## (undated) DEVICE — PAD,ABDOMINAL,8"X10",ST,LF: Brand: MEDLINE

## (undated) DEVICE — DRILL BIT, CANNULATED, LONG, 4.5MM: Brand: MEDLINE

## (undated) DEVICE — DRSNG GZ CURAD XEROFORM NONADHS 5X9IN STRL

## (undated) DEVICE — SPNG GZ WOVN 4X4IN 12PLY 10/BX STRL

## (undated) DEVICE — OPTIFOAM GENTLE SA, POSTOP, 4X12: Brand: MEDLINE

## (undated) DEVICE — KT INST PROC DYNACLIP UNIV DISP STRL

## (undated) DEVICE — SUT ETHLN 3/0 PS1 18IN 1663H

## (undated) DEVICE — SPNG LAP 18X18IN LF STRL PK/5

## (undated) DEVICE — DRP C/ARMOR

## (undated) DEVICE — GLV SURG BIOGEL LTX PF 8 1/2

## (undated) DEVICE — SOL NACL 0.9PCT 100ML SGL

## (undated) DEVICE — GUIDEPIN, NON-THREADED, 2.5 X 200MM: Brand: MEDLINEUNITE

## (undated) DEVICE — SCRW HEX PERSONA FML 2.5X25MM PK/2
Type: IMPLANTABLE DEVICE | Site: KNEE | Status: NON-FUNCTIONAL
Removed: 2023-10-09

## (undated) DEVICE — UNDERCAST PADDING: Brand: DEROYAL

## (undated) DEVICE — TBG PENCL TELESCP MEGADYNE SMOKE EVAC 10FT

## (undated) DEVICE — SPLNT PLSTR ORTHO 5IN

## (undated) DEVICE — BNDG ELAS ELITE V/CLOSE 4IN 5YD LF STRL

## (undated) DEVICE — MAT FLR ABSORBENT LG 4FT 10 2.5FT

## (undated) DEVICE — ARTHROTOME, SMALL JOINT, AO/QC: Brand: MEDLINE UNITE

## (undated) DEVICE — RECIPROCATING BLADE, DOUBLE SIDED, OFFSET  (70.0 X 0.64 X 12.6MM)

## (undated) DEVICE — POOLE SUCTION HANDLE: Brand: CARDINAL HEALTH

## (undated) DEVICE — GLV SURG SIGNATURE ESSENTIAL PF LTX SZ8

## (undated) DEVICE — TRAP FLD MINIVAC MEGADYNE 100ML

## (undated) DEVICE — DRP C/ARM 41X74IN

## (undated) DEVICE — PK PROC DYNACLIP/FORTE NO/IMP DISP STRL

## (undated) DEVICE — PREP SOL POVIDONE/IODINE BT 4OZ

## (undated) DEVICE — PK ORTHO MINOR TOWER 40

## (undated) DEVICE — SYRINGE, LUER LOCK, 60ML: Brand: MEDLINE

## (undated) DEVICE — PREMIUM DRY TRAY LF: Brand: MEDLINE INDUSTRIES, INC.

## (undated) DEVICE — GLV SURG PREMIERPRO ORTHO LTX PF SZ8 BRN

## (undated) DEVICE — STRAP STIRUP WO/ RNG

## (undated) DEVICE — SOL ISO/ALC RUB 70PCT 4OZ

## (undated) DEVICE — DISPOSABLE TOURNIQUET CUFF SINGLE BLADDER, SINGLE PORT AND QUICK CONNECT CONNECTOR: Brand: COLOR CUFF

## (undated) DEVICE — PENCL E/S ULTRAVAC TELESCP NOSE HOLSTR 10FT

## (undated) DEVICE — PIN DRL NOHEAD TROC 3.2X75MM